# Patient Record
Sex: FEMALE | Race: OTHER | URBAN - METROPOLITAN AREA
[De-identification: names, ages, dates, MRNs, and addresses within clinical notes are randomized per-mention and may not be internally consistent; named-entity substitution may affect disease eponyms.]

---

## 2021-02-25 ENCOUNTER — OFFICE VISIT (OUTPATIENT)
Dept: FAMILY MEDICINE CLINIC | Age: 51
End: 2021-02-25
Payer: COMMERCIAL

## 2021-02-25 VITALS
DIASTOLIC BLOOD PRESSURE: 78 MMHG | BODY MASS INDEX: 25.19 KG/M2 | SYSTOLIC BLOOD PRESSURE: 122 MMHG | OXYGEN SATURATION: 98 % | HEART RATE: 73 BPM | TEMPERATURE: 97.7 F | HEIGHT: 58 IN | WEIGHT: 120 LBS

## 2021-02-25 DIAGNOSIS — Z12.31 ENCOUNTER FOR SCREENING MAMMOGRAM FOR MALIGNANT NEOPLASM OF BREAST: ICD-10-CM

## 2021-02-25 DIAGNOSIS — Z23 NEED FOR VACCINATION: ICD-10-CM

## 2021-02-25 DIAGNOSIS — I10 ESSENTIAL HYPERTENSION: ICD-10-CM

## 2021-02-25 DIAGNOSIS — Z12.11 SCREENING FOR COLON CANCER: ICD-10-CM

## 2021-02-25 DIAGNOSIS — Z00.00 ENCOUNTER FOR PREVENTIVE CARE: Primary | ICD-10-CM

## 2021-02-25 PROCEDURE — 99386 PREV VISIT NEW AGE 40-64: CPT | Performed by: NURSE PRACTITIONER

## 2021-02-25 RX ORDER — LISINOPRIL 10 MG/1
10 TABLET ORAL DAILY
Qty: 90 TABLET | Refills: 1 | Status: SHIPPED | OUTPATIENT
Start: 2021-02-25 | End: 2021-02-25

## 2021-02-25 RX ORDER — ZOSTER VACCINE RECOMBINANT, ADJUVANTED 50 MCG/0.5
0.5 KIT INTRAMUSCULAR SEE ADMIN INSTRUCTIONS
Qty: 0.5 ML | Refills: 0 | Status: SHIPPED | OUTPATIENT
Start: 2021-02-25 | End: 2021-08-24

## 2021-02-25 RX ORDER — LISINOPRIL 10 MG/1
10 TABLET ORAL DAILY
Qty: 30 TABLET | Refills: 0 | Status: SHIPPED | OUTPATIENT
Start: 2021-02-25 | End: 2021-09-20

## 2021-02-25 RX ORDER — CHLORAL HYDRATE 500 MG
3000 CAPSULE ORAL 3 TIMES DAILY
COMMUNITY

## 2021-02-25 RX ORDER — LISINOPRIL 10 MG/1
TABLET ORAL
COMMUNITY
Start: 2021-01-06 | End: 2021-02-25 | Stop reason: SDUPTHER

## 2021-02-25 ASSESSMENT — PATIENT HEALTH QUESTIONNAIRE - PHQ9
SUM OF ALL RESPONSES TO PHQ9 QUESTIONS 1 & 2: 0
SUM OF ALL RESPONSES TO PHQ QUESTIONS 1-9: 0
2. FEELING DOWN, DEPRESSED OR HOPELESS: 0

## 2021-02-25 NOTE — PROGRESS NOTES
Moraima Houston  : 1970  Encounter date: 2021    This trey 48 y.o. female who presents with  Chief Complaint   Patient presents with    New Patient     New pt appt        History of present illness:    HPI   History and Physical      Moraima Houston  YOB: 1970    Date of Service:  2021    Chief Complaint:   Moraima Houston is a 48 y.o. female who presents for complete physical examination    HPI: Pt is 48year old female for annual exam.  Due for labs. Existing hypertension, well controlled with lisinopril. No new concerns. Wt Readings from Last 3 Encounters:   21 120 lb (54.4 kg)     BP Readings from Last 3 Encounters:   21 122/78       Patient Active Problem List   Diagnosis    Essential hypertension       Allergies   Allergen Reactions    Crab Extract Allergy Skin Test     Seasonal     Shellfish Allergy     Shrimp Extract Allergy Skin Test      Outpatient Medications Marked as Taking for the 21 encounter (Office Visit) with DEMIAN Hinkle NP   Medication Sig Dispense Refill    Cholecalciferol 50 MCG (2000 UT) CAPS Take 2,000 Units by mouth daily      Omega-3 Fatty Acids (FISH OIL) 1000 MG CAPS Take 3,000 mg by mouth 3 times daily      zoster recombinant adjuvanted vaccine (SHINGRIX) 50 MCG/0.5ML SUSR injection Inject 0.5 mLs into the muscle See Admin Instructions 1 dose now and repeat in 2-6 months 0.5 mL 0    lisinopril (PRINIVIL;ZESTRIL) 10 MG tablet Take 1 tablet by mouth daily 30 tablet 0       History reviewed. No pertinent past medical history. History reviewed. No pertinent surgical history. History reviewed. No pertinent family history.   Social History     Socioeconomic History    Marital status:      Spouse name: Not on file    Number of children: Not on file    Years of education: Not on file    Highest education level: Not on file   Occupational History    Not on file   Social Needs  Financial resource strain: Not on file   Kirill-Jonathan insecurity     Worry: Not on file     Inability: Not on file   [employer] needs     Medical: Not on file     Non-medical: Not on file   Tobacco Use    Smoking status: Never Smoker    Smokeless tobacco: Never Used   Substance and Sexual Activity    Alcohol use: Yes     Comment: ocasionally    Drug use: Never    Sexual activity: Not on file   Lifestyle    Physical activity     Days per week: Not on file     Minutes per session: Not on file    Stress: Not on file   Relationships    Social connections     Talks on phone: Not on file     Gets together: Not on file     Attends Anabaptist service: Not on file     Active member of club or organization: Not on file     Attends meetings of clubs or organizations: Not on file     Relationship status: Not on file    Intimate partner violence     Fear of current or ex partner: Not on file     Emotionally abused: Not on file     Physically abused: Not on file     Forced sexual activity: Not on file   Other Topics Concern    Not on file   Social History Narrative    Not on file       Hx abnormal PAP: no  Sexual activity: single partner, contraception - none   Self-breast exams: yes  Last eye exam: 2020  Exercise: no regular exercise    Review of Systems:  A comprehensive review of systems was negative. Physical Exam:   Vitals:    02/25/21 1432   BP: 122/78   Site: Left Upper Arm   Position: Sitting   Cuff Size: Medium Adult   Pulse: 73   Temp: 97.7 °F (36.5 °C)   TempSrc: Temporal   SpO2: 98%   Weight: 120 lb (54.4 kg)   Height: 4' 10\" (1.473 m)     Body mass index is 25.08 kg/m². Constitutional: She is oriented to person, place, and time. She appears well-developed and well-nourished. No distress. HEENT:   Head: Normocephalic and atraumatic.    Right Ear: Tympanic membrane, external ear and ear canal normal.   Left Ear: Tympanic membrane, external ear and ear canal normal. Mouth/Throat: Oropharynx is clear and moist, and mucous membranes are normal.  There is no cervical adenopathy. Eyes: Conjunctivae and extraocular motions are normal. Pupils are equal, round, and reactive to light. Neck: Supple. No JVD present. Carotid bruit is not present. No mass and no thyromegaly present. Cardiovascular: Normal rate, regular rhythm, normal heart sounds and intact distal pulses. Exam reveals no gallop and no friction rub. No murmur heard. Pulmonary/Chest: Effort normal and breath sounds normal. No respiratory distress. She has no wheezes, rhonchi or rales. Abdominal: Soft, non-tender. Bowel sounds and aorta are normal. She exhibits no organomegaly, mass or bruit. Genitourinary: examination not indicated. Breast exam:  not examined. Musculoskeletal: Normal range of motion, no synovitis. She exhibits no edema. Neurological: She is alert and oriented to person, place, and time. She has normal reflexes. No cranial nerve deficit. Coordination normal.   Skin: Skin is warm and dry. There is no rash or erythema. No suspicious lesions noted. Psychiatric: She has a normal mood and affect.  Her speech is normal and behavior is normal. Judgment, cognition and memory are normal.     Preventive Care:  Health Maintenance   Topic Date Due    Potassium monitoring  1970    Creatinine monitoring  1970    Hepatitis C screen  1970    HIV screen  11/16/1985    Cervical cancer screen  11/16/1991    Lipid screen  11/16/2010    Diabetes screen  11/16/2010    Flu vaccine (1) 09/01/2020    Breast cancer screen  11/16/2020    Shingles Vaccine (1 of 2) 11/16/2020    Colon cancer screen colonoscopy  11/16/2020    DTaP/Tdap/Td vaccine (2 - Td) 12/21/2020    Hepatitis A vaccine  Aged Out    Hepatitis B vaccine  Aged Out    Hib vaccine  Aged Out    Meningococcal (ACWY) vaccine  Aged Out    Pneumococcal 0-64 years Vaccine  Aged [place] Preventive plan of care for Natasha Maloney        2/25/2021           Preventive Measures Status       Recommendations for screening   Colon Cancer Screen   Last colonoscopy:NA Test recommended and ordered   Breast Cancer Screen  Last mammogram: 2018 Test recommended and ordered   Cervical Cancer Screen   Last PAP smear: 2018 Repeat every 3 years   Osteoporosis Screen   Last DXA scan: NA Recommended, but declined   Diabetes Screen  No results found for: GLUCOSE Test recommended and ordered   Cholesterol Screen  No results found for: CHOL, TRIG, HDL, LDLCALC, LDLDIRECT Test recommended and ordered   Aspirin for Cardiovascular Prevention   No Not indicated   Weight: Body mass index is 25.08 kg/m². 4' 10\" (1.473 m)120 lb (54.4 kg)    Your BMI is between 18.5 and 24.9, which indicates that you are at a healthy weight   Living Will: No   recommended    Recommended Immunizations      There is no immunization history on file for this patient. Influenza vaccine:  recommended every fall         Other Recommendations ·   Follow up in this office every 6 months for re-evaluation of your chronic medical issues                 Assessment/Plan:    Christina Willis was seen today for new patient. Diagnoses and all orders for this visit:    Encounter for preventive care  -     CBC Auto Differential; Future  -     Comprehensive Metabolic Panel, Fasting; Future  -     Lipid, Fasting; Future  -     HIV Screen; Future  -     HEPATITIS C ANTIBODY; Future    Essential hypertension  -     Discontinue: lisinopril (PRINIVIL;ZESTRIL) 10 MG tablet; Take 1 tablet by mouth daily  -     Microalbumin / Creatinine Urine Ratio; Future  -     lisinopril (PRINIVIL;ZESTRIL) 10 MG tablet; Take 1 tablet by mouth daily    Encounter for screening mammogram for malignant neoplasm of breast  -     AWA DIGITAL SCREEN W OR WO CAD BILATERAL;  Future    Need for vaccination -     zoster recombinant adjuvanted vaccine [facility]) 50 MCG/0.5ML SUSR injection; Inject 0.5 mLs into the muscle See Admin Instructions 1 dose now and repeat in 2-6 months    Screening for colon cancer  -     Iraida (For External Results Only); Future                Current Outpatient Medications on File Prior to Visit   Medication Sig Dispense Refill    Cholecalciferol 50 MCG (2000 UT) CAPS Take 2,000 Units by mouth daily      Omega-3 Fatty Acids (FISH OIL) 1000 MG CAPS Take 3,000 mg by mouth 3 times daily       No current facility-administered medications on file prior to visit. Allergies   Allergen Reactions    Crab Extract Allergy Skin Test     Seasonal     Shellfish Allergy     Shrimp Extract Allergy Skin Test      History reviewed. No pertinent past medical history. History reviewed. No pertinent surgical history. History reviewed. No pertinent family history. Social History     Tobacco Use    Smoking status: Never Smoker    Smokeless tobacco: Never Used   Substance Use Topics    Alcohol use: Yes     Comment: ocasionally        Review of Systems    Objective:    /78 (Site: Left Upper Arm, Position: Sitting, Cuff Size: Medium Adult)   Pulse 73   Temp 97.7 °F (36.5 °C) (Temporal)   Ht 4' 10\" (1.473 m)   Wt 120 lb (54.4 kg)   SpO2 98%   BMI 25.08 kg/m²   Weight: 120 lb (54.4 kg)     BP Readings from Last 3 Encounters:   02/25/21 122/78     Wt Readings from Last 3 Encounters:   02/25/21 120 lb (54.4 kg)     BMI Readings from Last 3 Encounters:   02/25/21 25.08 kg/m²       Physical Exam    Assessment/Plan    1. Encounter for preventive care  Advised living will  Advised routine dental and vision  Advised increased exercise  - CBC Auto Differential; Future  - Comprehensive Metabolic Panel, Fasting; Future  - Lipid, Fasting; Future  - HIV Screen; Future  - HEPATITIS C ANTIBODY; Future    2.  Essential hypertension  Advised routine monitoring - Microalbumin / Creatinine Urine Ratio; Future  - lisinopril (PRINIVIL;ZESTRIL) 10 MG tablet; Take 1 tablet by mouth daily  Dispense: 30 tablet; Refill: 0    3. Encounter for screening mammogram for malignant neoplasm of breast  - AWA DIGITAL SCREEN W OR WO CAD BILATERAL; Future    4. Need for vaccination  Advised COVID, influenza vaccination  - zoster recombinant adjuvanted vaccine [facility]) 50 MCG/0.5ML SUSR injection; Inject 0.5 mLs into the muscle See Admin Instructions 1 dose now and repeat in 2-6 months  Dispense: 0.5 mL; Refill: 0    5. Screening for colon cancer  - Cologuard (For External Results Only); Future      Return in about 6 months (around 8/25/2021) for hypertension re-check. This dictation was generated by voice recognition computer software. Although all attempts are made to edit the dictation for accuracy, there may be errors in the transcription that are not intended.

## 2021-03-24 ENCOUNTER — TELEPHONE (OUTPATIENT)
Dept: FAMILY MEDICINE CLINIC | Age: 51
End: 2021-03-24

## 2021-04-05 ENCOUNTER — TELEPHONE (OUTPATIENT)
Dept: FAMILY MEDICINE CLINIC | Age: 51
End: 2021-04-05

## 2021-09-20 DIAGNOSIS — I10 ESSENTIAL HYPERTENSION: ICD-10-CM

## 2021-09-20 RX ORDER — LISINOPRIL 10 MG/1
TABLET ORAL
Qty: 30 TABLET | Refills: 0 | Status: SHIPPED | OUTPATIENT
Start: 2021-09-20 | End: 2021-11-02

## 2021-10-27 ENCOUNTER — TELEPHONE (OUTPATIENT)
Dept: FAMILY MEDICINE CLINIC | Age: 51
End: 2021-10-27

## 2021-10-27 NOTE — TELEPHONE ENCOUNTER
----- Message from Rahul Oropeza sent at 10/27/2021  1:55 PM EDT -----  Subject: Refill Request    QUESTIONS  Name of Medication? lisinopril (PRINIVIL;ZESTRIL) 10 MG tablet  Patient-reported dosage and instructions? 10 MG tablet - One per day  How many days do you have left? 0  Preferred Pharmacy? [address] [place] [address] [email or web address]  Pharmacy phone number (if available)? [phone]  ---------------------------------------------------------------------------  --------------  Namrata MEDINA  What is the best way for the office to contact you? OK to leave message on   voicemail  Preferred Call Back Phone Number?  [phone]

## 2021-10-31 DIAGNOSIS — I10 ESSENTIAL HYPERTENSION: ICD-10-CM

## 2021-11-01 DIAGNOSIS — I10 ESSENTIAL HYPERTENSION: ICD-10-CM

## 2021-11-01 RX ORDER — LISINOPRIL 10 MG/1
TABLET ORAL
Qty: 30 TABLET | Refills: 0 | OUTPATIENT
Start: 2021-11-01

## 2021-11-01 NOTE — TELEPHONE ENCOUNTER
Medication:   Requested Prescriptions     Pending Prescriptions Disp Refills    lisinopril (PRINIVIL;ZESTRIL) 10 MG tablet 30 tablet 0     Sig: TAKE ONE TABLET BY MOUTH DAILY *Patient due for an appt*        Last Filled: 9/20/2021      Patient Phone Number: [phone] (home)     Last appt: 2/25/2021   Next appt: Visit date not found    Last OARRS: No flowsheet data found.

## 2021-11-01 NOTE — TELEPHONE ENCOUNTER
lisinopril (PRINIVIL;ZESTRIL) 10 MG tablet [[id number]]    [facility], [address] [place] [address] [address]   [address] [place] [address], [address] [place] [address],[address] [id number]   Phone:  [phone]  Fax:  [phone]

## 2021-11-02 RX ORDER — LISINOPRIL 10 MG/1
10 TABLET ORAL DAILY
Qty: 30 TABLET | Refills: 0 | Status: SHIPPED | OUTPATIENT
Start: 2021-11-02 | End: 2021-11-08 | Stop reason: SDUPTHER

## 2021-11-02 NOTE — TELEPHONE ENCOUNTER
Medication:   Requested Prescriptions     Pending Prescriptions Disp Refills    lisinopril (PRINIVIL;ZESTRIL) 10 MG tablet [Pharmacy Med Name: LISINOPRIL 10 MG TABLET] 30 tablet 0     Sig: Take 1 tablet by mouth daily *Patient needs an appointment for further refill*        Last Filled: 9/20/2021     Patient Phone Number: [phone] (home)     Last appt: 2/25/2021   Next appt:     Last OARRS: No flowsheet data found.

## 2021-11-08 ENCOUNTER — OFFICE VISIT (OUTPATIENT)
Dept: FAMILY MEDICINE CLINIC | Age: 51
End: 2021-11-08
Payer: COMMERCIAL

## 2021-11-08 VITALS
HEART RATE: 86 BPM | DIASTOLIC BLOOD PRESSURE: 72 MMHG | SYSTOLIC BLOOD PRESSURE: 112 MMHG | BODY MASS INDEX: 26.54 KG/M2 | WEIGHT: 127 LBS | OXYGEN SATURATION: 97 % | TEMPERATURE: 98.4 F

## 2021-11-08 DIAGNOSIS — I10 ESSENTIAL HYPERTENSION: Primary | ICD-10-CM

## 2021-11-08 DIAGNOSIS — Z00.00 ENCOUNTER FOR PREVENTIVE CARE: ICD-10-CM

## 2021-11-08 DIAGNOSIS — I10 ESSENTIAL HYPERTENSION: ICD-10-CM

## 2021-11-08 PROCEDURE — 99213 OFFICE O/P EST LOW 20 MIN: CPT | Performed by: NURSE PRACTITIONER

## 2021-11-08 RX ORDER — LISINOPRIL 10 MG/1
10 TABLET ORAL DAILY
Qty: 90 TABLET | Refills: 1 | Status: SHIPPED | OUTPATIENT
Start: 2021-11-08

## 2023-03-20 LAB — MAMMOGRAPHY, EXTERNAL: NORMAL

## 2024-05-29 ENCOUNTER — OFFICE VISIT (OUTPATIENT)
Dept: PRIMARY CARE CLINIC | Age: 54
End: 2024-05-29
Payer: COMMERCIAL

## 2024-05-29 VITALS
HEIGHT: 58 IN | WEIGHT: 102.4 LBS | DIASTOLIC BLOOD PRESSURE: 72 MMHG | TEMPERATURE: 98.2 F | SYSTOLIC BLOOD PRESSURE: 118 MMHG | BODY MASS INDEX: 21.5 KG/M2 | HEART RATE: 65 BPM | OXYGEN SATURATION: 97 %

## 2024-05-29 DIAGNOSIS — N30.00 ACUTE CYSTITIS WITHOUT HEMATURIA: Primary | ICD-10-CM

## 2024-05-29 DIAGNOSIS — K80.50 CALCULUS OF BILE DUCT WITHOUT CHOLECYSTITIS AND WITHOUT OBSTRUCTION: ICD-10-CM

## 2024-05-29 PROBLEM — K80.20 CALCULUS OF GALLBLADDER WITHOUT CHOLECYSTITIS WITHOUT OBSTRUCTION: Status: ACTIVE | Noted: 2023-10-02

## 2024-05-29 PROBLEM — R76.12 POSITIVE QUANTIFERON-TB GOLD TEST: Status: ACTIVE | Noted: 2023-09-13

## 2024-05-29 PROBLEM — K57.30 DIVERTICULOSIS OF COLON: Status: ACTIVE | Noted: 2023-05-05

## 2024-05-29 PROBLEM — R73.03 PREDIABETES: Chronic | Status: ACTIVE | Noted: 2023-04-28

## 2024-05-29 PROBLEM — K57.30 DIVERTICULOSIS OF COLON: Status: RESOLVED | Noted: 2023-05-05 | Resolved: 2024-05-29

## 2024-05-29 PROBLEM — D25.9 UTERINE LEIOMYOMA: Status: RESOLVED | Noted: 2022-04-27 | Resolved: 2024-05-29

## 2024-05-29 PROBLEM — I10 ESSENTIAL HYPERTENSION: Status: RESOLVED | Noted: 2021-02-25 | Resolved: 2024-05-29

## 2024-05-29 PROBLEM — E78.2 MIXED HYPERLIPIDEMIA: Chronic | Status: ACTIVE | Noted: 2019-07-12

## 2024-05-29 PROBLEM — Z86.19 HISTORY OF HEPATITIS B: Status: ACTIVE | Noted: 2023-09-13

## 2024-05-29 PROBLEM — R63.4 WEIGHT LOSS: Status: RESOLVED | Noted: 2024-01-24 | Resolved: 2024-05-29

## 2024-05-29 PROBLEM — R76.8 POSITIVE ANA (ANTINUCLEAR ANTIBODY): Status: ACTIVE | Noted: 2024-05-29

## 2024-05-29 PROBLEM — R63.4 WEIGHT LOSS: Status: ACTIVE | Noted: 2024-01-24

## 2024-05-29 PROBLEM — D25.9 UTERINE LEIOMYOMA: Status: ACTIVE | Noted: 2022-04-27

## 2024-05-29 PROBLEM — R76.12 POSITIVE QUANTIFERON-TB GOLD TEST: Status: RESOLVED | Noted: 2023-09-13 | Resolved: 2024-05-29

## 2024-05-29 PROCEDURE — 99203 OFFICE O/P NEW LOW 30 MIN: CPT | Performed by: FAMILY MEDICINE

## 2024-05-29 RX ORDER — CHLORAL HYDRATE 500 MG
1 CAPSULE ORAL DAILY
COMMUNITY
End: 2024-05-29

## 2024-05-29 RX ORDER — VITAMIN B COMPLEX
1000 TABLET ORAL DAILY
COMMUNITY

## 2024-05-29 RX ORDER — CIPROFLOXACIN 500 MG/1
500 TABLET, FILM COATED ORAL 2 TIMES DAILY
Qty: 14 TABLET | Refills: 0 | Status: SHIPPED | OUTPATIENT
Start: 2024-05-29 | End: 2024-06-05

## 2024-05-29 RX ORDER — ACETAMINOPHEN 500 MG
500 TABLET ORAL EVERY 6 HOURS PRN
COMMUNITY

## 2024-05-29 RX ORDER — IBUPROFEN 200 MG
200 TABLET ORAL EVERY 6 HOURS PRN
COMMUNITY
End: 2024-05-29

## 2024-05-29 SDOH — ECONOMIC STABILITY: HOUSING INSECURITY
IN THE LAST 12 MONTHS, WAS THERE A TIME WHEN YOU DID NOT HAVE A STEADY PLACE TO SLEEP OR SLEPT IN A SHELTER (INCLUDING NOW)?: NO

## 2024-05-29 SDOH — ECONOMIC STABILITY: INCOME INSECURITY: HOW HARD IS IT FOR YOU TO PAY FOR THE VERY BASICS LIKE FOOD, HOUSING, MEDICAL CARE, AND HEATING?: NOT HARD AT ALL

## 2024-05-29 SDOH — ECONOMIC STABILITY: FOOD INSECURITY: WITHIN THE PAST 12 MONTHS, YOU WORRIED THAT YOUR FOOD WOULD RUN OUT BEFORE YOU GOT MONEY TO BUY MORE.: NEVER TRUE

## 2024-05-29 SDOH — ECONOMIC STABILITY: FOOD INSECURITY: WITHIN THE PAST 12 MONTHS, THE FOOD YOU BOUGHT JUST DIDN'T LAST AND YOU DIDN'T HAVE MONEY TO GET MORE.: NEVER TRUE

## 2024-05-29 SDOH — HEALTH STABILITY: PHYSICAL HEALTH: ON AVERAGE, HOW MANY MINUTES DO YOU ENGAGE IN EXERCISE AT THIS LEVEL?: 0 MIN

## 2024-05-29 SDOH — HEALTH STABILITY: PHYSICAL HEALTH: ON AVERAGE, HOW MANY DAYS PER WEEK DO YOU ENGAGE IN MODERATE TO STRENUOUS EXERCISE (LIKE A BRISK WALK)?: 0 DAYS

## 2024-05-29 ASSESSMENT — ANXIETY QUESTIONNAIRES
2. NOT BEING ABLE TO STOP OR CONTROL WORRYING: NOT AT ALL
5. BEING SO RESTLESS THAT IT IS HARD TO SIT STILL: NOT AT ALL
6. BECOMING EASILY ANNOYED OR IRRITABLE: NOT AT ALL
3. WORRYING TOO MUCH ABOUT DIFFERENT THINGS: NOT AT ALL
7. FEELING AFRAID AS IF SOMETHING AWFUL MIGHT HAPPEN: NOT AT ALL
4. TROUBLE RELAXING: MORE THAN HALF THE DAYS

## 2024-05-29 ASSESSMENT — PATIENT HEALTH QUESTIONNAIRE - PHQ9
1. LITTLE INTEREST OR PLEASURE IN DOING THINGS: NOT AT ALL
SUM OF ALL RESPONSES TO PHQ QUESTIONS 1-9: 0
SUM OF ALL RESPONSES TO PHQ9 QUESTIONS 1 & 2: 0
SUM OF ALL RESPONSES TO PHQ QUESTIONS 1-9: 0

## 2024-05-29 NOTE — PROGRESS NOTES
COLLAGEN PO Take 1 tablet by mouth Daily      Ibuprofen-diphenhydrAMINE Cit (ADVIL PM PO) Take 1 tablet by mouth nightly as needed (insomnia)      ciprofloxacin (CIPRO) 500 MG tablet Take 1 tablet by mouth 2 times daily for 7 days 14 tablet 0     No current facility-administered medications for this visit.      Allergies   Allergen Reactions    Crab Extract Itching    Shellfish Allergy Itching    Shrimp Extract Itching       Objective:   /72 (Site: Right Upper Arm, Position: Sitting, Cuff Size: Medium Adult)   Pulse 65   Temp 98.2 °F (36.8 °C) (Oral)   Ht 1.468 m (4' 9.8\")   Wt 46.4 kg (102 lb 6.4 oz)   SpO2 97%   BMI 21.55 kg/m²   Alert oriented, NAD, ambulatory  HEENT: unremarkable  Chest/Lungs: clear to ausculation, no wheezing/rales  Heart: RR, normal S1S2, no murmur  Abdomen: soft, good bowel sounds, right upper quadrant tenderness, Calderon sign positive, no mass palpated.  Positive lower abdominal tenderness as well.  Extremities: good ROM, good pulses  Neurologic: grossly normal, DTR 2+ bilateral      Assessment/Plan:   1. Acute cystitis without hematuria  Patient took Azo diet so the POC urinalysis cannot be relied on due to colorimetric interference.  Assume she has UTI will send urine for culture.  Start Cipro 5 mg twice a day for 7 days.  Call if symptoms worsens.  - ciprofloxacin (CIPRO) 500 MG tablet; Take 1 tablet by mouth 2 times daily for 7 days  Dispense: 14 tablet; Refill: 0  - Culture, Urine    2. Calculus of bile duct without cholecystitis and without obstruction  Based on September 23 ultrasound shows cholelithiasis but no cholecystitis or obstruction.  Patient tender in the right upper quadrant and sometimes stool color is light or lisette colored.  Will send to surgeon for evaluation if cholecystectomy is indicated  - Iam Malik MD, General Surgery, [facility]      Return in about 2 weeks (around 6/12/2024) for f/u lower abdominal.     Electronically Signed:

## 2024-05-30 LAB — BACTERIA UR CULT: NORMAL

## 2024-06-11 ENCOUNTER — PREP FOR PROCEDURE (OUTPATIENT)
Dept: SURGERY | Age: 54
End: 2024-06-11

## 2024-06-11 ENCOUNTER — OFFICE VISIT (OUTPATIENT)
Dept: SURGERY | Age: 54
End: 2024-06-11
Payer: COMMERCIAL

## 2024-06-11 VITALS — WEIGHT: 102.8 LBS | BODY MASS INDEX: 21.63 KG/M2 | SYSTOLIC BLOOD PRESSURE: 134 MMHG | DIASTOLIC BLOOD PRESSURE: 78 MMHG

## 2024-06-11 DIAGNOSIS — K42.9 UMBILICAL HERNIA WITHOUT OBSTRUCTION AND WITHOUT GANGRENE: ICD-10-CM

## 2024-06-11 DIAGNOSIS — K80.20 SYMPTOMATIC CHOLELITHIASIS: Primary | ICD-10-CM

## 2024-06-11 DIAGNOSIS — K80.20 SYMPTOMATIC CHOLELITHIASIS: ICD-10-CM

## 2024-06-11 PROCEDURE — 99213 OFFICE O/P EST LOW 20 MIN: CPT | Performed by: SURGERY

## 2024-06-11 RX ORDER — SODIUM CHLORIDE 0.9 % (FLUSH) 0.9 %
5-40 SYRINGE (ML) INJECTION PRN
OUTPATIENT
Start: 2024-06-11

## 2024-06-11 RX ORDER — SODIUM CHLORIDE 9 MG/ML
INJECTION, SOLUTION INTRAVENOUS PRN
OUTPATIENT
Start: 2024-06-11

## 2024-06-11 RX ORDER — SODIUM CHLORIDE 0.9 % (FLUSH) 0.9 %
5-40 SYRINGE (ML) INJECTION EVERY 12 HOURS SCHEDULED
OUTPATIENT
Start: 2024-06-11

## 2024-06-11 ASSESSMENT — ENCOUNTER SYMPTOMS
ABDOMINAL PAIN: 1
ALLERGIC/IMMUNOLOGIC NEGATIVE: 1
EYES NEGATIVE: 1
RESPIRATORY NEGATIVE: 1

## 2024-06-11 NOTE — PROGRESS NOTES
[facility]      PATIENT NAME: Stephani Reyes      TODAY'S DATE: 6/11/2024    Reason for Consult:  Abd pain    Requesting Physician:  Dr. E. Reyes    HISTORY OF PRESENT ILLNESS:              The patient is a 53 y.o. female who presents with abd pain, upper and right, focal, moderate, more with pressure. The pt has had symptoms for months. She has had associated symptoms of bloating and nausea.    Testing has included US. This showed findings of gallstones.      Past Medical History:        Diagnosis Date    Chronic chest pain 01/24/2024    Due to chronic pleural disease.  Was referred to pain clinic.    Diverticulosis of colon 05/05/2023    Essential hypertension 02/25/2021    Hyperlipidemia     Positive QuantiFERON-TB Gold test 09/13/2023    Uterine leiomyoma 04/27/2022       Past Surgical History:        Procedure Laterality Date    COLONOSCOPY  05/05/2023    With polypectomy performed by Dr. Ren Yee    LUNG SURGERY  08/21/2023    s/p Robotic evacuation of pleural effusion, pleural biopsy x2 ,partial pleurectomy , mechanical pleurodesis,talc pleurodesis       Current Medications:   No current facility-administered medications for this visit.  Prior to Admission medications    Medication Sig Start Date End Date Taking? Authorizing Provider   acetaminophen (TYLENOL) 500 MG tablet Take 1 tablet by mouth every 6 hours as needed for Pain   Yes Oziel Thomson MD   Vitamin D (CHOLECALCIFEROL) 25 MCG (1000 UT) TABS tablet Take 1 tablet by mouth daily   Yes Oziel Thomson MD   COLLAGEN PO Take 1 tablet by mouth Daily   Yes Oziel Thomson MD   Ibuprofen-diphenhydrAMINE Cit (ADVIL PM PO) Take 1 tablet by mouth nightly as needed (insomnia)   Yes Oziel Thomson MD        Allergies:  Crab extract, Shellfish allergy, and Shrimp extract    Social History:    reports that she has never smoked. She has never been exposed to tobacco smoke. She has never used

## 2024-06-12 NOTE — PROGRESS NOTES
Name_______________________________________Printed:____________________  Date and time of surgery____6/17/2024_______1300_____________Arrival Time:_1130____/per office____   1. The instructions given regarding when and if a patient needs to stop oral intake prior to surgery varies.Follow the specific instructions you were given                  _x__Nothing to eat or to drink after Midnight the night before.                   ____Carbo loading or instructions will be given to select patients-if you have been given those instructions -please do the following                           The evening before your surgery after dinner before midnight drink 40 ounces of gatorade.If you are diabetic use sugar free.  The morning of surgery drink 40 ounces of water.This needs to be finished 3 hours prior to your surgery start time.    2. Take the following pills with a small sip of water on the morning of surgery___________________________________________________                  Do not take blood pressure medications ending in pril or sartan the champ prior to surgery or the morning of surgery. Dr Hidalgo's patient are not to take any medications the AM of surgery.         3. Aspirin, Ibuprofen, Advil, Naproxen, Vitamin E and other Anti-inflammatory products and supplements should be stopped for 5 -7days before surgery or as directed by your physician.   4. Check with your Doctor regarding stopping Plavix, Coumadin,Eliquis, Lovenox,Effient,Pradaxa,Xarelto, Fragmin or other blood thinners and follow their instructions.   5. Do not smoke, and do not drink any alcoholic beverages 24 hours prior to surgery.  This includes NA Beer.Refrain from the usage of any recreational drugs.   6. You may brush your teeth and gargle the morning of surgery.  DO NOT SWALLOW WATER   7. You MUST make arrangements for a responsible adult to stay on site while you are here and take you home after your surgery. You will not be allowed to leave alone or

## 2024-06-17 ENCOUNTER — ANESTHESIA (OUTPATIENT)
Dept: OPERATING ROOM | Age: 54
End: 2024-06-17
Payer: COMMERCIAL

## 2024-06-17 ENCOUNTER — HOSPITAL ENCOUNTER (OUTPATIENT)
Age: 54
Setting detail: OUTPATIENT SURGERY
Discharge: HOME | End: 2024-06-17
Attending: SURGERY | Admitting: SURGERY
Payer: COMMERCIAL

## 2024-06-17 ENCOUNTER — ANESTHESIA EVENT (OUTPATIENT)
Dept: OPERATING ROOM | Age: 54
End: 2024-06-17
Payer: COMMERCIAL

## 2024-06-17 ENCOUNTER — APPOINTMENT (OUTPATIENT)
Dept: GENERAL RADIOLOGY | Age: 54
End: 2024-06-17
Attending: SURGERY
Payer: COMMERCIAL

## 2024-06-17 VITALS
SYSTOLIC BLOOD PRESSURE: 143 MMHG | RESPIRATION RATE: 20 BRPM | TEMPERATURE: 97.6 F | OXYGEN SATURATION: 94 % | WEIGHT: 100 LBS | BODY MASS INDEX: 21.57 KG/M2 | HEART RATE: 83 BPM | HEIGHT: 57 IN | DIASTOLIC BLOOD PRESSURE: 90 MMHG

## 2024-06-17 DIAGNOSIS — K42.9 UMBILICAL HERNIA: ICD-10-CM

## 2024-06-17 DIAGNOSIS — K80.20 SYMPTOMATIC CHOLELITHIASIS: ICD-10-CM

## 2024-06-17 PROBLEM — R19.00 INTRAABDOMINAL MASS: Status: ACTIVE | Noted: 2024-06-17

## 2024-06-17 LAB
CANCER AG125 SERPL-ACNC: 1680 U/ML (ref 0–35)
CEA SERPL-MCNC: 8.8 NG/ML (ref 0–5)
GLUCOSE BLD-MCNC: 85 MG/DL (ref 70–99)

## 2024-06-17 PROCEDURE — 2500000003 HC RX 250 WO HCPCS: Performed by: NURSE ANESTHETIST, CERTIFIED REGISTERED

## 2024-06-17 PROCEDURE — 2580000003 HC RX 258: Performed by: SURGERY

## 2024-06-17 PROCEDURE — 7100000011 HC PHASE II RECOVERY - ADDTL 15 MIN: Performed by: SURGERY

## 2024-06-17 PROCEDURE — 88307 TISSUE EXAM BY PATHOLOGIST: CPT

## 2024-06-17 PROCEDURE — 86304 IMMUNOASSAY TUMOR CA 125: CPT

## 2024-06-17 PROCEDURE — 6360000002 HC RX W HCPCS: Performed by: SURGERY

## 2024-06-17 PROCEDURE — 7100000010 HC PHASE II RECOVERY - FIRST 15 MIN: Performed by: SURGERY

## 2024-06-17 PROCEDURE — 3700000001 HC ADD 15 MINUTES (ANESTHESIA): Performed by: SURGERY

## 2024-06-17 PROCEDURE — 3600000014 HC SURGERY LEVEL 4 ADDTL 15MIN: Performed by: SURGERY

## 2024-06-17 PROCEDURE — 6360000002 HC RX W HCPCS

## 2024-06-17 PROCEDURE — 36415 COLL VENOUS BLD VENIPUNCTURE: CPT

## 2024-06-17 PROCEDURE — 86301 IMMUNOASSAY TUMOR CA 19-9: CPT

## 2024-06-17 PROCEDURE — 2720000010 HC SURG SUPPLY STERILE: Performed by: SURGERY

## 2024-06-17 PROCEDURE — 6370000000 HC RX 637 (ALT 250 FOR IP): Performed by: STUDENT IN AN ORGANIZED HEALTH CARE EDUCATION/TRAINING PROGRAM

## 2024-06-17 PROCEDURE — 88341 IMHCHEM/IMCYTCHM EA ADD ANTB: CPT

## 2024-06-17 PROCEDURE — 2709999900 HC NON-CHARGEABLE SUPPLY: Performed by: SURGERY

## 2024-06-17 PROCEDURE — 82378 CARCINOEMBRYONIC ANTIGEN: CPT

## 2024-06-17 PROCEDURE — 3600000004 HC SURGERY LEVEL 4 BASE: Performed by: SURGERY

## 2024-06-17 PROCEDURE — 88342 IMHCHEM/IMCYTCHM 1ST ANTB: CPT

## 2024-06-17 PROCEDURE — 74300 X-RAY BILE DUCTS/PANCREAS: CPT

## 2024-06-17 PROCEDURE — 6360000004 HC RX CONTRAST MEDICATION: Performed by: SURGERY

## 2024-06-17 PROCEDURE — 6360000002 HC RX W HCPCS: Performed by: NURSE ANESTHETIST, CERTIFIED REGISTERED

## 2024-06-17 PROCEDURE — 6360000002 HC RX W HCPCS: Performed by: ANESTHESIOLOGY

## 2024-06-17 PROCEDURE — 88305 TISSUE EXAM BY PATHOLOGIST: CPT

## 2024-06-17 PROCEDURE — 7100000000 HC PACU RECOVERY - FIRST 15 MIN: Performed by: SURGERY

## 2024-06-17 PROCEDURE — 87205 SMEAR GRAM STAIN: CPT

## 2024-06-17 PROCEDURE — 88112 CYTOPATH CELL ENHANCE TECH: CPT

## 2024-06-17 PROCEDURE — 3700000000 HC ANESTHESIA ATTENDED CARE: Performed by: SURGERY

## 2024-06-17 PROCEDURE — 87070 CULTURE OTHR SPECIMN AEROBIC: CPT

## 2024-06-17 PROCEDURE — 7100000001 HC PACU RECOVERY - ADDTL 15 MIN: Performed by: SURGERY

## 2024-06-17 PROCEDURE — 88304 TISSUE EXAM BY PATHOLOGIST: CPT

## 2024-06-17 PROCEDURE — 87075 CULTR BACTERIA EXCEPT BLOOD: CPT

## 2024-06-17 RX ORDER — HYDROMORPHONE HYDROCHLORIDE 2 MG/ML
0.25 INJECTION, SOLUTION INTRAMUSCULAR; INTRAVENOUS; SUBCUTANEOUS EVERY 5 MIN PRN
Status: DISCONTINUED | OUTPATIENT
Start: 2024-06-17 | End: 2024-06-17 | Stop reason: HOSPADM

## 2024-06-17 RX ORDER — NALOXONE HYDROCHLORIDE 0.4 MG/ML
INJECTION, SOLUTION INTRAMUSCULAR; INTRAVENOUS; SUBCUTANEOUS PRN
Status: DISCONTINUED | OUTPATIENT
Start: 2024-06-17 | End: 2024-06-17 | Stop reason: HOSPADM

## 2024-06-17 RX ORDER — LIDOCAINE HYDROCHLORIDE 20 MG/ML
INJECTION, SOLUTION EPIDURAL; INFILTRATION; INTRACAUDAL; PERINEURAL PRN
Status: DISCONTINUED | OUTPATIENT
Start: 2024-06-17 | End: 2024-06-17 | Stop reason: SDUPTHER

## 2024-06-17 RX ORDER — HYDROMORPHONE HYDROCHLORIDE 2 MG/ML
0.5 INJECTION, SOLUTION INTRAMUSCULAR; INTRAVENOUS; SUBCUTANEOUS EVERY 5 MIN PRN
Status: DISCONTINUED | OUTPATIENT
Start: 2024-06-17 | End: 2024-06-17 | Stop reason: HOSPADM

## 2024-06-17 RX ORDER — SODIUM CHLORIDE 0.9 % (FLUSH) 0.9 %
5-40 SYRINGE (ML) INJECTION EVERY 12 HOURS SCHEDULED
Status: DISCONTINUED | OUTPATIENT
Start: 2024-06-17 | End: 2024-06-17 | Stop reason: HOSPADM

## 2024-06-17 RX ORDER — LABETALOL HYDROCHLORIDE 5 MG/ML
5 INJECTION, SOLUTION INTRAVENOUS
Status: DISCONTINUED | OUTPATIENT
Start: 2024-06-17 | End: 2024-06-17 | Stop reason: HOSPADM

## 2024-06-17 RX ORDER — ROCURONIUM BROMIDE 10 MG/ML
INJECTION, SOLUTION INTRAVENOUS PRN
Status: DISCONTINUED | OUTPATIENT
Start: 2024-06-17 | End: 2024-06-17 | Stop reason: SDUPTHER

## 2024-06-17 RX ORDER — ONDANSETRON 2 MG/ML
4 INJECTION INTRAMUSCULAR; INTRAVENOUS
Status: DISCONTINUED | OUTPATIENT
Start: 2024-06-17 | End: 2024-06-17 | Stop reason: HOSPADM

## 2024-06-17 RX ORDER — BUPIVACAINE HYDROCHLORIDE 5 MG/ML
INJECTION, SOLUTION EPIDURAL; INTRACAUDAL
Status: COMPLETED | OUTPATIENT
Start: 2024-06-17 | End: 2024-06-17

## 2024-06-17 RX ORDER — HYDROMORPHONE HYDROCHLORIDE 2 MG/ML
INJECTION, SOLUTION INTRAMUSCULAR; INTRAVENOUS; SUBCUTANEOUS
Status: COMPLETED
Start: 2024-06-17 | End: 2024-06-17

## 2024-06-17 RX ORDER — FENTANYL CITRATE 50 UG/ML
INJECTION, SOLUTION INTRAMUSCULAR; INTRAVENOUS PRN
Status: DISCONTINUED | OUTPATIENT
Start: 2024-06-17 | End: 2024-06-17 | Stop reason: SDUPTHER

## 2024-06-17 RX ORDER — SODIUM CHLORIDE 9 MG/ML
INJECTION, SOLUTION INTRAVENOUS CONTINUOUS
Status: DISCONTINUED | OUTPATIENT
Start: 2024-06-17 | End: 2024-06-17 | Stop reason: HOSPADM

## 2024-06-17 RX ORDER — HYDROCODONE BITARTRATE AND ACETAMINOPHEN 5; 325 MG/1; MG/1
1 TABLET ORAL EVERY 4 HOURS PRN
Qty: 20 TABLET | Refills: 0 | Status: SHIPPED | OUTPATIENT
Start: 2024-06-17 | End: 2024-06-24

## 2024-06-17 RX ORDER — SUCCINYLCHOLINE/SOD CL,ISO/PF 100 MG/5ML
SYRINGE (ML) INTRAVENOUS PRN
Status: DISCONTINUED | OUTPATIENT
Start: 2024-06-17 | End: 2024-06-17 | Stop reason: SDUPTHER

## 2024-06-17 RX ORDER — ACETAMINOPHEN 500 MG
1000 TABLET ORAL ONCE
Status: COMPLETED | OUTPATIENT
Start: 2024-06-17 | End: 2024-06-17

## 2024-06-17 RX ORDER — SODIUM CHLORIDE 9 MG/ML
INJECTION, SOLUTION INTRAVENOUS PRN
Status: DISCONTINUED | OUTPATIENT
Start: 2024-06-17 | End: 2024-06-17 | Stop reason: HOSPADM

## 2024-06-17 RX ORDER — SODIUM CHLORIDE, SODIUM LACTATE, POTASSIUM CHLORIDE, CALCIUM CHLORIDE 600; 310; 30; 20 MG/100ML; MG/100ML; MG/100ML; MG/100ML
INJECTION, SOLUTION INTRAVENOUS CONTINUOUS PRN
Status: COMPLETED | OUTPATIENT
Start: 2024-06-17 | End: 2024-06-17

## 2024-06-17 RX ORDER — ONDANSETRON 2 MG/ML
INJECTION INTRAMUSCULAR; INTRAVENOUS PRN
Status: DISCONTINUED | OUTPATIENT
Start: 2024-06-17 | End: 2024-06-17 | Stop reason: SDUPTHER

## 2024-06-17 RX ORDER — SODIUM CHLORIDE 0.9 % (FLUSH) 0.9 %
5-40 SYRINGE (ML) INJECTION PRN
Status: DISCONTINUED | OUTPATIENT
Start: 2024-06-17 | End: 2024-06-17 | Stop reason: HOSPADM

## 2024-06-17 RX ORDER — PROPOFOL 10 MG/ML
INJECTION, EMULSION INTRAVENOUS PRN
Status: DISCONTINUED | OUTPATIENT
Start: 2024-06-17 | End: 2024-06-17 | Stop reason: SDUPTHER

## 2024-06-17 RX ORDER — DEXAMETHASONE SODIUM PHOSPHATE 4 MG/ML
INJECTION, SOLUTION INTRA-ARTICULAR; INTRALESIONAL; INTRAMUSCULAR; INTRAVENOUS; SOFT TISSUE PRN
Status: DISCONTINUED | OUTPATIENT
Start: 2024-06-17 | End: 2024-06-17 | Stop reason: SDUPTHER

## 2024-06-17 RX ORDER — OXYCODONE HYDROCHLORIDE 5 MG/1
5 TABLET ORAL ONCE AS NEEDED
Status: DISCONTINUED | OUTPATIENT
Start: 2024-06-17 | End: 2024-06-17 | Stop reason: HOSPADM

## 2024-06-17 RX ADMIN — FENTANYL CITRATE 25 MCG: 50 INJECTION, SOLUTION INTRAMUSCULAR; INTRAVENOUS at 13:36

## 2024-06-17 RX ADMIN — PROPOFOL 150 MG: 10 INJECTION, EMULSION INTRAVENOUS at 12:42

## 2024-06-17 RX ADMIN — FENTANYL CITRATE 25 MCG: 50 INJECTION, SOLUTION INTRAMUSCULAR; INTRAVENOUS at 13:08

## 2024-06-17 RX ADMIN — FENTANYL CITRATE 25 MCG: 50 INJECTION, SOLUTION INTRAMUSCULAR; INTRAVENOUS at 12:37

## 2024-06-17 RX ADMIN — HYDROMORPHONE HYDROCHLORIDE 0.25 MG: 2 INJECTION, SOLUTION INTRAMUSCULAR; INTRAVENOUS; SUBCUTANEOUS at 15:00

## 2024-06-17 RX ADMIN — HYDROMORPHONE HYDROCHLORIDE 0.25 MG: 2 INJECTION, SOLUTION INTRAMUSCULAR; INTRAVENOUS; SUBCUTANEOUS at 14:51

## 2024-06-17 RX ADMIN — CEFAZOLIN 2000 MG: 2 INJECTION, POWDER, FOR SOLUTION INTRAMUSCULAR; INTRAVENOUS at 12:49

## 2024-06-17 RX ADMIN — SODIUM CHLORIDE: 9 INJECTION, SOLUTION INTRAVENOUS at 12:26

## 2024-06-17 RX ADMIN — FENTANYL CITRATE 25 MCG: 50 INJECTION, SOLUTION INTRAMUSCULAR; INTRAVENOUS at 12:47

## 2024-06-17 RX ADMIN — ROCURONIUM BROMIDE 25 MG: 10 INJECTION, SOLUTION INTRAVENOUS at 12:58

## 2024-06-17 RX ADMIN — DEXAMETHASONE SODIUM PHOSPHATE 8 MG: 4 INJECTION, SOLUTION INTRAMUSCULAR; INTRAVENOUS at 13:01

## 2024-06-17 RX ADMIN — Medication 120 MG: at 12:42

## 2024-06-17 RX ADMIN — LIDOCAINE HYDROCHLORIDE 100 MG: 20 INJECTION, SOLUTION EPIDURAL; INFILTRATION; INTRACAUDAL; PERINEURAL at 12:39

## 2024-06-17 RX ADMIN — SODIUM CHLORIDE: 9 INJECTION, SOLUTION INTRAVENOUS at 12:36

## 2024-06-17 RX ADMIN — ACETAMINOPHEN 1000 MG: 500 TABLET ORAL at 11:53

## 2024-06-17 RX ADMIN — ONDANSETRON 4 MG: 2 INJECTION INTRAMUSCULAR; INTRAVENOUS at 13:01

## 2024-06-17 RX ADMIN — SODIUM CHLORIDE: 9 INJECTION, SOLUTION INTRAVENOUS at 14:01

## 2024-06-17 ASSESSMENT — PAIN - FUNCTIONAL ASSESSMENT: PAIN_FUNCTIONAL_ASSESSMENT: 0-10

## 2024-06-17 ASSESSMENT — PAIN DESCRIPTION - FREQUENCY
FREQUENCY: CONTINUOUS

## 2024-06-17 ASSESSMENT — PAIN DESCRIPTION - LOCATION
LOCATION: ABDOMEN

## 2024-06-17 ASSESSMENT — PAIN DESCRIPTION - DESCRIPTORS
DESCRIPTORS: SORE

## 2024-06-17 ASSESSMENT — PAIN DESCRIPTION - ONSET
ONSET: ON-GOING

## 2024-06-17 ASSESSMENT — PAIN SCALES - GENERAL
PAINLEVEL_OUTOF10: 5
PAINLEVEL_OUTOF10: 4
PAINLEVEL_OUTOF10: 5
PAINLEVEL_OUTOF10: 4

## 2024-06-17 ASSESSMENT — PAIN DESCRIPTION - PAIN TYPE
TYPE: SURGICAL PAIN

## 2024-06-17 ASSESSMENT — PAIN DESCRIPTION - ORIENTATION
ORIENTATION: MID

## 2024-06-17 ASSESSMENT — ENCOUNTER SYMPTOMS: SHORTNESS OF BREATH: 1

## 2024-06-17 NOTE — ANESTHESIA POSTPROCEDURE EVALUATION
Department of Anesthesiology  Postprocedure Note    Patient: Stephani Reyes  MRN: [id number]  YOB: 1970  Date of evaluation: 6/17/2024    Procedure Summary       Date: 06/17/24 Room / Location: [address]    Anesthesia Start: 1236 Anesthesia Stop: 1427    Procedures:       LAPAROSCOPIC CHOLECYSTECTOMY WITH CHOLANGIOGRAM (Abdomen)      OPEN UMBILICAL HERNIA REPAIR (Abdomen) Diagnosis:       Symptomatic cholelithiasis      Umbilical hernia      (Symptomatic cholelithiasis [K80.20])      (Umbilical hernia [K42.9])    Surgeons: Iam Nolasco MD Responsible Provider: Peggy Beltrán MD    Anesthesia Type: general ASA Status: 2            Anesthesia Type: No value filed.    Katarina Phase I: Katarina Score: 7    Katarina Phase II:      Anesthesia Post Evaluation    Patient location during evaluation: PACU  Patient participation: complete - patient participated  Level of consciousness: awake  Airway patency: patent  Nausea & Vomiting: no vomiting  Cardiovascular status: hemodynamically stable  Respiratory status: acceptable  Hydration status: euvolemic  Multimodal analgesia pain management approach    No notable events documented.

## 2024-06-17 NOTE — PROGRESS NOTES
Pt arrived from OR to PACU bay 8. Report received from OR staff. Pt arousable to voice. Surgical incisions dressings in place to ABD. Pt on 6L simple mask, NSR, and VSS. Will continue to monitor.

## 2024-06-17 NOTE — PROGRESS NOTES
Pt awake and alert at this time. Pt on RA, and VSS. Pt denies  nausea and pain being managed, tolerating PO. Pt meets criteria to be discharged from Phase 1.

## 2024-06-17 NOTE — DISCHARGE INSTRUCTIONS
[facility]      Iam Nolasco M.D.    [phone]                                                     [facility]   [address]          [place] [address]      POST-OPERATIVE INSTRUCTIONS FOR GALLBLADDER SURGERY    Call the office to schedule your post-operative appointment with your surgeon for two (2) weeks.     You will have surgical glue closing your incisions. Your incisions are waterproof.    You may shower.  Wash incisions gently, and pat them dry. Do not rub your incisions.    General guidelines for activity:   Avoid strenuous activity or lifting anything heavier than 15 pounds.    It is OK to be up  walking around, and walking up and down stairs.     Do what is comfortable: stop and rest when you feel tired.     Drink plenty of fluids and stay on a bland diet for 2-3 days after surgery.     Do not drive for three days and while taking your narcotic pain medicine.     Watch for signs of infection:  Excessive warmth or bright redness around your incisions  Leakage of bloody or cloudy fluid from you incisions  Fever over 100.5    During the laparoscopic procedure that you had, gas is pumped into the abdominal cavity.  You may feel abdominal, shoulder, or rib pain for a few days due to this gas.    You will have pain medicine ordered. Take as directed    If you experience constipation:  Increase your water intake  Increase your activity, walking is best.  A stool softener or mild laxative may be necessary if you still have not had a bowel movement ; call the office for further instructions.      ANESTHESIA DISCHARGE INSTRUCTIONS    Wear your seatbelt home.  You are under the influence of drugs-do not drink alcohol, drive, operate machinery, make any important decisions or sign any legal documents for 24 hours.  Children should not ride bikes, skate boards or play on gym sets for 24 hours after surgery.  A responsible

## 2024-06-17 NOTE — ANESTHESIA PRE PROCEDURE
Department of Anesthesiology  Preprocedure Note       Name:  Stephani Reyes   Age:  53 y.o.  :  1970                                          MRN:  [id number]         Date:  2024      Surgeon: Surgeon(s):  Iam Nolasco MD    Procedure: Procedure(s):  LAPAROSCOPIC CHOLECYSTECTOMY WITH CHOLANGIOGRAM  OPEN UMBILICAL HERNIA REPAIR    Medications prior to admission:   Prior to Admission medications    Medication Sig Start Date End Date Taking? Authorizing Provider   Vitamin D (CHOLECALCIFEROL) 25 MCG (1000 UT) TABS tablet Take 1 tablet by mouth daily    Oziel Thomson MD   COLLAGEN PO Take 1 tablet by mouth Daily    Oziel Thomson MD   Ibuprofen-diphenhydrAMINE Cit (ADVIL PM PO) Take 1 tablet by mouth nightly as needed (insomnia)    Oziel Thomson MD       Current medications:    Current Facility-Administered Medications   Medication Dose Route Frequency Provider Last Rate Last Admin    acetaminophen (TYLENOL) tablet 1,000 mg  1,000 mg Oral Once Urban Bolton MD        sodium chloride flush 0.9 % injection 5-40 mL  5-40 mL IntraVENous 2 times per day Iam Nolasco MD        sodium chloride flush 0.9 % injection 5-40 mL  5-40 mL IntraVENous PRN Iam Nolasco MD        0.9 % sodium chloride infusion   IntraVENous PRN Iam Nolasco MD        ceFAZolin (ANCEF) 2,000 mg in sterile water 20 mL IV syringe  2,000 mg IntraVENous On Call to OR Iam Nolasco MD           Allergies:    Allergies   Allergen Reactions    Crab Extract Itching    Shellfish Allergy Itching    Shrimp Extract Itching       Problem List:    Patient Active Problem List   Diagnosis Code    Calculus of gallbladder without cholecystitis without obstruction K80.20    History of hepatitis B Z86.19    Mixed hyperlipidemia E78.2    Positive AMY (antinuclear antibody) R76.8    Prediabetes R73.03    Symptomatic cholelithiasis K80.20    Umbilical hernia K42.9       Past

## 2024-06-17 NOTE — H&P
[facility]      I have reviewed the history and physical and examined the patient and find no relevant changes.    I have reviewed with the patient and/or family the risks, benefits, and alternatives to the procedure.    Iam Nolasco MD  6/17/2024

## 2024-06-17 NOTE — PROGRESS NOTES
Discharge instructions review with patient and  Chuck. All home medications have been reviewed, pt v/u. Pt discharged via wheelchair. Pt discharged with 1 RX and all belongings. Chuck taking stable pt home.

## 2024-06-17 NOTE — BRIEF OP NOTE
Brief Postoperative Note      Patient: Stephani Reyes  YOB: 1970  MRN: [id number]    Date of Procedure: 6/17/2024    Pre-Op Diagnosis Codes:     * Symptomatic cholelithiasis [K80.20]     * Umbilical hernia [K42.9]    Post-Op Diagnosis: Same  along with pelvic mass, and ascites       Procedure(s):  LAPAROSCOPIC CHOLECYSTECTOMY WITH CHOLANGIOGRAM  OPEN UMBILICAL HERNIA REPAIR    Surgeon(s):  Iam Nolasco MD    Assistant:  Surgical Assistant: Luna Thomas    Anesthesia: General    Estimated Blood Loss (mL): Minimal    Complications: None    Specimens:   ID Type Source Tests Collected by Time Destination   1 : 1- ABDOMINAL FLUID CULTURE-AEROBIC & ANAEROBIC Specimen Abdomen CULTURE, ANAEROBIC AND AEROBIC Iam Nolasco MD 6/17/2024 1306    A : A- ABDOMINAL FLUID FOR CYTOLOGY Body Fluid Fluid CYTOLOGY, NON-GYN Iam Nolasco MD 6/17/2024 1314    B : B- GALLBLADDER AND CONTENTS Tissue Tissue SURGICAL PATHOLOGY Iam Nolasco MD 6/17/2024 1340    C : C- OMENTUM BIOPSY Tissue Tissue SURGICAL PATHOLOGY Iam Nolasco MD 6/17/2024 1347    D : D- PELVIC MASS BIOPSY Tissue Tissue SURGICAL PATHOLOGY Iam Nolasco MD 6/17/2024 1353        Implants:  * No implants in log *      Drains: * No LDAs found *    Findings:  Infection Present At Time Of Surgery (PATOS) (choose all levels that have infection present):  No infection present  Other Findings: Large malignant appearing mass present in the pelvic region. Bx's done, cytology sent, cx's done. GB removed, UHR completed.    Electronically signed by Iam Nolasco MD on 6/17/2024 at 2:18 PM

## 2024-06-18 NOTE — OP NOTE
[address] [address]                            OPERATIVE REPORT      PATIENT NAME: TESSIE LEWIS              : 1970  MED REC NO: [id number]                      ROOM: ASC OR  ACCOUNT NO: [id number]                       ADMIT DATE: 2024  PROVIDER: Iam Nolasco MD      DATE OF PROCEDURE:  2024    SURGEON:  Iam Nolasco MD    PREOPERATIVE DIAGNOSES:    1. Symptomatic cholelithiasis, chronic cholecystitis.  2. Umbilical hernia.  3. Intraabdominal mass, pelvic origin possibly and mucoid intraabdominal ascites.    POSTOPERATIVE DIAGNOSES:    1. Symptomatic cholelithiasis, chronic cholecystitis.  2. Umbilical hernia.  3. Intraabdominal mass, pelvic origin possibly and mucoid intraabdominal ascites.    PROCEDURES:    1. Laparoscopic cholecystectomy with intraoperative cholangiogram.  2. Open umbilical hernia repair.  3. Excision of a 3 cm portion of lower abdominal intraabdominal mass and also excision of 2 areas of omentum, 7 cm specimen total for pathology.    ESTIMATED BLOOD LOSS:  Minimal.    COMPLICATIONS:  None.    FINDINGS:  The patient presents for gallbladder removal today for symptomatic cholelithiasis.  This was completed, umbilical hernia repair was also done.  The cholangiogram was normal.  The gallbladder had small stones and thickening consistent with chronic cholecystitis.  Also of note, the patient had mucoid ascites upon entering the abdomen and dense inflammatory change or reactive change from possible tumor throughout the areas of small bowel and omentum noted.  There was a large pelvic mass in the lower abdomen emanating up in the lower abdomen, which did appear to have malignant features with a multilobulated aspect.  On the right lower aspect of this, I removed just 3 cm section and sent this to Pathology.  I also did omentectomy for sampling of the omentum, aspirated fluid

## 2024-06-20 ENCOUNTER — TELEPHONE (OUTPATIENT)
Dept: SURGERY | Age: 54
End: 2024-06-20

## 2024-06-20 LAB — CANCER AG19-9 SERPL IA-ACNC: <2 U/ML (ref 0–35)

## 2024-06-20 NOTE — TELEPHONE ENCOUNTER
PO 6/17- LAPAROSCOPIC CHOLECYSTECTOMY WITH CHOLANGIOGRAM   Pt's spouse states incision above navel is leaking. Took a shower yesterday, can't tell if glue came off. Leakage started today.  Please call pt's spouse and advise.

## 2024-06-20 NOTE — TELEPHONE ENCOUNTER
I called and talked to Huber, advised that LAURA said this will happen with the ascites so just keep clean and covered, change dressing as needed.

## 2024-06-22 LAB
BACTERIA SPEC AEROBE CULT: NORMAL
BACTERIA SPEC ANAEROBE CULT: NORMAL

## 2024-07-02 ENCOUNTER — OFFICE VISIT (OUTPATIENT)
Dept: SURGERY | Age: 54
End: 2024-07-02

## 2024-07-02 VITALS — DIASTOLIC BLOOD PRESSURE: 82 MMHG | SYSTOLIC BLOOD PRESSURE: 128 MMHG | WEIGHT: 100 LBS | BODY MASS INDEX: 21.64 KG/M2

## 2024-07-02 DIAGNOSIS — C56.9 MALIGNANT NEOPLASM OF OVARY, UNSPECIFIED LATERALITY (HCC): Primary | ICD-10-CM

## 2024-07-02 DIAGNOSIS — K80.20 SYMPTOMATIC CHOLELITHIASIS: ICD-10-CM

## 2024-07-02 PROCEDURE — 99024 POSTOP FOLLOW-UP VISIT: CPT | Performed by: SURGERY

## 2024-07-02 NOTE — PROGRESS NOTES
treatments needed reviewed with pt and .  She will need Gyn / Onc as well.  Will see back here prn.      Iam Nolasco MD

## 2024-07-09 ENCOUNTER — HOSPITAL ENCOUNTER (OUTPATIENT)
Dept: CT IMAGING | Age: 54
Discharge: HOME | End: 2024-07-09
Attending: OBSTETRICS & GYNECOLOGY
Payer: COMMERCIAL

## 2024-07-09 ENCOUNTER — HOSPITAL ENCOUNTER (OUTPATIENT)
Age: 54
Discharge: HOME | End: 2024-07-09
Attending: OBSTETRICS & GYNECOLOGY
Payer: COMMERCIAL

## 2024-07-09 ENCOUNTER — HOSPITAL ENCOUNTER (OUTPATIENT)
Dept: MRI IMAGING | Age: 54
Discharge: HOME | End: 2024-07-09
Attending: OBSTETRICS & GYNECOLOGY
Payer: COMMERCIAL

## 2024-07-09 DIAGNOSIS — Z87.09 HISTORY OF PLEURAL EFFUSION: ICD-10-CM

## 2024-07-09 DIAGNOSIS — C53.8 MALIGNANT NEOPLASM OF CERVICAL STUMP (HCC): ICD-10-CM

## 2024-07-09 DIAGNOSIS — R19.00 PELVIC MASS: ICD-10-CM

## 2024-07-09 PROCEDURE — 71260 CT THORAX DX C+: CPT

## 2024-07-09 PROCEDURE — 6360000004 HC RX CONTRAST MEDICATION: Performed by: OBSTETRICS & GYNECOLOGY

## 2024-07-09 PROCEDURE — 2580000003 HC RX 258: Performed by: OBSTETRICS & GYNECOLOGY

## 2024-07-09 PROCEDURE — 72197 MRI PELVIS W/O & W/DYE: CPT

## 2024-07-09 PROCEDURE — A9577 INJ MULTIHANCE: HCPCS | Performed by: OBSTETRICS & GYNECOLOGY

## 2024-07-09 RX ORDER — SODIUM CHLORIDE 9 MG/ML
INJECTION, SOLUTION INTRAVENOUS CONTINUOUS
Status: DISCONTINUED | OUTPATIENT
Start: 2024-07-09 | End: 2024-07-10 | Stop reason: HOSPADM

## 2024-07-09 RX ADMIN — GADOBENATE DIMEGLUMINE 10 ML: 529 INJECTION, SOLUTION INTRAVENOUS at 20:10

## 2024-07-09 RX ADMIN — SODIUM CHLORIDE 50 ML: 9 INJECTION, SOLUTION INTRAVENOUS at 20:11

## 2024-07-09 RX ADMIN — IOPAMIDOL 75 ML: 755 INJECTION, SOLUTION INTRAVENOUS at 14:10

## 2024-07-11 ENCOUNTER — HOSPITAL ENCOUNTER (OUTPATIENT)
Dept: WOMENS IMAGING | Age: 54
Discharge: HOME | End: 2024-07-11
Payer: COMMERCIAL

## 2024-07-11 VITALS — WEIGHT: 99.6 LBS | BODY MASS INDEX: 20.08 KG/M2 | HEIGHT: 59 IN

## 2024-07-11 DIAGNOSIS — Z12.31 VISIT FOR SCREENING MAMMOGRAM: ICD-10-CM

## 2024-07-11 PROCEDURE — 77063 BREAST TOMOSYNTHESIS BI: CPT

## 2024-07-31 ENCOUNTER — TELEPHONE (OUTPATIENT)
Dept: PRIMARY CARE CLINIC | Age: 54
End: 2024-07-31

## 2024-07-31 ENCOUNTER — HOSPITAL ENCOUNTER (OUTPATIENT)
Dept: VASCULAR LAB | Age: 54
Discharge: HOME | End: 2024-08-02
Payer: COMMERCIAL

## 2024-07-31 DIAGNOSIS — R60.0 LOWER EXTREMITY EDEMA: ICD-10-CM

## 2024-07-31 PROCEDURE — 93971 EXTREMITY STUDY: CPT

## 2024-07-31 NOTE — TELEPHONE ENCOUNTER
Spoke with the pt's . STD were sent because  the pt has a dx of cancer. Dr Ogden office also received a copy. Informed  if PCP is needing to fill out the form, pt will need to come in for an appt.  will call us back if needed

## 2024-08-20 ENCOUNTER — TELEPHONE (OUTPATIENT)
Dept: PRIMARY CARE CLINIC | Age: 54
End: 2024-08-20

## 2024-08-20 NOTE — TELEPHONE ENCOUNTER
CHRISTOPHE Maloney from [facility] calling and wanted to let pt's PCP she is the new  for pt. Amara # [phone]

## 2024-09-04 ENCOUNTER — PREP FOR PROCEDURE (OUTPATIENT)
Dept: SURGERY | Age: 54
End: 2024-09-04

## 2024-09-04 DIAGNOSIS — R19.00 PELVIC MASS: ICD-10-CM

## 2024-09-04 PROBLEM — C56.9 OVARIAN CANCER (HCC): Status: ACTIVE | Noted: 2024-09-04

## 2024-09-12 RX ORDER — FERROUS SULFATE 325(65) MG
325 TABLET ORAL 2 TIMES DAILY
COMMUNITY
Start: 2024-08-30

## 2024-09-12 RX ORDER — DIPHENHYDRAMINE HCL 25 MG
25 CAPSULE ORAL NIGHTLY PRN
COMMUNITY

## 2024-09-16 ENCOUNTER — ANESTHESIA (OUTPATIENT)
Dept: OPERATING ROOM | Age: 54
End: 2024-09-16
Payer: COMMERCIAL

## 2024-09-16 ENCOUNTER — ANESTHESIA EVENT (OUTPATIENT)
Dept: OPERATING ROOM | Age: 54
End: 2024-09-16
Payer: COMMERCIAL

## 2024-09-16 ENCOUNTER — APPOINTMENT (OUTPATIENT)
Dept: GENERAL RADIOLOGY | Age: 54
End: 2024-09-16
Attending: SURGERY
Payer: COMMERCIAL

## 2024-09-16 ENCOUNTER — HOSPITAL ENCOUNTER (OUTPATIENT)
Age: 54
Setting detail: OUTPATIENT SURGERY
Discharge: HOME | End: 2024-09-16
Attending: SURGERY | Admitting: SURGERY
Payer: COMMERCIAL

## 2024-09-16 VITALS
TEMPERATURE: 97.3 F | SYSTOLIC BLOOD PRESSURE: 126 MMHG | BODY MASS INDEX: 18.29 KG/M2 | HEIGHT: 57 IN | HEART RATE: 62 BPM | RESPIRATION RATE: 16 BRPM | WEIGHT: 84.8 LBS | OXYGEN SATURATION: 98 % | DIASTOLIC BLOOD PRESSURE: 85 MMHG

## 2024-09-16 DIAGNOSIS — R19.00 PELVIC MASS: Primary | ICD-10-CM

## 2024-09-16 PROBLEM — C54.1 ENDOMETRIAL CANCER (HCC): Status: ACTIVE | Noted: 2024-09-16

## 2024-09-16 PROCEDURE — 3600000012 HC SURGERY LEVEL 2 ADDTL 15MIN: Performed by: SURGERY

## 2024-09-16 PROCEDURE — A4217 STERILE WATER/SALINE, 500 ML: HCPCS | Performed by: SURGERY

## 2024-09-16 PROCEDURE — 7100000001 HC PACU RECOVERY - ADDTL 15 MIN: Performed by: SURGERY

## 2024-09-16 PROCEDURE — 2580000003 HC RX 258: Performed by: SURGERY

## 2024-09-16 PROCEDURE — 7100000011 HC PHASE II RECOVERY - ADDTL 15 MIN: Performed by: SURGERY

## 2024-09-16 PROCEDURE — 3700000001 HC ADD 15 MINUTES (ANESTHESIA): Performed by: SURGERY

## 2024-09-16 PROCEDURE — C1788 PORT, INDWELLING, IMP: HCPCS | Performed by: SURGERY

## 2024-09-16 PROCEDURE — 71045 X-RAY EXAM CHEST 1 VIEW: CPT

## 2024-09-16 PROCEDURE — 6370000000 HC RX 637 (ALT 250 FOR IP): Performed by: ANESTHESIOLOGY

## 2024-09-16 PROCEDURE — 3600000002 HC SURGERY LEVEL 2 BASE: Performed by: SURGERY

## 2024-09-16 PROCEDURE — 6360000002 HC RX W HCPCS: Performed by: SURGERY

## 2024-09-16 PROCEDURE — 6360000002 HC RX W HCPCS: Performed by: NURSE ANESTHETIST, CERTIFIED REGISTERED

## 2024-09-16 PROCEDURE — 2500000003 HC RX 250 WO HCPCS: Performed by: NURSE ANESTHETIST, CERTIFIED REGISTERED

## 2024-09-16 PROCEDURE — 2709999900 HC NON-CHARGEABLE SUPPLY: Performed by: SURGERY

## 2024-09-16 PROCEDURE — 7100000000 HC PACU RECOVERY - FIRST 15 MIN: Performed by: SURGERY

## 2024-09-16 PROCEDURE — 7100000010 HC PHASE II RECOVERY - FIRST 15 MIN: Performed by: SURGERY

## 2024-09-16 PROCEDURE — 36561 INSERT TUNNELED CV CATH: CPT | Performed by: SURGERY

## 2024-09-16 PROCEDURE — 77001 FLUOROGUIDE FOR VEIN DEVICE: CPT | Performed by: SURGERY

## 2024-09-16 PROCEDURE — 3700000000 HC ANESTHESIA ATTENDED CARE: Performed by: SURGERY

## 2024-09-16 DEVICE — PORT INFUS SGL LUMN ATTCH POLYUR OPN END CATH 8FR POWERPRT: Type: IMPLANTABLE DEVICE | Site: CHEST | Status: FUNCTIONAL

## 2024-09-16 RX ORDER — OXYCODONE HYDROCHLORIDE 5 MG/1
5 TABLET ORAL
Status: DISCONTINUED | OUTPATIENT
Start: 2024-09-16 | End: 2024-09-16 | Stop reason: HOSPADM

## 2024-09-16 RX ORDER — HYDROCODONE BITARTRATE AND ACETAMINOPHEN 5; 325 MG/1; MG/1
1 TABLET ORAL EVERY 4 HOURS PRN
Qty: 10 TABLET | Refills: 0 | Status: SHIPPED | OUTPATIENT
Start: 2024-09-16 | End: 2024-09-23

## 2024-09-16 RX ORDER — DEXAMETHASONE SODIUM PHOSPHATE 4 MG/ML
INJECTION, SOLUTION INTRA-ARTICULAR; INTRALESIONAL; INTRAMUSCULAR; INTRAVENOUS; SOFT TISSUE
Status: DISCONTINUED | OUTPATIENT
Start: 2024-09-16 | End: 2024-09-16 | Stop reason: SDUPTHER

## 2024-09-16 RX ORDER — PROPOFOL 10 MG/ML
INJECTION, EMULSION INTRAVENOUS
Status: DISCONTINUED | OUTPATIENT
Start: 2024-09-16 | End: 2024-09-16 | Stop reason: SDUPTHER

## 2024-09-16 RX ORDER — ACETAMINOPHEN 325 MG/1
650 TABLET ORAL EVERY 6 HOURS PRN
COMMUNITY

## 2024-09-16 RX ORDER — SODIUM CHLORIDE 9 MG/ML
INJECTION, SOLUTION INTRAVENOUS CONTINUOUS
Status: DISCONTINUED | OUTPATIENT
Start: 2024-09-16 | End: 2024-09-16 | Stop reason: HOSPADM

## 2024-09-16 RX ORDER — HYDROMORPHONE HYDROCHLORIDE 2 MG/ML
0.5 INJECTION, SOLUTION INTRAMUSCULAR; INTRAVENOUS; SUBCUTANEOUS EVERY 5 MIN PRN
Status: DISCONTINUED | OUTPATIENT
Start: 2024-09-16 | End: 2024-09-16 | Stop reason: HOSPADM

## 2024-09-16 RX ORDER — SODIUM CHLORIDE 9 MG/ML
INJECTION, SOLUTION INTRAVENOUS PRN
Status: DISCONTINUED | OUTPATIENT
Start: 2024-09-16 | End: 2024-09-16 | Stop reason: HOSPADM

## 2024-09-16 RX ORDER — SODIUM CHLORIDE 0.9 % (FLUSH) 0.9 %
5-40 SYRINGE (ML) INJECTION PRN
Status: DISCONTINUED | OUTPATIENT
Start: 2024-09-16 | End: 2024-09-16 | Stop reason: HOSPADM

## 2024-09-16 RX ORDER — ONDANSETRON 2 MG/ML
INJECTION INTRAMUSCULAR; INTRAVENOUS
Status: DISCONTINUED | OUTPATIENT
Start: 2024-09-16 | End: 2024-09-16 | Stop reason: SDUPTHER

## 2024-09-16 RX ORDER — ONDANSETRON 2 MG/ML
4 INJECTION INTRAMUSCULAR; INTRAVENOUS
Status: DISCONTINUED | OUTPATIENT
Start: 2024-09-16 | End: 2024-09-16 | Stop reason: HOSPADM

## 2024-09-16 RX ORDER — SODIUM CHLORIDE 0.9 % (FLUSH) 0.9 %
5-40 SYRINGE (ML) INJECTION EVERY 12 HOURS SCHEDULED
Status: DISCONTINUED | OUTPATIENT
Start: 2024-09-16 | End: 2024-09-16 | Stop reason: HOSPADM

## 2024-09-16 RX ORDER — HYDROMORPHONE HYDROCHLORIDE 2 MG/ML
0.25 INJECTION, SOLUTION INTRAMUSCULAR; INTRAVENOUS; SUBCUTANEOUS EVERY 5 MIN PRN
Status: DISCONTINUED | OUTPATIENT
Start: 2024-09-16 | End: 2024-09-16 | Stop reason: HOSPADM

## 2024-09-16 RX ORDER — NALOXONE HYDROCHLORIDE 0.4 MG/ML
INJECTION, SOLUTION INTRAMUSCULAR; INTRAVENOUS; SUBCUTANEOUS PRN
Status: DISCONTINUED | OUTPATIENT
Start: 2024-09-16 | End: 2024-09-16 | Stop reason: HOSPADM

## 2024-09-16 RX ORDER — LIDOCAINE HYDROCHLORIDE 20 MG/ML
INJECTION, SOLUTION INFILTRATION; PERINEURAL
Status: DISCONTINUED | OUTPATIENT
Start: 2024-09-16 | End: 2024-09-16 | Stop reason: SDUPTHER

## 2024-09-16 RX ORDER — BUPIVACAINE HYDROCHLORIDE 5 MG/ML
INJECTION, SOLUTION EPIDURAL; INTRACAUDAL
Status: COMPLETED | OUTPATIENT
Start: 2024-09-16 | End: 2024-09-16

## 2024-09-16 RX ORDER — ACETAMINOPHEN 500 MG
1000 TABLET ORAL ONCE
Status: COMPLETED | OUTPATIENT
Start: 2024-09-16 | End: 2024-09-16

## 2024-09-16 RX ADMIN — DEXAMETHASONE SODIUM PHOSPHATE 8 MG: 4 INJECTION, SOLUTION INTRAMUSCULAR; INTRAVENOUS at 11:18

## 2024-09-16 RX ADMIN — ONDANSETRON 4 MG: 2 INJECTION INTRAMUSCULAR; INTRAVENOUS at 11:18

## 2024-09-16 RX ADMIN — SODIUM CHLORIDE: 9 INJECTION, SOLUTION INTRAVENOUS at 10:29

## 2024-09-16 RX ADMIN — LIDOCAINE HYDROCHLORIDE 60 MG: 20 INJECTION, SOLUTION INFILTRATION; PERINEURAL at 11:13

## 2024-09-16 RX ADMIN — PROPOFOL 150 MG: 10 INJECTION, EMULSION INTRAVENOUS at 11:13

## 2024-09-16 RX ADMIN — CEFAZOLIN 2000 MG: 2 INJECTION, POWDER, FOR SOLUTION INTRAMUSCULAR; INTRAVENOUS at 11:11

## 2024-09-16 RX ADMIN — ACETAMINOPHEN 1000 MG: 500 TABLET ORAL at 10:30

## 2024-09-16 ASSESSMENT — ENCOUNTER SYMPTOMS: SHORTNESS OF BREATH: 1

## 2024-09-16 ASSESSMENT — PAIN SCALES - GENERAL: PAINLEVEL_OUTOF10: 0

## 2024-09-16 ASSESSMENT — PAIN - FUNCTIONAL ASSESSMENT: PAIN_FUNCTIONAL_ASSESSMENT: NONE - DENIES PAIN

## 2024-10-22 ENCOUNTER — HOSPITAL ENCOUNTER (INPATIENT)
Age: 54
LOS: 19 days | Discharge: HOME | DRG: 853 | End: 2024-11-10
Attending: STUDENT IN AN ORGANIZED HEALTH CARE EDUCATION/TRAINING PROGRAM | Admitting: INTERNAL MEDICINE
Payer: COMMERCIAL

## 2024-10-22 ENCOUNTER — APPOINTMENT (OUTPATIENT)
Dept: CT IMAGING | Age: 54
DRG: 853 | End: 2024-10-22
Payer: COMMERCIAL

## 2024-10-22 DIAGNOSIS — A41.9 SEPTICEMIA (HCC): ICD-10-CM

## 2024-10-22 DIAGNOSIS — K65.1 INTRA-ABDOMINAL ABSCESS (HCC): Primary | ICD-10-CM

## 2024-10-22 DIAGNOSIS — D84.9 IMMUNOCOMPROMISED (HCC): ICD-10-CM

## 2024-10-22 DIAGNOSIS — K63.1 PERFORATED SIGMOID COLON (HCC): ICD-10-CM

## 2024-10-22 LAB
ALBUMIN SERPL-MCNC: 4 G/DL (ref 3.4–5)
ALBUMIN/GLOB SERPL: 1.3 {RATIO} (ref 1.1–2.2)
ALP SERPL-CCNC: 74 U/L (ref 40–129)
ALT SERPL-CCNC: 14 U/L (ref 10–40)
ANION GAP SERPL CALCULATED.3IONS-SCNC: 11 MMOL/L (ref 3–16)
AST SERPL-CCNC: 18 U/L (ref 15–37)
BASOPHILS # BLD: 0 K/UL (ref 0–0.2)
BASOPHILS NFR BLD: 0 %
BILIRUB SERPL-MCNC: 0.6 MG/DL (ref 0–1)
BUN SERPL-MCNC: 21 MG/DL (ref 7–20)
CALCIUM SERPL-MCNC: 8.7 MG/DL (ref 8.3–10.6)
CHLORIDE SERPL-SCNC: 102 MMOL/L (ref 99–110)
CO2 SERPL-SCNC: 22 MMOL/L (ref 21–32)
CREAT SERPL-MCNC: 0.6 MG/DL (ref 0.6–1.1)
DEPRECATED RDW RBC AUTO: 16 % (ref 12.4–15.4)
EOSINOPHIL # BLD: 0 K/UL (ref 0–0.6)
EOSINOPHIL NFR BLD: 0 %
GFR SERPLBLD CREATININE-BSD FMLA CKD-EPI: >90 ML/MIN/{1.73_M2}
GLUCOSE SERPL-MCNC: 164 MG/DL (ref 70–99)
HCT VFR BLD AUTO: 37.9 % (ref 36–48)
HGB BLD-MCNC: 12.6 G/DL (ref 12–16)
LACTATE BLDV-SCNC: 2.2 MMOL/L (ref 0.4–1.9)
LACTATE BLDV-SCNC: 2.6 MMOL/L (ref 0.4–1.9)
LIPASE SERPL-CCNC: 19 U/L (ref 13–60)
LYMPHOCYTES # BLD: 0.4 K/UL (ref 1–5.1)
LYMPHOCYTES NFR BLD: 2.1 %
MAGNESIUM SERPL-MCNC: 2.05 MG/DL (ref 1.8–2.4)
MCH RBC QN AUTO: 28.1 PG (ref 26–34)
MCHC RBC AUTO-ENTMCNC: 33.3 G/DL (ref 31–36)
MCV RBC AUTO: 84.5 FL (ref 80–100)
MONOCYTES # BLD: 0.3 K/UL (ref 0–1.3)
MONOCYTES NFR BLD: 1.4 %
NEUTROPHILS # BLD: 19.9 K/UL (ref 1.7–7.7)
NEUTROPHILS NFR BLD: 96.5 %
PLATELET # BLD AUTO: 203 K/UL (ref 135–450)
PMV BLD AUTO: 8.5 FL (ref 5–10.5)
POTASSIUM SERPL-SCNC: 4.2 MMOL/L (ref 3.5–5.1)
PROT SERPL-MCNC: 7 G/DL (ref 6.4–8.2)
RBC # BLD AUTO: 4.49 M/UL (ref 4–5.2)
SODIUM SERPL-SCNC: 135 MMOL/L (ref 136–145)
WBC # BLD AUTO: 20.7 K/UL (ref 4–11)

## 2024-10-22 PROCEDURE — 83690 ASSAY OF LIPASE: CPT

## 2024-10-22 PROCEDURE — 74177 CT ABD & PELVIS W/CONTRAST: CPT

## 2024-10-22 PROCEDURE — 2580000003 HC RX 258: Performed by: NURSE PRACTITIONER

## 2024-10-22 PROCEDURE — 99285 EMERGENCY DEPT VISIT HI MDM: CPT

## 2024-10-22 PROCEDURE — 6360000002 HC RX W HCPCS: Performed by: NURSE PRACTITIONER

## 2024-10-22 PROCEDURE — 6370000000 HC RX 637 (ALT 250 FOR IP): Performed by: NURSE PRACTITIONER

## 2024-10-22 PROCEDURE — 83605 ASSAY OF LACTIC ACID: CPT

## 2024-10-22 PROCEDURE — 6360000004 HC RX CONTRAST MEDICATION: Performed by: STUDENT IN AN ORGANIZED HEALTH CARE EDUCATION/TRAINING PROGRAM

## 2024-10-22 PROCEDURE — 87040 BLOOD CULTURE FOR BACTERIA: CPT

## 2024-10-22 PROCEDURE — 1200000000 HC SEMI PRIVATE

## 2024-10-22 PROCEDURE — 85025 COMPLETE CBC W/AUTO DIFF WBC: CPT

## 2024-10-22 PROCEDURE — 83735 ASSAY OF MAGNESIUM: CPT

## 2024-10-22 PROCEDURE — 6360000002 HC RX W HCPCS: Performed by: STUDENT IN AN ORGANIZED HEALTH CARE EDUCATION/TRAINING PROGRAM

## 2024-10-22 PROCEDURE — 80053 COMPREHEN METABOLIC PANEL: CPT

## 2024-10-22 PROCEDURE — 96374 THER/PROPH/DIAG INJ IV PUSH: CPT

## 2024-10-22 PROCEDURE — 96375 TX/PRO/DX INJ NEW DRUG ADDON: CPT

## 2024-10-22 PROCEDURE — 2580000003 HC RX 258: Performed by: STUDENT IN AN ORGANIZED HEALTH CARE EDUCATION/TRAINING PROGRAM

## 2024-10-22 RX ORDER — METRONIDAZOLE 500 MG/100ML
500 INJECTION, SOLUTION INTRAVENOUS EVERY 8 HOURS
Status: DISCONTINUED | OUTPATIENT
Start: 2024-10-22 | End: 2024-10-23

## 2024-10-22 RX ORDER — IOPAMIDOL 755 MG/ML
75 INJECTION, SOLUTION INTRAVASCULAR
Status: COMPLETED | OUTPATIENT
Start: 2024-10-22 | End: 2024-10-22

## 2024-10-22 RX ORDER — LANOLIN ALCOHOL/MO/W.PET/CERES
3 CREAM (GRAM) TOPICAL NIGHTLY PRN
Status: DISCONTINUED | OUTPATIENT
Start: 2024-10-22 | End: 2024-11-10 | Stop reason: HOSPADM

## 2024-10-22 RX ORDER — ONDANSETRON 2 MG/ML
4 INJECTION INTRAMUSCULAR; INTRAVENOUS ONCE
Status: COMPLETED | OUTPATIENT
Start: 2024-10-22 | End: 2024-10-22

## 2024-10-22 RX ORDER — MAGNESIUM SULFATE IN WATER 40 MG/ML
2000 INJECTION, SOLUTION INTRAVENOUS PRN
Status: DISCONTINUED | OUTPATIENT
Start: 2024-10-22 | End: 2024-11-10 | Stop reason: HOSPADM

## 2024-10-22 RX ORDER — POTASSIUM CHLORIDE 7.45 MG/ML
10 INJECTION INTRAVENOUS PRN
Status: ACTIVE | OUTPATIENT
Start: 2024-10-22 | End: 2024-10-24

## 2024-10-22 RX ORDER — MORPHINE SULFATE 4 MG/ML
4 INJECTION, SOLUTION INTRAMUSCULAR; INTRAVENOUS
Status: COMPLETED | OUTPATIENT
Start: 2024-10-22 | End: 2024-10-22

## 2024-10-22 RX ORDER — SODIUM CHLORIDE 9 MG/ML
INJECTION, SOLUTION INTRAVENOUS CONTINUOUS
Status: DISCONTINUED | OUTPATIENT
Start: 2024-10-22 | End: 2024-10-25

## 2024-10-22 RX ORDER — ACETAMINOPHEN 325 MG/1
650 TABLET ORAL EVERY 6 HOURS PRN
Status: DISCONTINUED | OUTPATIENT
Start: 2024-10-22 | End: 2024-10-31

## 2024-10-22 RX ORDER — ONDANSETRON 4 MG/1
4 TABLET, ORALLY DISINTEGRATING ORAL EVERY 8 HOURS PRN
Status: DISCONTINUED | OUTPATIENT
Start: 2024-10-22 | End: 2024-11-10 | Stop reason: HOSPADM

## 2024-10-22 RX ORDER — POTASSIUM CHLORIDE 1500 MG/1
40 TABLET, EXTENDED RELEASE ORAL PRN
Status: DISPENSED | OUTPATIENT
Start: 2024-10-22 | End: 2024-10-24

## 2024-10-22 RX ORDER — SODIUM CHLORIDE 9 MG/ML
INJECTION, SOLUTION INTRAVENOUS PRN
Status: DISCONTINUED | OUTPATIENT
Start: 2024-10-22 | End: 2024-11-10 | Stop reason: HOSPADM

## 2024-10-22 RX ORDER — POLYETHYLENE GLYCOL 3350 17 G/17G
17 POWDER, FOR SOLUTION ORAL DAILY PRN
Status: DISCONTINUED | OUTPATIENT
Start: 2024-10-22 | End: 2024-11-10 | Stop reason: HOSPADM

## 2024-10-22 RX ORDER — ACETAMINOPHEN 650 MG/1
650 SUPPOSITORY RECTAL EVERY 6 HOURS PRN
Status: DISCONTINUED | OUTPATIENT
Start: 2024-10-22 | End: 2024-10-31

## 2024-10-22 RX ORDER — ONDANSETRON 2 MG/ML
4 INJECTION INTRAMUSCULAR; INTRAVENOUS EVERY 6 HOURS PRN
Status: DISCONTINUED | OUTPATIENT
Start: 2024-10-22 | End: 2024-11-10 | Stop reason: HOSPADM

## 2024-10-22 RX ORDER — MORPHINE SULFATE 2 MG/ML
2 INJECTION, SOLUTION INTRAMUSCULAR; INTRAVENOUS
Status: DISCONTINUED | OUTPATIENT
Start: 2024-10-22 | End: 2024-11-10 | Stop reason: HOSPADM

## 2024-10-22 RX ORDER — METRONIDAZOLE 500 MG/100ML
500 INJECTION, SOLUTION INTRAVENOUS ONCE
Status: DISCONTINUED | OUTPATIENT
Start: 2024-10-22 | End: 2024-10-22

## 2024-10-22 RX ORDER — ENOXAPARIN SODIUM 100 MG/ML
30 INJECTION SUBCUTANEOUS DAILY
Status: DISCONTINUED | OUTPATIENT
Start: 2024-10-23 | End: 2024-11-10 | Stop reason: HOSPADM

## 2024-10-22 RX ORDER — SODIUM CHLORIDE 0.9 % (FLUSH) 0.9 %
5-40 SYRINGE (ML) INJECTION PRN
Status: DISCONTINUED | OUTPATIENT
Start: 2024-10-22 | End: 2024-11-10 | Stop reason: HOSPADM

## 2024-10-22 RX ORDER — SODIUM CHLORIDE, SODIUM LACTATE, POTASSIUM CHLORIDE, CALCIUM CHLORIDE 600; 310; 30; 20 MG/100ML; MG/100ML; MG/100ML; MG/100ML
INJECTION, SOLUTION INTRAVENOUS CONTINUOUS
Status: DISCONTINUED | OUTPATIENT
Start: 2024-10-22 | End: 2024-10-22 | Stop reason: RX

## 2024-10-22 RX ORDER — SODIUM CHLORIDE 0.9 % (FLUSH) 0.9 %
5-40 SYRINGE (ML) INJECTION EVERY 12 HOURS SCHEDULED
Status: DISCONTINUED | OUTPATIENT
Start: 2024-10-22 | End: 2024-11-10 | Stop reason: HOSPADM

## 2024-10-22 RX ADMIN — CEFEPIME 2000 MG: 2 INJECTION, POWDER, FOR SOLUTION INTRAVENOUS at 19:58

## 2024-10-22 RX ADMIN — ONDANSETRON 4 MG: 2 INJECTION, SOLUTION INTRAMUSCULAR; INTRAVENOUS at 17:19

## 2024-10-22 RX ADMIN — ACETAMINOPHEN 650 MG: 325 TABLET ORAL at 21:28

## 2024-10-22 RX ADMIN — SODIUM CHLORIDE: 9 INJECTION, SOLUTION INTRAVENOUS at 21:40

## 2024-10-22 RX ADMIN — Medication 10 ML: at 21:30

## 2024-10-22 RX ADMIN — IOPAMIDOL 75 ML: 755 INJECTION, SOLUTION INTRAVENOUS at 18:06

## 2024-10-22 RX ADMIN — METRONIDAZOLE 500 MG: 500 INJECTION, SOLUTION INTRAVENOUS at 21:41

## 2024-10-22 RX ADMIN — MORPHINE SULFATE 4 MG: 4 INJECTION, SOLUTION INTRAMUSCULAR; INTRAVENOUS at 17:19

## 2024-10-22 ASSESSMENT — PAIN SCALES - GENERAL
PAINLEVEL_OUTOF10: 9
PAINLEVEL_OUTOF10: 5
PAINLEVEL_OUTOF10: 3

## 2024-10-22 ASSESSMENT — PAIN - FUNCTIONAL ASSESSMENT: PAIN_FUNCTIONAL_ASSESSMENT: 0-10

## 2024-10-22 ASSESSMENT — PAIN DESCRIPTION - LOCATION
LOCATION: ABDOMEN
LOCATION: ABDOMEN

## 2024-10-22 ASSESSMENT — LIFESTYLE VARIABLES
HOW OFTEN DO YOU HAVE A DRINK CONTAINING ALCOHOL: NEVER
HOW MANY STANDARD DRINKS CONTAINING ALCOHOL DO YOU HAVE ON A TYPICAL DAY: PATIENT DOES NOT DRINK

## 2024-10-22 NOTE — ED PROVIDER NOTES
[facility]COLOGY  EMERGENCY DEPARTMENT ENCOUNTER      Pt Name: Stephani Reyes  MRN: [id number]  Birthdate 1970  Date of evaluation: 10/22/2024  Provider: Genet Rios MD    CHIEF COMPLAINT       Chief Complaint   Patient presents with    Abdominal Pain     States lower abd pain since last night, has ovarian cancer and is undergoing chemo. Denies diarrhea. Last chemo treatment was yesterday          HISTORY OF PRESENT ILLNESS   (Location/Symptom, Timing/Onset, Context/Setting, Quality, Duration, Modifying Factors, Severity)  Note limiting factors.   Stephani Reyes is a 53 y.o. female who presents to the emergency department with pain to the bilateral lower abdominal quadrants that started yesterday around 9 PM, about 1 hour after she had finished her dinner.  She ate her regular meal at a [email or web address] restaurant.  Pain is sharp and stabbing, constant in nature.  Associated with some mild nausea.  She denies any blood.  Had a solid bowel movement yesterday.  No diarrhea or fevers.  No dysuria.  Last chemo was yesterday.  Has had 2 doses total.  Chart reviewed: hx of endometrial cancer and intra abdominal abscess. Underwent port placement for chemo 9/16/24.  History of hepatitis B.  Nursing Notes were reviewed.    REVIEW OF SYSTEMS    (2-9 systems for level 4, 10 or more for level 5)     Review of Systems    Except as noted above the remainder of the review of systems was reviewed and negative.       PAST MEDICAL HISTORY     Past Medical History:   Diagnosis Date    Chronic chest pain 01/24/2024    Due to chronic pleural disease.  Was referred to pain clinic.    Diverticulosis of colon 05/05/2023    Essential hypertension 02/25/2021    Hyperlipidemia     Ovarian cancer (HCC) 06/17/2024    Endometrioid ovarian cancer stage IIa    Positive QuantiFERON-TB Gold test 09/13/2023    Uterine leiomyoma 04/27/2022         SURGICAL HISTORY       Past Surgical History:   Procedure Laterality Date

## 2024-10-23 ENCOUNTER — APPOINTMENT (OUTPATIENT)
Dept: GENERAL RADIOLOGY | Age: 54
DRG: 853 | End: 2024-10-23
Payer: COMMERCIAL

## 2024-10-23 PROBLEM — K65.1 INTRA-ABDOMINAL ABSCESS (HCC): Status: ACTIVE | Noted: 2024-10-23

## 2024-10-23 PROBLEM — Z95.828 PORT-A-CATH IN PLACE: Status: ACTIVE | Noted: 2024-10-23

## 2024-10-23 PROBLEM — R97.1 ELEVATED CA-125: Status: ACTIVE | Noted: 2024-10-23

## 2024-10-23 PROBLEM — Z79.899 ON ANTINEOPLASTIC CHEMOTHERAPY: Status: ACTIVE | Noted: 2024-10-23

## 2024-10-23 PROBLEM — R97.0 CARCINOEMBRYONIC ANTIGEN (CEA) ELEVATION: Status: ACTIVE | Noted: 2024-10-23

## 2024-10-23 PROBLEM — D84.9 IMMUNOCOMPROMISED (HCC): Status: ACTIVE | Noted: 2024-10-23

## 2024-10-23 PROBLEM — Z79.69 ON ANTINEOPLASTIC CHEMOTHERAPY: Status: ACTIVE | Noted: 2024-10-23

## 2024-10-23 LAB
ALBUMIN SERPL-MCNC: 3 G/DL (ref 3.4–5)
ALBUMIN/GLOB SERPL: 1 {RATIO} (ref 1.1–2.2)
ALP SERPL-CCNC: 68 U/L (ref 40–129)
ALT SERPL-CCNC: 13 U/L (ref 10–40)
ANION GAP SERPL CALCULATED.3IONS-SCNC: 10 MMOL/L (ref 3–16)
AST SERPL-CCNC: 15 U/L (ref 15–37)
BASOPHILS # BLD: 0 K/UL (ref 0–0.2)
BASOPHILS NFR BLD: 0.1 %
BILIRUB SERPL-MCNC: 0.6 MG/DL (ref 0–1)
BUN SERPL-MCNC: 20 MG/DL (ref 7–20)
CALCIUM SERPL-MCNC: 8.2 MG/DL (ref 8.3–10.6)
CHLORIDE SERPL-SCNC: 101 MMOL/L (ref 99–110)
CO2 SERPL-SCNC: 21 MMOL/L (ref 21–32)
CREAT SERPL-MCNC: 0.7 MG/DL (ref 0.6–1.1)
CRP SERPL-MCNC: 284 MG/L (ref 0–5.1)
DEPRECATED RDW RBC AUTO: 16.4 % (ref 12.4–15.4)
EKG ATRIAL RATE: 104 BPM
EKG DIAGNOSIS: NORMAL
EKG P AXIS: 32 DEGREES
EKG P-R INTERVAL: 130 MS
EKG Q-T INTERVAL: 322 MS
EKG QRS DURATION: 70 MS
EKG QTC CALCULATION (BAZETT): 423 MS
EKG R AXIS: 64 DEGREES
EKG T AXIS: 40 DEGREES
EKG VENTRICULAR RATE: 104 BPM
EOSINOPHIL # BLD: 0 K/UL (ref 0–0.6)
EOSINOPHIL NFR BLD: 0 %
ERYTHROCYTE [SEDIMENTATION RATE] IN BLOOD BY WESTERGREN METHOD: 27 MM/HR (ref 0–30)
GFR SERPLBLD CREATININE-BSD FMLA CKD-EPI: >90 ML/MIN/{1.73_M2}
GLUCOSE SERPL-MCNC: 137 MG/DL (ref 70–99)
HCT VFR BLD AUTO: 34 % (ref 36–48)
HGB BLD-MCNC: 11.2 G/DL (ref 12–16)
INR PPP: 1.17 (ref 0.85–1.15)
LACTATE BLDV-SCNC: 0.9 MMOL/L (ref 0.4–2)
LYMPHOCYTES # BLD: 0.6 K/UL (ref 1–5.1)
LYMPHOCYTES NFR BLD: 1.8 %
MCH RBC QN AUTO: 28.1 PG (ref 26–34)
MCHC RBC AUTO-ENTMCNC: 33 G/DL (ref 31–36)
MCV RBC AUTO: 85.1 FL (ref 80–100)
MONOCYTES # BLD: 0.3 K/UL (ref 0–1.3)
MONOCYTES NFR BLD: 0.7 %
NEUTROPHILS # BLD: 35.4 K/UL (ref 1.7–7.7)
NEUTROPHILS NFR BLD: 97.4 %
PLATELET # BLD AUTO: 168 K/UL (ref 135–450)
PMV BLD AUTO: 8.6 FL (ref 5–10.5)
POTASSIUM SERPL-SCNC: 4.1 MMOL/L (ref 3.5–5.1)
PROCALCITONIN SERPL IA-MCNC: 1.31 NG/ML (ref 0–0.15)
PROT SERPL-MCNC: 6.1 G/DL (ref 6.4–8.2)
PROTHROMBIN TIME: 15.1 SEC (ref 11.9–14.9)
RBC # BLD AUTO: 3.99 M/UL (ref 4–5.2)
SODIUM SERPL-SCNC: 132 MMOL/L (ref 136–145)
WBC # BLD AUTO: 36.4 K/UL (ref 4–11)

## 2024-10-23 PROCEDURE — 93010 ELECTROCARDIOGRAM REPORT: CPT | Performed by: INTERNAL MEDICINE

## 2024-10-23 PROCEDURE — 2580000003 HC RX 258: Performed by: SURGERY

## 2024-10-23 PROCEDURE — 99222 1ST HOSP IP/OBS MODERATE 55: CPT | Performed by: SURGERY

## 2024-10-23 PROCEDURE — APPNB30 APP NON BILLABLE TIME 0-30 MINS: Performed by: NURSE PRACTITIONER

## 2024-10-23 PROCEDURE — 84145 PROCALCITONIN (PCT): CPT

## 2024-10-23 PROCEDURE — 1200000000 HC SEMI PRIVATE

## 2024-10-23 PROCEDURE — 71045 X-RAY EXAM CHEST 1 VIEW: CPT

## 2024-10-23 PROCEDURE — 6360000002 HC RX W HCPCS: Performed by: NURSE PRACTITIONER

## 2024-10-23 PROCEDURE — 80053 COMPREHEN METABOLIC PANEL: CPT

## 2024-10-23 PROCEDURE — 83605 ASSAY OF LACTIC ACID: CPT

## 2024-10-23 PROCEDURE — 85610 PROTHROMBIN TIME: CPT

## 2024-10-23 PROCEDURE — 99223 1ST HOSP IP/OBS HIGH 75: CPT | Performed by: INTERNAL MEDICINE

## 2024-10-23 PROCEDURE — 85652 RBC SED RATE AUTOMATED: CPT

## 2024-10-23 PROCEDURE — 85025 COMPLETE CBC W/AUTO DIFF WBC: CPT

## 2024-10-23 PROCEDURE — 2580000003 HC RX 258: Performed by: NURSE PRACTITIONER

## 2024-10-23 PROCEDURE — 6360000002 HC RX W HCPCS: Performed by: STUDENT IN AN ORGANIZED HEALTH CARE EDUCATION/TRAINING PROGRAM

## 2024-10-23 PROCEDURE — 93005 ELECTROCARDIOGRAM TRACING: CPT | Performed by: INTERNAL MEDICINE

## 2024-10-23 PROCEDURE — 6370000000 HC RX 637 (ALT 250 FOR IP): Performed by: NURSE PRACTITIONER

## 2024-10-23 PROCEDURE — 2580000003 HC RX 258: Performed by: STUDENT IN AN ORGANIZED HEALTH CARE EDUCATION/TRAINING PROGRAM

## 2024-10-23 PROCEDURE — 6360000002 HC RX W HCPCS: Performed by: SURGERY

## 2024-10-23 PROCEDURE — 86140 C-REACTIVE PROTEIN: CPT

## 2024-10-23 PROCEDURE — 36415 COLL VENOUS BLD VENIPUNCTURE: CPT

## 2024-10-23 RX ORDER — 0.9 % SODIUM CHLORIDE 0.9 %
1000 INTRAVENOUS SOLUTION INTRAVENOUS ONCE
Status: COMPLETED | OUTPATIENT
Start: 2024-10-23 | End: 2024-10-24

## 2024-10-23 RX ORDER — FLUCONAZOLE 2 MG/ML
400 INJECTION, SOLUTION INTRAVENOUS EVERY 24 HOURS
Status: DISCONTINUED | OUTPATIENT
Start: 2024-10-23 | End: 2024-11-04

## 2024-10-23 RX ADMIN — FLUCONAZOLE 400 MG: 400 INJECTION, SOLUTION INTRAVENOUS at 16:03

## 2024-10-23 RX ADMIN — MEROPENEM 1000 MG: 1 INJECTION INTRAVENOUS at 18:04

## 2024-10-23 RX ADMIN — ACETAMINOPHEN 650 MG: 325 TABLET ORAL at 15:54

## 2024-10-23 RX ADMIN — CEFEPIME 2000 MG: 2 INJECTION, POWDER, FOR SOLUTION INTRAVENOUS at 03:53

## 2024-10-23 RX ADMIN — MORPHINE SULFATE 2 MG: 2 INJECTION, SOLUTION INTRAMUSCULAR; INTRAVENOUS at 15:11

## 2024-10-23 RX ADMIN — MORPHINE SULFATE 2 MG: 2 INJECTION, SOLUTION INTRAMUSCULAR; INTRAVENOUS at 03:47

## 2024-10-23 RX ADMIN — ENOXAPARIN SODIUM 30 MG: 100 INJECTION SUBCUTANEOUS at 08:37

## 2024-10-23 RX ADMIN — MORPHINE SULFATE 2 MG: 2 INJECTION, SOLUTION INTRAMUSCULAR; INTRAVENOUS at 22:54

## 2024-10-23 RX ADMIN — VANCOMYCIN HYDROCHLORIDE 750 MG: 750 INJECTION, POWDER, LYOPHILIZED, FOR SOLUTION INTRAVENOUS at 16:02

## 2024-10-23 RX ADMIN — Medication 10 ML: at 08:46

## 2024-10-23 RX ADMIN — MEROPENEM 1000 MG: 1 INJECTION INTRAVENOUS at 08:39

## 2024-10-23 RX ADMIN — METRONIDAZOLE 500 MG: 500 INJECTION, SOLUTION INTRAVENOUS at 06:18

## 2024-10-23 RX ADMIN — MORPHINE SULFATE 2 MG: 2 INJECTION, SOLUTION INTRAMUSCULAR; INTRAVENOUS at 08:37

## 2024-10-23 RX ADMIN — SODIUM CHLORIDE: 9 INJECTION, SOLUTION INTRAVENOUS at 19:53

## 2024-10-23 ASSESSMENT — PAIN SCALES - GENERAL
PAINLEVEL_OUTOF10: 8
PAINLEVEL_OUTOF10: 7
PAINLEVEL_OUTOF10: 8
PAINLEVEL_OUTOF10: 0
PAINLEVEL_OUTOF10: 4
PAINLEVEL_OUTOF10: 7
PAINLEVEL_OUTOF10: 0
PAINLEVEL_OUTOF10: 8
PAINLEVEL_OUTOF10: 8

## 2024-10-23 ASSESSMENT — PAIN DESCRIPTION - DESCRIPTORS
DESCRIPTORS: CRAMPING;DISCOMFORT
DESCRIPTORS: DISCOMFORT;THROBBING
DESCRIPTORS: CRAMPING;DISCOMFORT
DESCRIPTORS: ACHING;DISCOMFORT;THROBBING
DESCRIPTORS: CRAMPING;DISCOMFORT
DESCRIPTORS: THROBBING

## 2024-10-23 ASSESSMENT — PAIN DESCRIPTION - LOCATION
LOCATION: ABDOMEN

## 2024-10-23 ASSESSMENT — PAIN DESCRIPTION - FREQUENCY
FREQUENCY: CONTINUOUS
FREQUENCY: INTERMITTENT
FREQUENCY: CONTINUOUS
FREQUENCY: INTERMITTENT

## 2024-10-23 ASSESSMENT — PAIN DESCRIPTION - ORIENTATION
ORIENTATION: LOWER;LEFT
ORIENTATION: LOWER
ORIENTATION: LOWER;LEFT
ORIENTATION: LOWER;LEFT

## 2024-10-23 NOTE — PLAN OF CARE
Problem: Pain  Goal: Verbalizes/displays adequate comfort level or baseline comfort level  10/23/2024 1339 by Aimee Horne, RN  Outcome: Progressing  10/23/2024 0127 by Brittany Rudd, RN  Outcome: Progressing

## 2024-10-23 NOTE — CONSULTS
[facility] GENERAL AND LAPAROSCOPIC SURGERY                       PATIENT NAME: Stephani Reyes     ADMISSION DATE: 10/22/2024  4:01 PM      TODAY'S DATE: 10/23/2024    Reason for Consult:  Abd pain    Requesting Physician:  Dr. FRANCISCO Rios    HISTORY OF PRESENT ILLNESS:              The patient is a 53 y.o. female who presents with Abd pain. Sharp, severe, lower abd, less in upper abdomen. More with pressure. Noted 1 - 2 days.   Pt has had nausea, no emesis. Last BM a day ago.  No bleeding noted. Has just had chemo for GYN endometrioid ovarian cancer.   Pt had lap mike by myself in 6/2024. Intraabd tumor / GYN malignancy noted. Pt had Robot hysterectomy - cancer operation per Dr. Ogden at [facility] 7/2024. Post op pt had infection with intraabdominal abscess. Had flex sig with large ulcerated defect in rectum noted with visible suture material. IR Drain placed - removed, chemo started, has a port in place.  Pt admitted and on antibiotics at this time.    Past Medical History:        Diagnosis Date    Chronic chest pain 01/24/2024    Due to chronic pleural disease.  Was referred to pain clinic.    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 Ren Yee    HYSTERECTOMY, TOTAL ABDOMINAL (CERVIX REMOVED)  07/22/2024    DIAGNOSTIC LAPAROSCOPY, ROBOT TOTAL HYSTERECTOMY, BILATERAL SALPINGO-OOPHORECTOMY, HAND ASSISTED EXTRACTION by Dr. Courtney Ogden    LUNG SURGERY  08/21/2023    s/p Robotic evacuation of pleural effusion, pleural

## 2024-10-23 NOTE — ED NOTES
How does patient ambulate?   [x]Low Fall Risk (ambulates by themselves without support)  []Stand by assist   []Contact Guard   []Front wheel walker  []Wheelchair   []Steady  []Bed bound  []History of Lower Extremity Amputation  []Unknown, did not assess in the emergency department   How does patient take pills?  [x]Whole with Water  []Crushed in applesauce  []Crushed in pudding  []Other  []Unknown no oral medications were given in the ED  Is patient alert?   [x]Alert  []Drowsy but responds to voice  []Doesn't respond to voice but responds to painful stimuli  []Unresponsive  Is patient oriented?   [x]To person  [x]To place  [x]To time  [x]To situation  []Confused  []Agitated  []Follows commands  If patient is disoriented or from a Skill Nursing Facility has family been notified of admission?     Patient belongings?   [x]Cell phone  [x]Wallet   []Dentures  [x]Clothing  Any specific patient or family belongings/needs/dynamics?     Miscellaneous comments/pending orders?       If there are any additional questions please reach out to the Emergency Department.

## 2024-10-23 NOTE — H&P
V2.0  History and Physical      Name:  Stephani Reyes /Age/Sex: 1970  (53 y.o. female)   MRN & CSN:  [id number] & [id number] Encounter Date/Time: 10/22/2024 8:32 PM EDT   Location:  [facility] PCP: Reyes, Eleia J, MD       Hospital Day: 1    Assessment and Plan:   Stephani Reyes is a 53 y.o. female  who presents with Sepsis (HCC)    Hospital Problems             Last Modified POA    * (Principal) Sepsis (HCC) 10/22/2024 Yes       Plan:  Sepsis secondary to Multiple Abdominal Abscesses  Admit inpatient with telemetry  , Lactic acid 2.6, WBC 20.7   BP stable   NPO  IV fluids x 1 liter secondary to IV shortage  IV pain medications  Surgery consulted by ER  IV antibiotics  Blood cx    2. Stage IIA Endometrioid Ovarian Cancer S/P RATH, BSO  Follows with [facility]  Receiving chemotherapy  Consult Oncology    3. Hx Recurrent Pleural Effusions  Denies any shortness of breath   CXR in am     Disposition:   Current Living situation: home  Expected Disposition: home  Estimated D/C: 4    Diet No diet orders on file   DVT Prophylaxis [x] Lovenox, []  Heparin, [] SCDs, [] Ambulation,  [] Eliquis, [] Xarelto, [] Coumadin   Code Status Prior   Surrogate Decision Maker/ POA      Personally reviewed Lab Studies and Imaging     Discussed management of the case with Genet Rios MD in ER who recommended admission     Drugs that require monitoring for toxicity include morphine and the method of monitoring was pain scale        History from:     patient, family member -     History of Present Illness:     Chief Complaint: abdominal pain   Stephani Reyes is a 53 y.o. female with pmh of stage IIa endometrioid ovarian cancer, recurrent pleural effusions who presents with abdominal pain.  Patient developed acute lower abdominal pain 10 out of 10 last evening around 9:00pm.  She denies any nausea or vomiting.  She had a regular bowel movement this morning.  She has been having less frequent bowel movements since

## 2024-10-23 NOTE — CONSULTS
Infectious Diseases   Consult Note        Admission Date: 10/22/2024  Hospital Day: Hospital Day: 2   Attending: Doroteo Steele MD  Date of service: 10/23/24     Reason for admission: Septicemia (HCC) [A41.9]  Intra-abdominal abscess (HCC) [K65.1]  Immunocompromised (HCC) [D84.9]  Sepsis (HCC) [A41.9]    Chief complaint/ Reason for consult: Sepsis, multiple intra-abdominal abscesses    Microbiology:        I have reviewed allavailable micro lab data and cultures    Results       Procedure Component Value Units Date/Time    Blood Culture 2 [[id number]] Collected: 10/22/24 1723    Order Status: Sent Specimen: Blood Updated: 10/22/24 2100    Blood Culture 1 [[id number]] Collected: 10/22/24 1723    Order Status: Sent Specimen: Blood Updated: 10/22/24 2100             No results found for the last 90 days.     ROUTINE CULT, ASPIRATE +GS  Specimen: Aspirate - Abdominal Abscess  Component 2 mo ago   Gram Stain Many Polymorphonuclear Leukocytes Seen .No Squamous Epithelial Cells Seen .No Organisms Seen .   Culture Result: Heavy Growth : High    Ampicillin <=2 Sensitive (S)   Ampicillin/Sulbactam <=2 Sensitive (S)   Amoxicillin/Clavulanic Acid <=2 Sensitive (S)   Cefazolin 2 Sensitive (S)   Cefepime <=0.12 Sensitive (S)   Cefotaxime <=0.25 Sensitive (S)   Ceftriaxone <=0.25 Sensitive (S)   Ertapenem <=0.12 Sensitive (S)   Gentamicin <=1 Sensitive (S)   Levofloxacin <=0.12 Sensitive (S)   Meropenem <=0.25 Sensitive (S)   Piperacillin/Tazobactam <=4 Sensitive (S)   Sulfa/Trimethoprim <=20 Sensitive (S)   Culture Result: Escherichia coli : High    Organism 2 Heavy Growth : High    Ampicillin >=32 Resistant (R)   Ampicillin/Sulbactam 4 Sensitive (S)   Cefazolin 2 Sensitive (S)   Cefepime <=0.12 Sensitive (S)   Cefotaxime <=0.25 Sensitive (S)   Ceftriaxone <=0.25 Sensitive (S)   Ertapenem <=0.12 Sensitive (S)   Gentamicin <=1 Sensitive (S)   Levofloxacin 0.25 Sensitive (S)   Meropenem <=0.25 Sensitive (S)

## 2024-10-24 LAB
ALBUMIN SERPL-MCNC: 2.5 G/DL (ref 3.4–5)
ALBUMIN/GLOB SERPL: 1 {RATIO} (ref 1.1–2.2)
ALP SERPL-CCNC: 60 U/L (ref 40–129)
ALT SERPL-CCNC: 8 U/L (ref 10–40)
ANION GAP SERPL CALCULATED.3IONS-SCNC: 9 MMOL/L (ref 3–16)
AST SERPL-CCNC: 12 U/L (ref 15–37)
BILIRUB SERPL-MCNC: 0.4 MG/DL (ref 0–1)
BUN SERPL-MCNC: 11 MG/DL (ref 7–20)
CALCIUM SERPL-MCNC: 7.4 MG/DL (ref 8.3–10.6)
CHLORIDE SERPL-SCNC: 105 MMOL/L (ref 99–110)
CO2 SERPL-SCNC: 21 MMOL/L (ref 21–32)
CREAT SERPL-MCNC: <0.5 MG/DL (ref 0.6–1.1)
DEPRECATED RDW RBC AUTO: 16 % (ref 12.4–15.4)
GFR SERPLBLD CREATININE-BSD FMLA CKD-EPI: >90 ML/MIN/{1.73_M2}
GLUCOSE SERPL-MCNC: 102 MG/DL (ref 70–99)
HCT VFR BLD AUTO: 26.4 % (ref 36–48)
HGB BLD-MCNC: 8.8 G/DL (ref 12–16)
MAGNESIUM SERPL-MCNC: 1.74 MG/DL (ref 1.8–2.4)
MCH RBC QN AUTO: 28 PG (ref 26–34)
MCHC RBC AUTO-ENTMCNC: 33.1 G/DL (ref 31–36)
MCV RBC AUTO: 84.5 FL (ref 80–100)
PLATELET # BLD AUTO: 132 K/UL (ref 135–450)
PMV BLD AUTO: 8.4 FL (ref 5–10.5)
POTASSIUM SERPL-SCNC: 3.3 MMOL/L (ref 3.5–5.1)
PROT SERPL-MCNC: 5.1 G/DL (ref 6.4–8.2)
RBC # BLD AUTO: 3.13 M/UL (ref 4–5.2)
SODIUM SERPL-SCNC: 135 MMOL/L (ref 136–145)
VANCOMYCIN SERPL-MCNC: 10.4 UG/ML
WBC # BLD AUTO: 27.6 K/UL (ref 4–11)

## 2024-10-24 PROCEDURE — 2580000003 HC RX 258: Performed by: NURSE PRACTITIONER

## 2024-10-24 PROCEDURE — 6360000002 HC RX W HCPCS: Performed by: SURGERY

## 2024-10-24 PROCEDURE — 80053 COMPREHEN METABOLIC PANEL: CPT

## 2024-10-24 PROCEDURE — 85027 COMPLETE CBC AUTOMATED: CPT

## 2024-10-24 PROCEDURE — 6360000002 HC RX W HCPCS: Performed by: NURSE PRACTITIONER

## 2024-10-24 PROCEDURE — 99233 SBSQ HOSP IP/OBS HIGH 50: CPT | Performed by: INTERNAL MEDICINE

## 2024-10-24 PROCEDURE — 99232 SBSQ HOSP IP/OBS MODERATE 35: CPT | Performed by: SURGERY

## 2024-10-24 PROCEDURE — 2580000003 HC RX 258: Performed by: STUDENT IN AN ORGANIZED HEALTH CARE EDUCATION/TRAINING PROGRAM

## 2024-10-24 PROCEDURE — 6370000000 HC RX 637 (ALT 250 FOR IP): Performed by: NURSE PRACTITIONER

## 2024-10-24 PROCEDURE — 83735 ASSAY OF MAGNESIUM: CPT

## 2024-10-24 PROCEDURE — 6360000002 HC RX W HCPCS: Performed by: STUDENT IN AN ORGANIZED HEALTH CARE EDUCATION/TRAINING PROGRAM

## 2024-10-24 PROCEDURE — 80202 ASSAY OF VANCOMYCIN: CPT

## 2024-10-24 PROCEDURE — 6370000000 HC RX 637 (ALT 250 FOR IP): Performed by: STUDENT IN AN ORGANIZED HEALTH CARE EDUCATION/TRAINING PROGRAM

## 2024-10-24 PROCEDURE — 1200000000 HC SEMI PRIVATE

## 2024-10-24 PROCEDURE — 2580000003 HC RX 258: Performed by: SURGERY

## 2024-10-24 RX ORDER — MAGNESIUM SULFATE 1 G/100ML
1000 INJECTION INTRAVENOUS ONCE
Status: COMPLETED | OUTPATIENT
Start: 2024-10-24 | End: 2024-10-24

## 2024-10-24 RX ORDER — DEXTROSE MONOHYDRATE 100 MG/ML
INJECTION, SOLUTION INTRAVENOUS CONTINUOUS PRN
Status: DISCONTINUED | OUTPATIENT
Start: 2024-10-24 | End: 2024-11-10 | Stop reason: HOSPADM

## 2024-10-24 RX ORDER — LACTOBACILLUS RHAMNOSUS GG 10B CELL
1 CAPSULE ORAL 2 TIMES DAILY WITH MEALS
Status: DISCONTINUED | OUTPATIENT
Start: 2024-10-24 | End: 2024-11-10 | Stop reason: HOSPADM

## 2024-10-24 RX ORDER — GLUCAGON 1 MG/ML
1 KIT INJECTION PRN
Status: DISCONTINUED | OUTPATIENT
Start: 2024-10-24 | End: 2024-11-10 | Stop reason: HOSPADM

## 2024-10-24 RX ADMIN — POTASSIUM CHLORIDE 40 MEQ: 1500 TABLET, EXTENDED RELEASE ORAL at 07:00

## 2024-10-24 RX ADMIN — Medication 10 ML: at 09:34

## 2024-10-24 RX ADMIN — ACETAMINOPHEN 650 MG: 325 TABLET ORAL at 21:08

## 2024-10-24 RX ADMIN — MEROPENEM 1000 MG: 1 INJECTION INTRAVENOUS at 17:45

## 2024-10-24 RX ADMIN — MEROPENEM 1000 MG: 1 INJECTION INTRAVENOUS at 09:40

## 2024-10-24 RX ADMIN — VANCOMYCIN HYDROCHLORIDE 750 MG: 750 INJECTION, POWDER, LYOPHILIZED, FOR SOLUTION INTRAVENOUS at 17:44

## 2024-10-24 RX ADMIN — MELATONIN TAB 3 MG 3 MG: 3 TAB at 01:05

## 2024-10-24 RX ADMIN — MORPHINE SULFATE 2 MG: 2 INJECTION, SOLUTION INTRAMUSCULAR; INTRAVENOUS at 15:35

## 2024-10-24 RX ADMIN — MAGNESIUM SULFATE HEPTAHYDRATE 1000 MG: 1 INJECTION, SOLUTION INTRAVENOUS at 12:58

## 2024-10-24 RX ADMIN — MEROPENEM 1000 MG: 1 INJECTION INTRAVENOUS at 01:03

## 2024-10-24 RX ADMIN — Medication 10 ML: at 20:57

## 2024-10-24 RX ADMIN — SODIUM CHLORIDE: 9 INJECTION, SOLUTION INTRAVENOUS at 06:30

## 2024-10-24 RX ADMIN — VANCOMYCIN HYDROCHLORIDE 750 MG: 750 INJECTION, POWDER, LYOPHILIZED, FOR SOLUTION INTRAVENOUS at 23:40

## 2024-10-24 RX ADMIN — MORPHINE SULFATE 2 MG: 2 INJECTION, SOLUTION INTRAMUSCULAR; INTRAVENOUS at 09:33

## 2024-10-24 RX ADMIN — ACETAMINOPHEN 650 MG: 325 TABLET ORAL at 06:22

## 2024-10-24 RX ADMIN — MORPHINE SULFATE 2 MG: 2 INJECTION, SOLUTION INTRAMUSCULAR; INTRAVENOUS at 20:58

## 2024-10-24 RX ADMIN — SODIUM CHLORIDE 1000 ML: 0.9 INJECTION, SOLUTION INTRAVENOUS at 16:59

## 2024-10-24 RX ADMIN — FLUCONAZOLE 400 MG: 400 INJECTION, SOLUTION INTRAVENOUS at 15:24

## 2024-10-24 RX ADMIN — VANCOMYCIN HYDROCHLORIDE 750 MG: 750 INJECTION, POWDER, LYOPHILIZED, FOR SOLUTION INTRAVENOUS at 01:08

## 2024-10-24 RX ADMIN — Medication 1 CAPSULE: at 18:15

## 2024-10-24 RX ADMIN — MELATONIN TAB 3 MG 3 MG: 3 TAB at 21:08

## 2024-10-24 RX ADMIN — MORPHINE SULFATE 2 MG: 2 INJECTION, SOLUTION INTRAMUSCULAR; INTRAVENOUS at 03:27

## 2024-10-24 ASSESSMENT — PAIN SCALES - GENERAL
PAINLEVEL_OUTOF10: 8
PAINLEVEL_OUTOF10: 7
PAINLEVEL_OUTOF10: 8
PAINLEVEL_OUTOF10: 1
PAINLEVEL_OUTOF10: 5
PAINLEVEL_OUTOF10: 4
PAINLEVEL_OUTOF10: 8

## 2024-10-24 ASSESSMENT — PAIN DESCRIPTION - ORIENTATION
ORIENTATION: LEFT;LOWER
ORIENTATION: RIGHT;LEFT
ORIENTATION: LEFT;LOWER
ORIENTATION: LEFT;LOWER
ORIENTATION: RIGHT;LEFT

## 2024-10-24 ASSESSMENT — PAIN DESCRIPTION - LOCATION
LOCATION: ABDOMEN
LOCATION: ABDOMEN
LOCATION: LEG
LOCATION: LEG
LOCATION: ABDOMEN

## 2024-10-24 ASSESSMENT — PAIN DESCRIPTION - DESCRIPTORS
DESCRIPTORS: ACHING;CRAMPING;DISCOMFORT
DESCRIPTORS: ACHING;CRAMPING;DISCOMFORT
DESCRIPTORS: CRAMPING;DISCOMFORT

## 2024-10-24 ASSESSMENT — PAIN DESCRIPTION - FREQUENCY: FREQUENCY: CONTINUOUS

## 2024-10-24 NOTE — CONSULTS
ONCOLOGY HEMATOLOGY CARE PROGRESS NOTE      SUBJECTIVE:    The patient known to me from the office  She has endometrioid carcinoma of the right ovary.  She underwent diagnostic laparoscopy and computer assisted total hysterectomy, bilateral salpingo-oophorectomy and extraction of surgical specimen.  This was staged as pT2a pN0 FIGO stage IIa.  Adjuvant chemotherapy with carboplatin and Taxol was started in September 2024.  On 10/21/2024 she received second cycle of carboplatin, paclitaxel and she received pegfilgrastim on 10/22/2024  She was sent to the emergency room from the office due to sudden onset lower abdominal pain  CT abdomen and pelvis had shown multiple intra-abdominal abscesses.  She is also febrile in the hospital    IV meropenem, vancomycin and Diflucan have been started.    ROS:       OBJECTIVE        Physical    VITALS:  Patient Vitals for the past 24 hrs:   BP Temp Temp src Pulse Resp SpO2 Height Weight   10/23/24 1945 100/62 99.9 °F (37.7 °C) Oral 90 16 95 % -- --   10/23/24 1800 98/63 99.3 °F (37.4 °C) Oral 99 -- 96 % -- --   10/23/24 1545 117/72 (!) 102.6 °F (39.2 °C) Oral (!) 101 16 96 % -- --   10/23/24 1511 -- -- -- -- 16 -- -- --   10/23/24 1245 102/64 99.7 °F (37.6 °C) Oral 98 16 97 % -- --   10/23/24 1203 108/68 99 °F (37.2 °C) Oral 100 16 97 % -- --   10/23/24 0907 -- -- -- -- 16 -- -- --   10/23/24 0837 -- -- -- -- 16 -- -- --   10/23/24 0815 110/69 100.4 °F (38 °C) Oral (!) 102 16 -- -- --   10/23/24 0745 111/72 98 °F (36.7 °C) Oral (!) 106 18 96 % -- --   10/23/24 0417 -- -- -- -- 16 -- -- --   10/23/24 0342 120/77 99.4 °F (37.4 °C) Oral (!) 109 18 97 % -- --   10/23/24 0028 100/66 (!) 100.7 °F (38.2 °C) Oral (!) 105 18 96 % -- --   10/22/24 2130 -- -- -- -- -- -- 1.448 m (4' 9\") 43.4 kg (95 lb 9.6 oz)   10/22/24 2115 116/77 (!) 101.5 °F (38.6 °C) Oral (!) 109 16 96 % -- --       24HR INTAKE/OUTPUT:    Intake/Output Summary (Last 24 hours) at

## 2024-10-24 NOTE — ACP (ADVANCE CARE PLANNING)
Advanced Care Planning Note    Purpose of Encounter: Advanced care planning in light of abscess  Parties In Attendance: Patient, Ravin Rosales MD, Huber  Decisional Capacity: Yes  Subjective: Patient has abdominal pain  Objective: WBC 20.7  Goals of Care Determination: Patient wants full support  Plan:  IV abx, Gen Sx and ID recs  Code Status: Full   Time spent on Advanced care Plannin minutes  Advanced Care Planning Documents: Completed advanced directives on chart, Huber, is the Healthcare POA.    Ravin Rosales MD  10/24/2024 11:56 AM

## 2024-10-25 ENCOUNTER — APPOINTMENT (OUTPATIENT)
Dept: GENERAL RADIOLOGY | Age: 54
DRG: 853 | End: 2024-10-25
Payer: COMMERCIAL

## 2024-10-25 LAB
ALBUMIN SERPL-MCNC: 3.2 G/DL (ref 3.4–5)
ALBUMIN/GLOB SERPL: 1.1 {RATIO} (ref 1.1–2.2)
ALP SERPL-CCNC: 105 U/L (ref 40–129)
ALT SERPL-CCNC: 8 U/L (ref 10–40)
ANION GAP SERPL CALCULATED.3IONS-SCNC: 12 MMOL/L (ref 3–16)
ANISOCYTOSIS BLD QL SMEAR: ABNORMAL
AST SERPL-CCNC: 18 U/L (ref 15–37)
BASOPHILS # BLD: 0 K/UL (ref 0–0.2)
BASOPHILS NFR BLD: 0 %
BILIRUB SERPL-MCNC: 0.4 MG/DL (ref 0–1)
BUN SERPL-MCNC: 10 MG/DL (ref 7–20)
CALCIUM SERPL-MCNC: 8.3 MG/DL (ref 8.3–10.6)
CHLORIDE SERPL-SCNC: 103 MMOL/L (ref 99–110)
CO2 SERPL-SCNC: 19 MMOL/L (ref 21–32)
CREAT SERPL-MCNC: <0.5 MG/DL (ref 0.6–1.1)
DEPRECATED RDW RBC AUTO: 16.4 % (ref 12.4–15.4)
EOSINOPHIL # BLD: 0.5 K/UL (ref 0–0.6)
EOSINOPHIL NFR BLD: 2 %
GFR SERPLBLD CREATININE-BSD FMLA CKD-EPI: >90 ML/MIN/{1.73_M2}
GLUCOSE BLD-MCNC: 126 MG/DL (ref 70–99)
GLUCOSE SERPL-MCNC: 85 MG/DL (ref 70–99)
HCT VFR BLD AUTO: 30.3 % (ref 36–48)
HGB BLD-MCNC: 9.8 G/DL (ref 12–16)
LYMPHOCYTES # BLD: 0.3 K/UL (ref 1–5.1)
LYMPHOCYTES NFR BLD: 1 %
MAGNESIUM SERPL-MCNC: 1.92 MG/DL (ref 1.8–2.4)
MCH RBC QN AUTO: 27.6 PG (ref 26–34)
MCHC RBC AUTO-ENTMCNC: 32.3 G/DL (ref 31–36)
MCV RBC AUTO: 85.5 FL (ref 80–100)
MONOCYTES # BLD: 0.3 K/UL (ref 0–1.3)
MONOCYTES NFR BLD: 1 %
NEUTROPHILS # BLD: 24.1 K/UL (ref 1.7–7.7)
NEUTROPHILS NFR BLD: 93 %
NEUTS BAND NFR BLD MANUAL: 3 % (ref 0–7)
PERFORMED ON: ABNORMAL
PLATELET # BLD AUTO: 182 K/UL (ref 135–450)
PLATELET BLD QL SMEAR: ADEQUATE
PMV BLD AUTO: 8.7 FL (ref 5–10.5)
POTASSIUM SERPL-SCNC: 3.7 MMOL/L (ref 3.5–5.1)
PROT SERPL-MCNC: 6 G/DL (ref 6.4–8.2)
RBC # BLD AUTO: 3.54 M/UL (ref 4–5.2)
SLIDE REVIEW: ABNORMAL
SODIUM SERPL-SCNC: 134 MMOL/L (ref 136–145)
VANCOMYCIN SERPL-MCNC: 10.9 UG/ML
WBC # BLD AUTO: 25.1 K/UL (ref 4–11)

## 2024-10-25 PROCEDURE — 6360000002 HC RX W HCPCS: Performed by: INTERNAL MEDICINE

## 2024-10-25 PROCEDURE — 80202 ASSAY OF VANCOMYCIN: CPT

## 2024-10-25 PROCEDURE — 6360000002 HC RX W HCPCS: Performed by: NURSE PRACTITIONER

## 2024-10-25 PROCEDURE — 6370000000 HC RX 637 (ALT 250 FOR IP): Performed by: NURSE PRACTITIONER

## 2024-10-25 PROCEDURE — 6360000002 HC RX W HCPCS: Performed by: STUDENT IN AN ORGANIZED HEALTH CARE EDUCATION/TRAINING PROGRAM

## 2024-10-25 PROCEDURE — 1200000000 HC SEMI PRIVATE

## 2024-10-25 PROCEDURE — 2580000003 HC RX 258: Performed by: NURSE PRACTITIONER

## 2024-10-25 PROCEDURE — 6370000000 HC RX 637 (ALT 250 FOR IP): Performed by: INTERNAL MEDICINE

## 2024-10-25 PROCEDURE — 6370000000 HC RX 637 (ALT 250 FOR IP): Performed by: STUDENT IN AN ORGANIZED HEALTH CARE EDUCATION/TRAINING PROGRAM

## 2024-10-25 PROCEDURE — 2580000003 HC RX 258: Performed by: STUDENT IN AN ORGANIZED HEALTH CARE EDUCATION/TRAINING PROGRAM

## 2024-10-25 PROCEDURE — 6360000002 HC RX W HCPCS: Performed by: SURGERY

## 2024-10-25 PROCEDURE — APPNB30 APP NON BILLABLE TIME 0-30 MINS: Performed by: NURSE PRACTITIONER

## 2024-10-25 PROCEDURE — 80053 COMPREHEN METABOLIC PANEL: CPT

## 2024-10-25 PROCEDURE — APPSS15 APP SPLIT SHARED TIME 0-15 MINUTES: Performed by: NURSE PRACTITIONER

## 2024-10-25 PROCEDURE — 74019 RADEX ABDOMEN 2 VIEWS: CPT

## 2024-10-25 PROCEDURE — 99233 SBSQ HOSP IP/OBS HIGH 50: CPT | Performed by: INTERNAL MEDICINE

## 2024-10-25 PROCEDURE — 99232 SBSQ HOSP IP/OBS MODERATE 35: CPT | Performed by: SURGERY

## 2024-10-25 PROCEDURE — 2580000003 HC RX 258: Performed by: SURGERY

## 2024-10-25 PROCEDURE — 2580000003 HC RX 258: Performed by: INTERNAL MEDICINE

## 2024-10-25 PROCEDURE — 85025 COMPLETE CBC W/AUTO DIFF WBC: CPT

## 2024-10-25 PROCEDURE — 83735 ASSAY OF MAGNESIUM: CPT

## 2024-10-25 RX ORDER — GABAPENTIN 100 MG/1
100 CAPSULE ORAL 3 TIMES DAILY
Status: DISCONTINUED | OUTPATIENT
Start: 2024-10-25 | End: 2024-11-10 | Stop reason: HOSPADM

## 2024-10-25 RX ADMIN — MEROPENEM 1000 MG: 1 INJECTION INTRAVENOUS at 00:48

## 2024-10-25 RX ADMIN — MELATONIN TAB 3 MG 3 MG: 3 TAB at 20:57

## 2024-10-25 RX ADMIN — Medication 10 ML: at 09:08

## 2024-10-25 RX ADMIN — Medication 1 CAPSULE: at 16:20

## 2024-10-25 RX ADMIN — SODIUM CHLORIDE: 9 INJECTION, SOLUTION INTRAVENOUS at 08:58

## 2024-10-25 RX ADMIN — Medication 1 CAPSULE: at 08:54

## 2024-10-25 RX ADMIN — MORPHINE SULFATE 2 MG: 2 INJECTION, SOLUTION INTRAMUSCULAR; INTRAVENOUS at 20:46

## 2024-10-25 RX ADMIN — GABAPENTIN 100 MG: 100 CAPSULE ORAL at 20:55

## 2024-10-25 RX ADMIN — MEROPENEM 1000 MG: 1 INJECTION INTRAVENOUS at 09:05

## 2024-10-25 RX ADMIN — VANCOMYCIN HYDROCHLORIDE 750 MG: 750 INJECTION, POWDER, LYOPHILIZED, FOR SOLUTION INTRAVENOUS at 09:01

## 2024-10-25 RX ADMIN — FLUCONAZOLE 400 MG: 400 INJECTION, SOLUTION INTRAVENOUS at 14:38

## 2024-10-25 RX ADMIN — MEROPENEM 1000 MG: 1 INJECTION INTRAVENOUS at 16:23

## 2024-10-25 RX ADMIN — VANCOMYCIN HYDROCHLORIDE 1250 MG: 1.25 INJECTION, POWDER, LYOPHILIZED, FOR SOLUTION INTRAVENOUS at 20:54

## 2024-10-25 RX ADMIN — GABAPENTIN 100 MG: 100 CAPSULE ORAL at 14:34

## 2024-10-25 RX ADMIN — Medication 10 ML: at 21:06

## 2024-10-25 RX ADMIN — MORPHINE SULFATE 2 MG: 2 INJECTION, SOLUTION INTRAMUSCULAR; INTRAVENOUS at 05:15

## 2024-10-25 ASSESSMENT — PAIN DESCRIPTION - ORIENTATION: ORIENTATION: RIGHT;LEFT

## 2024-10-25 ASSESSMENT — PAIN SCALES - GENERAL
PAINLEVEL_OUTOF10: 8
PAINLEVEL_OUTOF10: 6
PAINLEVEL_OUTOF10: 8

## 2024-10-25 ASSESSMENT — PAIN DESCRIPTION - LOCATION
LOCATION: LEG;ABDOMEN
LOCATION: ABDOMEN

## 2024-10-25 NOTE — PLAN OF CARE
Problem: Pain  Goal: Verbalizes/displays adequate comfort level or baseline comfort level  10/25/2024 0852 by Yoli Day RN  Outcome: Progressing  10/24/2024 2005 by Aimee Horne RN  Outcome: Progressing     Problem: Safety - Adult  Goal: Free from fall injury  10/25/2024 0852 by Yoli Day RN  Outcome: Progressing  10/24/2024 2005 by Aimee Horne RN  Outcome: Progressing     Problem: Cardiovascular - Adult  Goal: Absence of cardiac dysrhythmias or at baseline  10/25/2024 0852 by Yoli Day RN  Outcome: Progressing  10/24/2024 2005 by Aimee Horne RN  Outcome: Progressing     Problem: Gastrointestinal - Adult  Goal: Minimal or absence of nausea and vomiting  10/25/2024 0852 by Yoli Day RN  Outcome: Progressing  10/24/2024 2005 by Aimee Horne RN  Outcome: Progressing  Goal: Maintains adequate nutritional intake  10/25/2024 0852 by Yoli Day RN  Outcome: Progressing  10/24/2024 2005 by Aimee Horne RN  Outcome: Progressing

## 2024-10-25 NOTE — PLAN OF CARE
Problem: Pain  Goal: Verbalizes/displays adequate comfort level or baseline comfort level  Outcome: Progressing     Problem: Safety - Adult  Goal: Free from fall injury  Outcome: Progressing     Problem: Cardiovascular - Adult  Goal: Absence of cardiac dysrhythmias or at baseline  Outcome: Progressing     Problem: Gastrointestinal - Adult  Goal: Minimal or absence of nausea and vomiting  Outcome: Progressing  Goal: Maintains adequate nutritional intake  Outcome: Progressing

## 2024-10-26 LAB
ALBUMIN SERPL-MCNC: 2.8 G/DL (ref 3.4–5)
ALBUMIN/GLOB SERPL: 0.9 {RATIO} (ref 1.1–2.2)
ALP SERPL-CCNC: 105 U/L (ref 40–129)
ALT SERPL-CCNC: 9 U/L (ref 10–40)
ANION GAP SERPL CALCULATED.3IONS-SCNC: 8 MMOL/L (ref 3–16)
ANISOCYTOSIS BLD QL SMEAR: ABNORMAL
AST SERPL-CCNC: 16 U/L (ref 15–37)
BACTERIA BLD CULT ORG #2: NORMAL
BACTERIA BLD CULT: NORMAL
BASOPHILS # BLD: 0 K/UL (ref 0–0.2)
BASOPHILS NFR BLD: 0 %
BILIRUB SERPL-MCNC: <0.2 MG/DL (ref 0–1)
BUN SERPL-MCNC: 7 MG/DL (ref 7–20)
CALCIUM SERPL-MCNC: 8.2 MG/DL (ref 8.3–10.6)
CHLORIDE SERPL-SCNC: 99 MMOL/L (ref 99–110)
CO2 SERPL-SCNC: 25 MMOL/L (ref 21–32)
CREAT SERPL-MCNC: <0.5 MG/DL (ref 0.6–1.1)
DEPRECATED RDW RBC AUTO: 16.1 % (ref 12.4–15.4)
EOSINOPHIL # BLD: 0.3 K/UL (ref 0–0.6)
EOSINOPHIL NFR BLD: 2 %
GFR SERPLBLD CREATININE-BSD FMLA CKD-EPI: >90 ML/MIN/{1.73_M2}
GLUCOSE BLD-MCNC: 127 MG/DL (ref 70–99)
GLUCOSE BLD-MCNC: 129 MG/DL (ref 70–99)
GLUCOSE BLD-MCNC: 130 MG/DL (ref 70–99)
GLUCOSE BLD-MCNC: 175 MG/DL (ref 70–99)
GLUCOSE SERPL-MCNC: 130 MG/DL (ref 70–99)
HCT VFR BLD AUTO: 27.7 % (ref 36–48)
HGB BLD-MCNC: 9.4 G/DL (ref 12–16)
LYMPHOCYTES # BLD: 0.5 K/UL (ref 1–5.1)
LYMPHOCYTES NFR BLD: 3 %
MAGNESIUM SERPL-MCNC: 2.14 MG/DL (ref 1.8–2.4)
MCH RBC QN AUTO: 28.2 PG (ref 26–34)
MCHC RBC AUTO-ENTMCNC: 33.8 G/DL (ref 31–36)
MCV RBC AUTO: 83.5 FL (ref 80–100)
MONOCYTES # BLD: 0.2 K/UL (ref 0–1.3)
MONOCYTES NFR BLD: 1 %
NEUTROPHILS # BLD: 14.4 K/UL (ref 1.7–7.7)
NEUTROPHILS NFR BLD: 85 %
NEUTS BAND NFR BLD MANUAL: 9 % (ref 0–7)
PERFORMED ON: ABNORMAL
PLATELET # BLD AUTO: 210 K/UL (ref 135–450)
PMV BLD AUTO: 8 FL (ref 5–10.5)
POTASSIUM SERPL-SCNC: 3.4 MMOL/L (ref 3.5–5.1)
PROT SERPL-MCNC: 5.8 G/DL (ref 6.4–8.2)
RBC # BLD AUTO: 3.32 M/UL (ref 4–5.2)
SODIUM SERPL-SCNC: 132 MMOL/L (ref 136–145)
WBC # BLD AUTO: 15.3 K/UL (ref 4–11)

## 2024-10-26 PROCEDURE — 6370000000 HC RX 637 (ALT 250 FOR IP): Performed by: INTERNAL MEDICINE

## 2024-10-26 PROCEDURE — APPNB30 APP NON BILLABLE TIME 0-30 MINS: Performed by: NURSE PRACTITIONER

## 2024-10-26 PROCEDURE — 6370000000 HC RX 637 (ALT 250 FOR IP): Performed by: NURSE PRACTITIONER

## 2024-10-26 PROCEDURE — 99231 SBSQ HOSP IP/OBS SF/LOW 25: CPT | Performed by: SURGERY

## 2024-10-26 PROCEDURE — 6360000002 HC RX W HCPCS: Performed by: NURSE PRACTITIONER

## 2024-10-26 PROCEDURE — 2580000003 HC RX 258: Performed by: SURGERY

## 2024-10-26 PROCEDURE — 80053 COMPREHEN METABOLIC PANEL: CPT

## 2024-10-26 PROCEDURE — 1200000000 HC SEMI PRIVATE

## 2024-10-26 PROCEDURE — 85025 COMPLETE CBC W/AUTO DIFF WBC: CPT

## 2024-10-26 PROCEDURE — 6370000000 HC RX 637 (ALT 250 FOR IP): Performed by: STUDENT IN AN ORGANIZED HEALTH CARE EDUCATION/TRAINING PROGRAM

## 2024-10-26 PROCEDURE — 6360000002 HC RX W HCPCS: Performed by: SURGERY

## 2024-10-26 PROCEDURE — 2580000003 HC RX 258: Performed by: NURSE PRACTITIONER

## 2024-10-26 PROCEDURE — 83735 ASSAY OF MAGNESIUM: CPT

## 2024-10-26 PROCEDURE — 2580000003 HC RX 258: Performed by: INTERNAL MEDICINE

## 2024-10-26 PROCEDURE — 6360000002 HC RX W HCPCS: Performed by: STUDENT IN AN ORGANIZED HEALTH CARE EDUCATION/TRAINING PROGRAM

## 2024-10-26 PROCEDURE — 6360000002 HC RX W HCPCS: Performed by: INTERNAL MEDICINE

## 2024-10-26 PROCEDURE — APPSS15 APP SPLIT SHARED TIME 0-15 MINUTES: Performed by: NURSE PRACTITIONER

## 2024-10-26 RX ORDER — POTASSIUM CHLORIDE 1500 MG/1
40 TABLET, EXTENDED RELEASE ORAL 2 TIMES DAILY WITH MEALS
Status: COMPLETED | OUTPATIENT
Start: 2024-10-26 | End: 2024-10-26

## 2024-10-26 RX ADMIN — MORPHINE SULFATE 2 MG: 2 INJECTION, SOLUTION INTRAMUSCULAR; INTRAVENOUS at 10:54

## 2024-10-26 RX ADMIN — FLUCONAZOLE 400 MG: 400 INJECTION, SOLUTION INTRAVENOUS at 15:17

## 2024-10-26 RX ADMIN — MORPHINE SULFATE 2 MG: 2 INJECTION, SOLUTION INTRAMUSCULAR; INTRAVENOUS at 04:34

## 2024-10-26 RX ADMIN — MEROPENEM 1000 MG: 1 INJECTION INTRAVENOUS at 17:40

## 2024-10-26 RX ADMIN — GABAPENTIN 100 MG: 100 CAPSULE ORAL at 21:34

## 2024-10-26 RX ADMIN — MORPHINE SULFATE 2 MG: 2 INJECTION, SOLUTION INTRAMUSCULAR; INTRAVENOUS at 01:24

## 2024-10-26 RX ADMIN — VANCOMYCIN HYDROCHLORIDE 1250 MG: 1.25 INJECTION, POWDER, LYOPHILIZED, FOR SOLUTION INTRAVENOUS at 21:33

## 2024-10-26 RX ADMIN — Medication 1 CAPSULE: at 10:58

## 2024-10-26 RX ADMIN — Medication 1 CAPSULE: at 17:41

## 2024-10-26 RX ADMIN — MEROPENEM 1000 MG: 1 INJECTION INTRAVENOUS at 01:26

## 2024-10-26 RX ADMIN — ENOXAPARIN SODIUM 30 MG: 100 INJECTION SUBCUTANEOUS at 10:58

## 2024-10-26 RX ADMIN — Medication 10 ML: at 10:55

## 2024-10-26 RX ADMIN — VANCOMYCIN HYDROCHLORIDE 1250 MG: 1.25 INJECTION, POWDER, LYOPHILIZED, FOR SOLUTION INTRAVENOUS at 10:54

## 2024-10-26 RX ADMIN — MEROPENEM 1000 MG: 1 INJECTION INTRAVENOUS at 11:04

## 2024-10-26 RX ADMIN — GABAPENTIN 100 MG: 100 CAPSULE ORAL at 10:58

## 2024-10-26 RX ADMIN — MELATONIN TAB 3 MG 3 MG: 3 TAB at 21:36

## 2024-10-26 RX ADMIN — MORPHINE SULFATE 2 MG: 2 INJECTION, SOLUTION INTRAMUSCULAR; INTRAVENOUS at 15:20

## 2024-10-26 RX ADMIN — GABAPENTIN 100 MG: 100 CAPSULE ORAL at 15:14

## 2024-10-26 RX ADMIN — POTASSIUM CHLORIDE 40 MEQ: 1500 TABLET, EXTENDED RELEASE ORAL at 10:57

## 2024-10-26 RX ADMIN — Medication 10 ML: at 21:34

## 2024-10-26 RX ADMIN — POTASSIUM CHLORIDE 40 MEQ: 1500 TABLET, EXTENDED RELEASE ORAL at 17:41

## 2024-10-26 ASSESSMENT — PAIN DESCRIPTION - ORIENTATION
ORIENTATION: LOWER;LEFT
ORIENTATION: RIGHT;LEFT

## 2024-10-26 ASSESSMENT — PAIN SCALES - GENERAL
PAINLEVEL_OUTOF10: 6
PAINLEVEL_OUTOF10: 7
PAINLEVEL_OUTOF10: 0
PAINLEVEL_OUTOF10: 9
PAINLEVEL_OUTOF10: 2
PAINLEVEL_OUTOF10: 2
PAINLEVEL_OUTOF10: 8
PAINLEVEL_OUTOF10: 6
PAINLEVEL_OUTOF10: 4

## 2024-10-26 ASSESSMENT — PAIN DESCRIPTION - LOCATION
LOCATION: ABDOMEN
LOCATION: ABDOMEN;PELVIS
LOCATION: ABDOMEN

## 2024-10-26 ASSESSMENT — PAIN DESCRIPTION - FREQUENCY: FREQUENCY: CONTINUOUS

## 2024-10-26 ASSESSMENT — PAIN DESCRIPTION - DESCRIPTORS
DESCRIPTORS: ACHING;SHARP
DESCRIPTORS: CRAMPING;DISCOMFORT

## 2024-10-26 NOTE — ACP (ADVANCE CARE PLANNING)
Advanced Care Planning Note.    Purpose of Encounter: Advanced care planning in light of ovarian cancer  Parties In Attendance: Patient  Decisional Capacity: Yes  Subjective: Patient with abdominal pain  Objective:  Cr < 0.5  Goals of Care Determination: Patient wants full support (CPR, vent, surgery, HD, trach, PEG)  Plan:  IV Abx, Surgery/Onc/ID consults  Code Status: Full code   Time spent on Advanced care Plannin minutes  Advanced Care Planning Documents: Completed advanced directives on chart,  is the POA.    Syed Morrison MD  10/26/2024 4:36 PM

## 2024-10-26 NOTE — PLAN OF CARE
Problem: Pain  Goal: Verbalizes/displays adequate comfort level or baseline comfort level  Outcome: Progressing     Problem: Safety - Adult  Goal: Free from fall injury  10/26/2024 1826 by Kristin Morris, RN  Outcome: Progressing  10/26/2024 0622 by Grace Huang, RN  Outcome: Progressing     Problem: Cardiovascular - Adult  Goal: Absence of cardiac dysrhythmias or at baseline  Outcome: Progressing     Problem: Gastrointestinal - Adult  Goal: Minimal or absence of nausea and vomiting  Outcome: Progressing  Goal: Maintains adequate nutritional intake  Outcome: Progressing

## 2024-10-27 ENCOUNTER — APPOINTMENT (OUTPATIENT)
Dept: CT IMAGING | Age: 54
DRG: 853 | End: 2024-10-27
Payer: COMMERCIAL

## 2024-10-27 LAB
ALBUMIN SERPL-MCNC: 3.1 G/DL (ref 3.4–5)
ALBUMIN/GLOB SERPL: 1.1 {RATIO} (ref 1.1–2.2)
ALP SERPL-CCNC: 94 U/L (ref 40–129)
ALT SERPL-CCNC: 8 U/L (ref 10–40)
ANION GAP SERPL CALCULATED.3IONS-SCNC: 8 MMOL/L (ref 3–16)
ANISOCYTOSIS BLD QL SMEAR: ABNORMAL
AST SERPL-CCNC: 14 U/L (ref 15–37)
BASOPHILS # BLD: 0 K/UL (ref 0–0.2)
BASOPHILS NFR BLD: 0 %
BILIRUB SERPL-MCNC: <0.2 MG/DL (ref 0–1)
BUN SERPL-MCNC: 7 MG/DL (ref 7–20)
CALCIUM SERPL-MCNC: 8.6 MG/DL (ref 8.3–10.6)
CHLORIDE SERPL-SCNC: 100 MMOL/L (ref 99–110)
CO2 SERPL-SCNC: 26 MMOL/L (ref 21–32)
CREAT SERPL-MCNC: <0.5 MG/DL (ref 0.6–1.1)
DEPRECATED RDW RBC AUTO: 16.9 % (ref 12.4–15.4)
EOSINOPHIL # BLD: 0.2 K/UL (ref 0–0.6)
EOSINOPHIL NFR BLD: 2 %
GFR SERPLBLD CREATININE-BSD FMLA CKD-EPI: >90 ML/MIN/{1.73_M2}
GLUCOSE BLD-MCNC: 103 MG/DL (ref 70–99)
GLUCOSE BLD-MCNC: 110 MG/DL (ref 70–99)
GLUCOSE BLD-MCNC: 110 MG/DL (ref 70–99)
GLUCOSE BLD-MCNC: 136 MG/DL (ref 70–99)
GLUCOSE SERPL-MCNC: 128 MG/DL (ref 70–99)
HCT VFR BLD AUTO: 28.8 % (ref 36–48)
HGB BLD-MCNC: 9.5 G/DL (ref 12–16)
LYMPHOCYTES # BLD: 0.2 K/UL (ref 1–5.1)
LYMPHOCYTES NFR BLD: 2 %
MAGNESIUM SERPL-MCNC: 1.93 MG/DL (ref 1.8–2.4)
MCH RBC QN AUTO: 27.9 PG (ref 26–34)
MCHC RBC AUTO-ENTMCNC: 33.1 G/DL (ref 31–36)
MCV RBC AUTO: 84.4 FL (ref 80–100)
MONOCYTES # BLD: 0.7 K/UL (ref 0–1.3)
MONOCYTES NFR BLD: 7 %
NEUTROPHILS # BLD: 9.5 K/UL (ref 1.7–7.7)
NEUTROPHILS NFR BLD: 81 %
NEUTS BAND NFR BLD MANUAL: 8 % (ref 0–7)
PERFORMED ON: ABNORMAL
PLATELET # BLD AUTO: 239 K/UL (ref 135–450)
PLATELET BLD QL SMEAR: ADEQUATE
PMV BLD AUTO: 8.1 FL (ref 5–10.5)
POTASSIUM SERPL-SCNC: 4.1 MMOL/L (ref 3.5–5.1)
PROT SERPL-MCNC: 5.9 G/DL (ref 6.4–8.2)
RBC # BLD AUTO: 3.41 M/UL (ref 4–5.2)
SLIDE REVIEW: ABNORMAL
SODIUM SERPL-SCNC: 134 MMOL/L (ref 136–145)
WBC # BLD AUTO: 10.7 K/UL (ref 4–11)

## 2024-10-27 PROCEDURE — 6360000002 HC RX W HCPCS: Performed by: SURGERY

## 2024-10-27 PROCEDURE — 2580000003 HC RX 258: Performed by: NURSE PRACTITIONER

## 2024-10-27 PROCEDURE — 85025 COMPLETE CBC W/AUTO DIFF WBC: CPT

## 2024-10-27 PROCEDURE — 6360000002 HC RX W HCPCS: Performed by: STUDENT IN AN ORGANIZED HEALTH CARE EDUCATION/TRAINING PROGRAM

## 2024-10-27 PROCEDURE — 80053 COMPREHEN METABOLIC PANEL: CPT

## 2024-10-27 PROCEDURE — 2580000003 HC RX 258: Performed by: INTERNAL MEDICINE

## 2024-10-27 PROCEDURE — 99232 SBSQ HOSP IP/OBS MODERATE 35: CPT | Performed by: INTERNAL MEDICINE

## 2024-10-27 PROCEDURE — 6360000002 HC RX W HCPCS: Performed by: NURSE PRACTITIONER

## 2024-10-27 PROCEDURE — 99231 SBSQ HOSP IP/OBS SF/LOW 25: CPT | Performed by: SURGERY

## 2024-10-27 PROCEDURE — 6370000000 HC RX 637 (ALT 250 FOR IP): Performed by: STUDENT IN AN ORGANIZED HEALTH CARE EDUCATION/TRAINING PROGRAM

## 2024-10-27 PROCEDURE — 6370000000 HC RX 637 (ALT 250 FOR IP): Performed by: INTERNAL MEDICINE

## 2024-10-27 PROCEDURE — APPNB30 APP NON BILLABLE TIME 0-30 MINS: Performed by: NURSE PRACTITIONER

## 2024-10-27 PROCEDURE — 83735 ASSAY OF MAGNESIUM: CPT

## 2024-10-27 PROCEDURE — 6370000000 HC RX 637 (ALT 250 FOR IP): Performed by: NURSE PRACTITIONER

## 2024-10-27 PROCEDURE — 6360000002 HC RX W HCPCS: Performed by: INTERNAL MEDICINE

## 2024-10-27 PROCEDURE — APPSS15 APP SPLIT SHARED TIME 0-15 MINUTES: Performed by: NURSE PRACTITIONER

## 2024-10-27 PROCEDURE — 1200000000 HC SEMI PRIVATE

## 2024-10-27 PROCEDURE — 2580000003 HC RX 258: Performed by: SURGERY

## 2024-10-27 RX ADMIN — GABAPENTIN 100 MG: 100 CAPSULE ORAL at 14:55

## 2024-10-27 RX ADMIN — Medication 10 ML: at 22:03

## 2024-10-27 RX ADMIN — MEROPENEM 1000 MG: 1 INJECTION INTRAVENOUS at 10:45

## 2024-10-27 RX ADMIN — VANCOMYCIN HYDROCHLORIDE 1250 MG: 1.25 INJECTION, POWDER, LYOPHILIZED, FOR SOLUTION INTRAVENOUS at 22:11

## 2024-10-27 RX ADMIN — GABAPENTIN 100 MG: 100 CAPSULE ORAL at 22:02

## 2024-10-27 RX ADMIN — GABAPENTIN 100 MG: 100 CAPSULE ORAL at 10:29

## 2024-10-27 RX ADMIN — MELATONIN TAB 3 MG 3 MG: 3 TAB at 22:03

## 2024-10-27 RX ADMIN — MORPHINE SULFATE 2 MG: 2 INJECTION, SOLUTION INTRAMUSCULAR; INTRAVENOUS at 17:17

## 2024-10-27 RX ADMIN — Medication 10 ML: at 10:28

## 2024-10-27 RX ADMIN — FLUCONAZOLE 400 MG: 400 INJECTION, SOLUTION INTRAVENOUS at 14:56

## 2024-10-27 RX ADMIN — ENOXAPARIN SODIUM 30 MG: 100 INJECTION SUBCUTANEOUS at 10:29

## 2024-10-27 RX ADMIN — Medication 1 CAPSULE: at 17:17

## 2024-10-27 RX ADMIN — MEROPENEM 1000 MG: 1 INJECTION INTRAVENOUS at 17:22

## 2024-10-27 RX ADMIN — MORPHINE SULFATE 2 MG: 2 INJECTION, SOLUTION INTRAMUSCULAR; INTRAVENOUS at 10:25

## 2024-10-27 RX ADMIN — VANCOMYCIN HYDROCHLORIDE 1250 MG: 1.25 INJECTION, POWDER, LYOPHILIZED, FOR SOLUTION INTRAVENOUS at 10:41

## 2024-10-27 RX ADMIN — MORPHINE SULFATE 2 MG: 2 INJECTION, SOLUTION INTRAMUSCULAR; INTRAVENOUS at 22:03

## 2024-10-27 RX ADMIN — Medication 1 CAPSULE: at 10:48

## 2024-10-27 RX ADMIN — MORPHINE SULFATE 2 MG: 2 INJECTION, SOLUTION INTRAMUSCULAR; INTRAVENOUS at 03:33

## 2024-10-27 RX ADMIN — MORPHINE SULFATE 2 MG: 2 INJECTION, SOLUTION INTRAMUSCULAR; INTRAVENOUS at 06:43

## 2024-10-27 RX ADMIN — MEROPENEM 1000 MG: 1 INJECTION INTRAVENOUS at 01:19

## 2024-10-27 ASSESSMENT — PAIN SCALES - GENERAL
PAINLEVEL_OUTOF10: 3
PAINLEVEL_OUTOF10: 8
PAINLEVEL_OUTOF10: 3
PAINLEVEL_OUTOF10: 9
PAINLEVEL_OUTOF10: 7
PAINLEVEL_OUTOF10: 5
PAINLEVEL_OUTOF10: 8

## 2024-10-27 ASSESSMENT — PAIN - FUNCTIONAL ASSESSMENT: PAIN_FUNCTIONAL_ASSESSMENT: ACTIVITIES ARE NOT PREVENTED

## 2024-10-27 ASSESSMENT — PAIN DESCRIPTION - LOCATION
LOCATION: ABDOMEN
LOCATION: ABDOMEN;BACK
LOCATION: ABDOMEN
LOCATION: ABDOMEN;BACK
LOCATION: ABDOMEN;BACK;GROIN

## 2024-10-27 ASSESSMENT — PAIN DESCRIPTION - DESCRIPTORS
DESCRIPTORS: ACHING;THROBBING

## 2024-10-27 ASSESSMENT — PAIN DESCRIPTION - FREQUENCY: FREQUENCY: CONTINUOUS

## 2024-10-27 ASSESSMENT — PAIN DESCRIPTION - PAIN TYPE: TYPE: ACUTE PAIN

## 2024-10-27 ASSESSMENT — PAIN DESCRIPTION - ORIENTATION
ORIENTATION: LEFT
ORIENTATION: LEFT

## 2024-10-27 NOTE — PLAN OF CARE
Problem: Safety - Adult  Goal: Free from fall injury  10/27/2024 0025 by Grace Huang RN  Outcome: Progressing

## 2024-10-27 NOTE — PLAN OF CARE
Problem: Pain  Goal: Verbalizes/displays adequate comfort level or baseline comfort level  Outcome: Progressing     Problem: Safety - Adult  Goal: Free from fall injury  10/27/2024 1107 by Kristin Morris, RN  Outcome: Progressing  10/27/2024 0025 by Grace Huang, RN  Outcome: Progressing     Problem: Cardiovascular - Adult  Goal: Absence of cardiac dysrhythmias or at baseline  Outcome: Progressing     Problem: Gastrointestinal - Adult  Goal: Minimal or absence of nausea and vomiting  Outcome: Progressing  Goal: Maintains adequate nutritional intake  Outcome: Progressing

## 2024-10-28 ENCOUNTER — APPOINTMENT (OUTPATIENT)
Dept: CT IMAGING | Age: 54
DRG: 853 | End: 2024-10-28
Payer: COMMERCIAL

## 2024-10-28 LAB
ALBUMIN SERPL-MCNC: 3.1 G/DL (ref 3.4–5)
ALBUMIN/GLOB SERPL: 1 {RATIO} (ref 1.1–2.2)
ALP SERPL-CCNC: 96 U/L (ref 40–129)
ALT SERPL-CCNC: 8 U/L (ref 10–40)
ANION GAP SERPL CALCULATED.3IONS-SCNC: 9 MMOL/L (ref 3–16)
ANISOCYTOSIS BLD QL SMEAR: ABNORMAL
AST SERPL-CCNC: 14 U/L (ref 15–37)
BASOPHILS # BLD: 0 K/UL (ref 0–0.2)
BASOPHILS NFR BLD: 0 %
BILIRUB SERPL-MCNC: <0.2 MG/DL (ref 0–1)
BUN SERPL-MCNC: 7 MG/DL (ref 7–20)
CALCIUM SERPL-MCNC: 8.8 MG/DL (ref 8.3–10.6)
CHLORIDE SERPL-SCNC: 97 MMOL/L (ref 99–110)
CO2 SERPL-SCNC: 28 MMOL/L (ref 21–32)
CREAT SERPL-MCNC: <0.5 MG/DL (ref 0.6–1.1)
DEPRECATED RDW RBC AUTO: 16.6 % (ref 12.4–15.4)
EOSINOPHIL # BLD: 0.1 K/UL (ref 0–0.6)
EOSINOPHIL NFR BLD: 1 %
FERRITIN SERPL IA-MCNC: 790 NG/ML (ref 15–150)
FOLATE SERPL-MCNC: 23.5 NG/ML (ref 4.78–24.2)
GFR SERPLBLD CREATININE-BSD FMLA CKD-EPI: >90 ML/MIN/{1.73_M2}
GLUCOSE BLD-MCNC: 116 MG/DL (ref 70–99)
GLUCOSE BLD-MCNC: 131 MG/DL (ref 70–99)
GLUCOSE BLD-MCNC: 159 MG/DL (ref 70–99)
GLUCOSE BLD-MCNC: 99 MG/DL (ref 70–99)
GLUCOSE SERPL-MCNC: 116 MG/DL (ref 70–99)
HCT VFR BLD AUTO: 28.3 % (ref 36–48)
HGB BLD-MCNC: 9.6 G/DL (ref 12–16)
IRON SATN MFR SERPL: 9 % (ref 15–50)
IRON SERPL-MCNC: 14 UG/DL (ref 37–145)
LYMPHOCYTES # BLD: 0.6 K/UL (ref 1–5.1)
LYMPHOCYTES NFR BLD: 6 %
MAGNESIUM SERPL-MCNC: 2 MG/DL (ref 1.8–2.4)
MCH RBC QN AUTO: 28.5 PG (ref 26–34)
MCHC RBC AUTO-ENTMCNC: 34 G/DL (ref 31–36)
MCV RBC AUTO: 83.7 FL (ref 80–100)
MICROCYTES BLD QL SMEAR: ABNORMAL
MONOCYTES # BLD: 0.8 K/UL (ref 0–1.3)
MONOCYTES NFR BLD: 9 %
NEUTROPHILS # BLD: 7.7 K/UL (ref 1.7–7.7)
NEUTROPHILS NFR BLD: 84 %
PERFORMED ON: ABNORMAL
PERFORMED ON: NORMAL
PLATELET # BLD AUTO: 295 K/UL (ref 135–450)
PLATELET BLD QL SMEAR: ADEQUATE
PMV BLD AUTO: 8.4 FL (ref 5–10.5)
POTASSIUM SERPL-SCNC: 4.2 MMOL/L (ref 3.5–5.1)
PROT SERPL-MCNC: 6.2 G/DL (ref 6.4–8.2)
RBC # BLD AUTO: 3.38 M/UL (ref 4–5.2)
SLIDE REVIEW: ABNORMAL
SODIUM SERPL-SCNC: 134 MMOL/L (ref 136–145)
TIBC SERPL-MCNC: 152 UG/DL (ref 260–445)
TRANSFERRIN SERPL-MCNC: 137 MG/DL (ref 200–360)
VIT B12 SERPL-MCNC: >4000 PG/ML (ref 211–911)
WBC # BLD AUTO: 9.2 K/UL (ref 4–11)

## 2024-10-28 PROCEDURE — 6360000002 HC RX W HCPCS: Performed by: INTERNAL MEDICINE

## 2024-10-28 PROCEDURE — 2580000003 HC RX 258: Performed by: SURGERY

## 2024-10-28 PROCEDURE — APPSS15 APP SPLIT SHARED TIME 0-15 MINUTES: Performed by: NURSE PRACTITIONER

## 2024-10-28 PROCEDURE — 85025 COMPLETE CBC W/AUTO DIFF WBC: CPT

## 2024-10-28 PROCEDURE — 84466 ASSAY OF TRANSFERRIN: CPT

## 2024-10-28 PROCEDURE — 2580000003 HC RX 258: Performed by: INTERNAL MEDICINE

## 2024-10-28 PROCEDURE — 6360000002 HC RX W HCPCS: Performed by: SURGERY

## 2024-10-28 PROCEDURE — 6370000000 HC RX 637 (ALT 250 FOR IP): Performed by: NURSE PRACTITIONER

## 2024-10-28 PROCEDURE — 74177 CT ABD & PELVIS W/CONTRAST: CPT

## 2024-10-28 PROCEDURE — 82746 ASSAY OF FOLIC ACID SERUM: CPT

## 2024-10-28 PROCEDURE — 6360000002 HC RX W HCPCS: Performed by: NURSE PRACTITIONER

## 2024-10-28 PROCEDURE — 36591 DRAW BLOOD OFF VENOUS DEVICE: CPT

## 2024-10-28 PROCEDURE — 6370000000 HC RX 637 (ALT 250 FOR IP): Performed by: INTERNAL MEDICINE

## 2024-10-28 PROCEDURE — 83540 ASSAY OF IRON: CPT

## 2024-10-28 PROCEDURE — 6370000000 HC RX 637 (ALT 250 FOR IP): Performed by: STUDENT IN AN ORGANIZED HEALTH CARE EDUCATION/TRAINING PROGRAM

## 2024-10-28 PROCEDURE — 82728 ASSAY OF FERRITIN: CPT

## 2024-10-28 PROCEDURE — 2580000003 HC RX 258: Performed by: NURSE PRACTITIONER

## 2024-10-28 PROCEDURE — 6360000004 HC RX CONTRAST MEDICATION: Performed by: NURSE PRACTITIONER

## 2024-10-28 PROCEDURE — APPNB30 APP NON BILLABLE TIME 0-30 MINS: Performed by: NURSE PRACTITIONER

## 2024-10-28 PROCEDURE — 80053 COMPREHEN METABOLIC PANEL: CPT

## 2024-10-28 PROCEDURE — 83735 ASSAY OF MAGNESIUM: CPT

## 2024-10-28 PROCEDURE — 6360000002 HC RX W HCPCS: Performed by: STUDENT IN AN ORGANIZED HEALTH CARE EDUCATION/TRAINING PROGRAM

## 2024-10-28 PROCEDURE — 82607 VITAMIN B-12: CPT

## 2024-10-28 PROCEDURE — 1200000000 HC SEMI PRIVATE

## 2024-10-28 PROCEDURE — 99232 SBSQ HOSP IP/OBS MODERATE 35: CPT | Performed by: SURGERY

## 2024-10-28 PROCEDURE — 99233 SBSQ HOSP IP/OBS HIGH 50: CPT | Performed by: INTERNAL MEDICINE

## 2024-10-28 RX ORDER — IOPAMIDOL 755 MG/ML
75 INJECTION, SOLUTION INTRAVASCULAR
Status: COMPLETED | OUTPATIENT
Start: 2024-10-28 | End: 2024-10-28

## 2024-10-28 RX ORDER — DIATRIZOATE MEGLUMINE AND DIATRIZOATE SODIUM 660; 100 MG/ML; MG/ML
20 SOLUTION ORAL; RECTAL ONCE
Status: DISCONTINUED | OUTPATIENT
Start: 2024-10-28 | End: 2024-11-10 | Stop reason: HOSPADM

## 2024-10-28 RX ORDER — SODIUM PHOSPHATE, DIBASIC AND SODIUM PHOSPHATE, MONOBASIC 7; 19 G/230ML; G/230ML
1 ENEMA RECTAL ONCE
Status: COMPLETED | OUTPATIENT
Start: 2024-10-28 | End: 2024-10-28

## 2024-10-28 RX ORDER — FUROSEMIDE 10 MG/ML
20 INJECTION INTRAMUSCULAR; INTRAVENOUS 2 TIMES DAILY
Status: COMPLETED | OUTPATIENT
Start: 2024-10-28 | End: 2024-10-29

## 2024-10-28 RX ADMIN — GABAPENTIN 100 MG: 100 CAPSULE ORAL at 09:17

## 2024-10-28 RX ADMIN — IOPAMIDOL 75 ML: 755 INJECTION, SOLUTION INTRAVENOUS at 07:43

## 2024-10-28 RX ADMIN — Medication 10 ML: at 09:17

## 2024-10-28 RX ADMIN — Medication 10 ML: at 21:13

## 2024-10-28 RX ADMIN — MEROPENEM 1000 MG: 1 INJECTION INTRAVENOUS at 17:45

## 2024-10-28 RX ADMIN — GABAPENTIN 100 MG: 100 CAPSULE ORAL at 21:13

## 2024-10-28 RX ADMIN — MORPHINE SULFATE 2 MG: 2 INJECTION, SOLUTION INTRAMUSCULAR; INTRAVENOUS at 10:50

## 2024-10-28 RX ADMIN — MEROPENEM 1000 MG: 1 INJECTION INTRAVENOUS at 09:15

## 2024-10-28 RX ADMIN — FLUCONAZOLE 400 MG: 400 INJECTION, SOLUTION INTRAVENOUS at 15:17

## 2024-10-28 RX ADMIN — MORPHINE SULFATE 2 MG: 2 INJECTION, SOLUTION INTRAMUSCULAR; INTRAVENOUS at 04:24

## 2024-10-28 RX ADMIN — FUROSEMIDE 20 MG: 10 INJECTION, SOLUTION INTRAMUSCULAR; INTRAVENOUS at 13:11

## 2024-10-28 RX ADMIN — VANCOMYCIN HYDROCHLORIDE 1250 MG: 1.25 INJECTION, POWDER, LYOPHILIZED, FOR SOLUTION INTRAVENOUS at 09:15

## 2024-10-28 RX ADMIN — MEROPENEM 1000 MG: 1 INJECTION INTRAVENOUS at 01:34

## 2024-10-28 RX ADMIN — SODIUM PHOSPHATE 1 ENEMA: 7; 19 ENEMA RECTAL at 15:20

## 2024-10-28 RX ADMIN — FUROSEMIDE 20 MG: 10 INJECTION, SOLUTION INTRAMUSCULAR; INTRAVENOUS at 17:47

## 2024-10-28 RX ADMIN — Medication 1 CAPSULE: at 09:17

## 2024-10-28 RX ADMIN — MELATONIN TAB 3 MG 3 MG: 3 TAB at 21:13

## 2024-10-28 RX ADMIN — Medication 1 CAPSULE: at 17:47

## 2024-10-28 RX ADMIN — ENOXAPARIN SODIUM 30 MG: 100 INJECTION SUBCUTANEOUS at 09:17

## 2024-10-28 RX ADMIN — GABAPENTIN 100 MG: 100 CAPSULE ORAL at 15:17

## 2024-10-28 ASSESSMENT — PAIN SCALES - GENERAL
PAINLEVEL_OUTOF10: 7
PAINLEVEL_OUTOF10: 5
PAINLEVEL_OUTOF10: 8
PAINLEVEL_OUTOF10: 2

## 2024-10-28 ASSESSMENT — PAIN - FUNCTIONAL ASSESSMENT: PAIN_FUNCTIONAL_ASSESSMENT: ACTIVITIES ARE NOT PREVENTED

## 2024-10-28 ASSESSMENT — PAIN DESCRIPTION - DESCRIPTORS
DESCRIPTORS: ACHING;CRAMPING
DESCRIPTORS: ACHING

## 2024-10-28 ASSESSMENT — PAIN DESCRIPTION - LOCATION
LOCATION: ABDOMEN
LOCATION: ABDOMEN

## 2024-10-28 NOTE — DISCHARGE INSTR - COC
Continuity of Care Form    Patient Name: Stephani Reyes   :  1970  MRN:  [id number]    Admit date:  10/22/2024  Discharge date:  11/10/24    Code Status Order: Full Code   Advance Directives:   Advance Care Flowsheet Documentation             Admitting Physician:  Chika Lancaster MD  PCP: Reyes, Eleia J, MD    Discharging Nurse: VIRAL Castro,RN  Discharging Hospital Unit/Room#: [address] [address]  Discharging Unit Phone Number: [phone]    Emergency Contact:   Extended Emergency Contact Information  Primary Emergency Contact: Huber Reyes  Address: [address] [place], [place] [address] [facility]  Home Phone: [phone]  Work Phone: [phone]  Mobile Phone: [phone]  Relation: Spouse    Past Surgical History:  Past Surgical History:   Procedure Laterality Date    CHOLECYSTECTOMY, LAPAROSCOPIC N/A 2024    LAPAROSCOPIC CHOLECYSTECTOMY WITH CHOLANGIOGRAM performed by Iam Nolasco MD at [facility] OR    COLONOSCOPY  2023    With polypectomy performed by Dr. Ren Yee    HYSTERECTOMY, TOTAL ABDOMINAL (CERVIX REMOVED)  2024    DIAGNOSTIC LAPAROSCOPY, ROBOT TOTAL HYSTERECTOMY, BILATERAL SALPINGO-OOPHORECTOMY, HAND ASSISTED EXTRACTION by Dr. Courtney Ogden    LUNG SURGERY  2023    s/p Robotic evacuation of pleural effusion, pleural biopsy x2 ,partial pleurectomy , mechanical pleurodesis,talc pleurodesis    PORT SURGERY Left 2024    POWER PORT-A-CATHETER PLACEMENT WITH C-ARM performed by Iam Nolasco MD at [facility] OR    UMBILICAL HERNIA REPAIR N/A 2024    OPEN UMBILICAL HERNIA REPAIR performed by Iam Nolasco MD at [facility] OR       Immunization History:   Immunization History   Administered Date(s) Administered    COVID-19, PFIZER PURPLE top, DILUTE for use, (age 12 y+), 30mcg/0.3mL 2021, 2021, 2021    Hep A, HAVRIX, VAQTA, (age 19y+), IM, 1mL 2010

## 2024-10-29 ENCOUNTER — APPOINTMENT (OUTPATIENT)
Dept: GENERAL RADIOLOGY | Age: 54
DRG: 853 | End: 2024-10-29
Payer: COMMERCIAL

## 2024-10-29 PROBLEM — E44.0 MODERATE MALNUTRITION (HCC): Chronic | Status: ACTIVE | Noted: 2024-10-29

## 2024-10-29 LAB
ALBUMIN SERPL-MCNC: 3.3 G/DL (ref 3.4–5)
ALBUMIN/GLOB SERPL: 0.9 {RATIO} (ref 1.1–2.2)
ALP SERPL-CCNC: 99 U/L (ref 40–129)
ALT SERPL-CCNC: 9 U/L (ref 10–40)
ANION GAP SERPL CALCULATED.3IONS-SCNC: 10 MMOL/L (ref 3–16)
AST SERPL-CCNC: 15 U/L (ref 15–37)
BASOPHILS # BLD: 0 K/UL (ref 0–0.2)
BASOPHILS NFR BLD: 0.4 %
BILIRUB SERPL-MCNC: <0.2 MG/DL (ref 0–1)
BUN SERPL-MCNC: 10 MG/DL (ref 7–20)
CALCIUM SERPL-MCNC: 9 MG/DL (ref 8.3–10.6)
CHLORIDE SERPL-SCNC: 97 MMOL/L (ref 99–110)
CO2 SERPL-SCNC: 30 MMOL/L (ref 21–32)
CREAT SERPL-MCNC: <0.5 MG/DL (ref 0.6–1.1)
DEPRECATED RDW RBC AUTO: 16.6 % (ref 12.4–15.4)
EOSINOPHIL # BLD: 0.1 K/UL (ref 0–0.6)
EOSINOPHIL NFR BLD: 1.4 %
GFR SERPLBLD CREATININE-BSD FMLA CKD-EPI: >90 ML/MIN/{1.73_M2}
GLUCOSE BLD-MCNC: 105 MG/DL (ref 70–99)
GLUCOSE BLD-MCNC: 109 MG/DL (ref 70–99)
GLUCOSE BLD-MCNC: 112 MG/DL (ref 70–99)
GLUCOSE BLD-MCNC: 121 MG/DL (ref 70–99)
GLUCOSE SERPL-MCNC: 116 MG/DL (ref 70–99)
HCT VFR BLD AUTO: 29.5 % (ref 36–48)
HGB BLD-MCNC: 10 G/DL (ref 12–16)
LYMPHOCYTES # BLD: 0.9 K/UL (ref 1–5.1)
LYMPHOCYTES NFR BLD: 10.7 %
MAGNESIUM SERPL-MCNC: 2.11 MG/DL (ref 1.8–2.4)
MCH RBC QN AUTO: 28.2 PG (ref 26–34)
MCHC RBC AUTO-ENTMCNC: 33.8 G/DL (ref 31–36)
MCV RBC AUTO: 83.4 FL (ref 80–100)
MONOCYTES # BLD: 0.9 K/UL (ref 0–1.3)
MONOCYTES NFR BLD: 11.1 %
NEUTROPHILS # BLD: 6.4 K/UL (ref 1.7–7.7)
NEUTROPHILS NFR BLD: 76.4 %
PERFORMED ON: ABNORMAL
PLATELET # BLD AUTO: 379 K/UL (ref 135–450)
PMV BLD AUTO: 7.8 FL (ref 5–10.5)
POTASSIUM SERPL-SCNC: 4.2 MMOL/L (ref 3.5–5.1)
PROT SERPL-MCNC: 6.8 G/DL (ref 6.4–8.2)
RBC # BLD AUTO: 3.53 M/UL (ref 4–5.2)
SODIUM SERPL-SCNC: 137 MMOL/L (ref 136–145)
WBC # BLD AUTO: 8.4 K/UL (ref 4–11)

## 2024-10-29 PROCEDURE — 99232 SBSQ HOSP IP/OBS MODERATE 35: CPT | Performed by: INTERNAL MEDICINE

## 2024-10-29 PROCEDURE — 1200000000 HC SEMI PRIVATE

## 2024-10-29 PROCEDURE — 6360000002 HC RX W HCPCS: Performed by: NURSE PRACTITIONER

## 2024-10-29 PROCEDURE — 99232 SBSQ HOSP IP/OBS MODERATE 35: CPT | Performed by: SURGERY

## 2024-10-29 PROCEDURE — 80053 COMPREHEN METABOLIC PANEL: CPT

## 2024-10-29 PROCEDURE — 2580000003 HC RX 258: Performed by: NURSE PRACTITIONER

## 2024-10-29 PROCEDURE — 6360000002 HC RX W HCPCS: Performed by: SURGERY

## 2024-10-29 PROCEDURE — 2580000003 HC RX 258: Performed by: SURGERY

## 2024-10-29 PROCEDURE — 6360000002 HC RX W HCPCS: Performed by: INTERNAL MEDICINE

## 2024-10-29 PROCEDURE — 74270 X-RAY XM COLON 1CNTRST STD: CPT

## 2024-10-29 PROCEDURE — 83735 ASSAY OF MAGNESIUM: CPT

## 2024-10-29 PROCEDURE — 6370000000 HC RX 637 (ALT 250 FOR IP): Performed by: INTERNAL MEDICINE

## 2024-10-29 PROCEDURE — APPSS15 APP SPLIT SHARED TIME 0-15 MINUTES: Performed by: NURSE PRACTITIONER

## 2024-10-29 PROCEDURE — 6370000000 HC RX 637 (ALT 250 FOR IP): Performed by: NURSE PRACTITIONER

## 2024-10-29 PROCEDURE — 6360000002 HC RX W HCPCS: Performed by: STUDENT IN AN ORGANIZED HEALTH CARE EDUCATION/TRAINING PROGRAM

## 2024-10-29 PROCEDURE — APPNB30 APP NON BILLABLE TIME 0-30 MINS: Performed by: NURSE PRACTITIONER

## 2024-10-29 PROCEDURE — 6370000000 HC RX 637 (ALT 250 FOR IP): Performed by: STUDENT IN AN ORGANIZED HEALTH CARE EDUCATION/TRAINING PROGRAM

## 2024-10-29 PROCEDURE — 6360000004 HC RX CONTRAST MEDICATION

## 2024-10-29 PROCEDURE — 85025 COMPLETE CBC W/AUTO DIFF WBC: CPT

## 2024-10-29 RX ORDER — DIATRIZOATE MEGLUMINE AND DIATRIZOATE SODIUM 660; 100 MG/ML; MG/ML
SOLUTION ORAL; RECTAL
Status: COMPLETED
Start: 2024-10-29 | End: 2024-10-29

## 2024-10-29 RX ADMIN — MELATONIN TAB 3 MG 3 MG: 3 TAB at 21:20

## 2024-10-29 RX ADMIN — MEROPENEM 1000 MG: 1 INJECTION INTRAVENOUS at 08:47

## 2024-10-29 RX ADMIN — MORPHINE SULFATE 2 MG: 2 INJECTION, SOLUTION INTRAMUSCULAR; INTRAVENOUS at 00:51

## 2024-10-29 RX ADMIN — FLUCONAZOLE 400 MG: 400 INJECTION, SOLUTION INTRAVENOUS at 14:40

## 2024-10-29 RX ADMIN — MEROPENEM 1000 MG: 1 INJECTION INTRAVENOUS at 16:50

## 2024-10-29 RX ADMIN — Medication 10 ML: at 08:40

## 2024-10-29 RX ADMIN — FUROSEMIDE 20 MG: 10 INJECTION, SOLUTION INTRAMUSCULAR; INTRAVENOUS at 08:40

## 2024-10-29 RX ADMIN — GABAPENTIN 100 MG: 100 CAPSULE ORAL at 21:13

## 2024-10-29 RX ADMIN — GABAPENTIN 100 MG: 100 CAPSULE ORAL at 14:40

## 2024-10-29 RX ADMIN — Medication 1 CAPSULE: at 16:50

## 2024-10-29 RX ADMIN — Medication 1 CAPSULE: at 08:40

## 2024-10-29 RX ADMIN — GABAPENTIN 100 MG: 100 CAPSULE ORAL at 08:40

## 2024-10-29 RX ADMIN — MORPHINE SULFATE 2 MG: 2 INJECTION, SOLUTION INTRAMUSCULAR; INTRAVENOUS at 08:43

## 2024-10-29 RX ADMIN — DIATRIZOATE MEGLUMINE AND DIATRIZOATE SODIUM 720 ML: 660; 100 LIQUID ORAL; RECTAL at 10:47

## 2024-10-29 RX ADMIN — MEROPENEM 1000 MG: 1 INJECTION INTRAVENOUS at 00:54

## 2024-10-29 ASSESSMENT — PAIN DESCRIPTION - DESCRIPTORS: DESCRIPTORS: ACHING

## 2024-10-29 ASSESSMENT — PAIN SCALES - GENERAL
PAINLEVEL_OUTOF10: 7
PAINLEVEL_OUTOF10: 7

## 2024-10-29 ASSESSMENT — PAIN DESCRIPTION - LOCATION: LOCATION: ABDOMEN

## 2024-10-30 ENCOUNTER — ANESTHESIA EVENT (OUTPATIENT)
Dept: OPERATING ROOM | Age: 54
End: 2024-10-30
Payer: COMMERCIAL

## 2024-10-30 LAB
ALBUMIN SERPL-MCNC: 3.7 G/DL (ref 3.4–5)
ALBUMIN/GLOB SERPL: 1 {RATIO} (ref 1.1–2.2)
ALP SERPL-CCNC: 129 U/L (ref 40–129)
ALT SERPL-CCNC: 11 U/L (ref 10–40)
ANION GAP SERPL CALCULATED.3IONS-SCNC: 13 MMOL/L (ref 3–16)
AST SERPL-CCNC: 16 U/L (ref 15–37)
BASOPHILS # BLD: 0.1 K/UL (ref 0–0.2)
BASOPHILS NFR BLD: 1 %
BILIRUB SERPL-MCNC: <0.2 MG/DL (ref 0–1)
BUN SERPL-MCNC: 14 MG/DL (ref 7–20)
CALCIUM SERPL-MCNC: 9.7 MG/DL (ref 8.3–10.6)
CHLORIDE SERPL-SCNC: 93 MMOL/L (ref 99–110)
CO2 SERPL-SCNC: 27 MMOL/L (ref 21–32)
CREAT SERPL-MCNC: 0.4 MG/DL (ref 0.6–1.1)
DEPRECATED RDW RBC AUTO: 16.1 % (ref 12.4–15.4)
EOSINOPHIL # BLD: 0 K/UL (ref 0–0.6)
EOSINOPHIL NFR BLD: 0.5 %
GFR SERPLBLD CREATININE-BSD FMLA CKD-EPI: >90 ML/MIN/{1.73_M2}
GLUCOSE BLD-MCNC: 175 MG/DL (ref 70–99)
GLUCOSE BLD-MCNC: 78 MG/DL (ref 70–99)
GLUCOSE SERPL-MCNC: 87 MG/DL (ref 70–99)
HCT VFR BLD AUTO: 31 % (ref 36–48)
HGB BLD-MCNC: 10.6 G/DL (ref 12–16)
LYMPHOCYTES # BLD: 0.9 K/UL (ref 1–5.1)
LYMPHOCYTES NFR BLD: 8.9 %
MAGNESIUM SERPL-MCNC: 2.29 MG/DL (ref 1.8–2.4)
MCH RBC QN AUTO: 28.7 PG (ref 26–34)
MCHC RBC AUTO-ENTMCNC: 34.2 G/DL (ref 31–36)
MCV RBC AUTO: 83.9 FL (ref 80–100)
MONOCYTES # BLD: 0.8 K/UL (ref 0–1.3)
MONOCYTES NFR BLD: 8.3 %
NEUTROPHILS # BLD: 8.3 K/UL (ref 1.7–7.7)
NEUTROPHILS NFR BLD: 81.3 %
PERFORMED ON: ABNORMAL
PERFORMED ON: NORMAL
PLATELET # BLD AUTO: 469 K/UL (ref 135–450)
PMV BLD AUTO: 7.6 FL (ref 5–10.5)
POTASSIUM SERPL-SCNC: 4.2 MMOL/L (ref 3.5–5.1)
PROT SERPL-MCNC: 7.4 G/DL (ref 6.4–8.2)
RBC # BLD AUTO: 3.69 M/UL (ref 4–5.2)
SODIUM SERPL-SCNC: 133 MMOL/L (ref 136–145)
WBC # BLD AUTO: 10.2 K/UL (ref 4–11)

## 2024-10-30 PROCEDURE — 80053 COMPREHEN METABOLIC PANEL: CPT

## 2024-10-30 PROCEDURE — 99232 SBSQ HOSP IP/OBS MODERATE 35: CPT | Performed by: SURGERY

## 2024-10-30 PROCEDURE — 1200000000 HC SEMI PRIVATE

## 2024-10-30 PROCEDURE — 85025 COMPLETE CBC W/AUTO DIFF WBC: CPT

## 2024-10-30 PROCEDURE — 6370000000 HC RX 637 (ALT 250 FOR IP): Performed by: STUDENT IN AN ORGANIZED HEALTH CARE EDUCATION/TRAINING PROGRAM

## 2024-10-30 PROCEDURE — 99232 SBSQ HOSP IP/OBS MODERATE 35: CPT | Performed by: INTERNAL MEDICINE

## 2024-10-30 PROCEDURE — 2580000003 HC RX 258: Performed by: NURSE PRACTITIONER

## 2024-10-30 PROCEDURE — 2580000003 HC RX 258: Performed by: SURGERY

## 2024-10-30 PROCEDURE — 83735 ASSAY OF MAGNESIUM: CPT

## 2024-10-30 PROCEDURE — APPNB30 APP NON BILLABLE TIME 0-30 MINS: Performed by: NURSE PRACTITIONER

## 2024-10-30 PROCEDURE — 6360000002 HC RX W HCPCS: Performed by: SURGERY

## 2024-10-30 PROCEDURE — 6370000000 HC RX 637 (ALT 250 FOR IP): Performed by: NURSE PRACTITIONER

## 2024-10-30 PROCEDURE — 6370000000 HC RX 637 (ALT 250 FOR IP): Performed by: INTERNAL MEDICINE

## 2024-10-30 PROCEDURE — 6360000002 HC RX W HCPCS: Performed by: STUDENT IN AN ORGANIZED HEALTH CARE EDUCATION/TRAINING PROGRAM

## 2024-10-30 PROCEDURE — APPSS15 APP SPLIT SHARED TIME 0-15 MINUTES: Performed by: NURSE PRACTITIONER

## 2024-10-30 PROCEDURE — 6360000002 HC RX W HCPCS: Performed by: NURSE PRACTITIONER

## 2024-10-30 RX ADMIN — MORPHINE SULFATE 2 MG: 2 INJECTION, SOLUTION INTRAMUSCULAR; INTRAVENOUS at 03:54

## 2024-10-30 RX ADMIN — GABAPENTIN 100 MG: 100 CAPSULE ORAL at 21:30

## 2024-10-30 RX ADMIN — Medication 10 ML: at 09:32

## 2024-10-30 RX ADMIN — GABAPENTIN 100 MG: 100 CAPSULE ORAL at 14:03

## 2024-10-30 RX ADMIN — MELATONIN TAB 3 MG 3 MG: 3 TAB at 21:34

## 2024-10-30 RX ADMIN — FLUCONAZOLE 400 MG: 400 INJECTION, SOLUTION INTRAVENOUS at 14:03

## 2024-10-30 RX ADMIN — Medication 1 CAPSULE: at 16:26

## 2024-10-30 RX ADMIN — GABAPENTIN 100 MG: 100 CAPSULE ORAL at 09:25

## 2024-10-30 RX ADMIN — MEROPENEM 1000 MG: 1 INJECTION INTRAVENOUS at 00:43

## 2024-10-30 RX ADMIN — POLYETHYLENE GLYCOL-3350 AND ELECTROLYTES 2000 ML: 236; 6.74; 5.86; 2.97; 22.74 POWDER, FOR SOLUTION ORAL at 14:03

## 2024-10-30 RX ADMIN — MEROPENEM 1000 MG: 1 INJECTION INTRAVENOUS at 16:29

## 2024-10-30 RX ADMIN — Medication 1 CAPSULE: at 09:25

## 2024-10-30 RX ADMIN — Medication 10 ML: at 21:30

## 2024-10-30 RX ADMIN — MEROPENEM 1000 MG: 1 INJECTION INTRAVENOUS at 09:29

## 2024-10-30 ASSESSMENT — PAIN DESCRIPTION - LOCATION: LOCATION: ABDOMEN

## 2024-10-30 ASSESSMENT — PAIN SCALES - GENERAL
PAINLEVEL_OUTOF10: 4
PAINLEVEL_OUTOF10: 7

## 2024-10-30 ASSESSMENT — PAIN DESCRIPTION - ORIENTATION: ORIENTATION: LEFT

## 2024-10-30 ASSESSMENT — PAIN DESCRIPTION - DESCRIPTORS: DESCRIPTORS: PRESSURE;SHARP

## 2024-10-30 NOTE — ANESTHESIA PRE PROCEDURE
blood products.    Plan discussed with CRNA.    Attending anesthesiologist reviewed and agrees with Preprocedure content              DEMIAN Rodrigues - CRNA   10/30/2024

## 2024-10-30 NOTE — PLAN OF CARE
Problem: Pain  Goal: Verbalizes/displays adequate comfort level or baseline comfort level  Outcome: Progressing     Problem: Safety - Adult  Goal: Free from fall injury  Outcome: Progressing     Problem: Cardiovascular - Adult  Goal: Absence of cardiac dysrhythmias or at baseline  Outcome: Progressing     Problem: Gastrointestinal - Adult  Goal: Minimal or absence of nausea and vomiting  Outcome: Progressing  Goal: Maintains adequate nutritional intake  Outcome: Progressing     Problem: Nutrition Deficit:  Goal: Optimize nutritional status  Outcome: Progressing

## 2024-10-31 ENCOUNTER — ANESTHESIA (OUTPATIENT)
Dept: OPERATING ROOM | Age: 54
End: 2024-10-31
Payer: COMMERCIAL

## 2024-10-31 PROBLEM — K66.0 INTESTINAL ADHESIONS: Status: ACTIVE | Noted: 2024-10-31

## 2024-10-31 PROBLEM — K63.1 PERFORATED SIGMOID COLON (HCC): Status: ACTIVE | Noted: 2024-10-31

## 2024-10-31 LAB
ALBUMIN SERPL-MCNC: 3.6 G/DL (ref 3.4–5)
ALBUMIN/GLOB SERPL: 1 {RATIO} (ref 1.1–2.2)
ALP SERPL-CCNC: 113 U/L (ref 40–129)
ALT SERPL-CCNC: 7 U/L (ref 10–40)
ANION GAP SERPL CALCULATED.3IONS-SCNC: 12 MMOL/L (ref 3–16)
AST SERPL-CCNC: 19 U/L (ref 15–37)
BASOPHILS # BLD: 0.1 K/UL (ref 0–0.2)
BASOPHILS NFR BLD: 0.5 %
BILIRUB SERPL-MCNC: <0.2 MG/DL (ref 0–1)
BUN SERPL-MCNC: 9 MG/DL (ref 7–20)
CALCIUM SERPL-MCNC: 9.2 MG/DL (ref 8.3–10.6)
CHLORIDE SERPL-SCNC: 98 MMOL/L (ref 99–110)
CO2 SERPL-SCNC: 27 MMOL/L (ref 21–32)
CREAT SERPL-MCNC: 0.4 MG/DL (ref 0.6–1.1)
DEPRECATED RDW RBC AUTO: 16 % (ref 12.4–15.4)
EOSINOPHIL # BLD: 0 K/UL (ref 0–0.6)
EOSINOPHIL NFR BLD: 0.4 %
GFR SERPLBLD CREATININE-BSD FMLA CKD-EPI: >90 ML/MIN/{1.73_M2}
GLUCOSE SERPL-MCNC: 136 MG/DL (ref 70–99)
HCT VFR BLD AUTO: 31.1 % (ref 36–48)
HGB BLD-MCNC: 10.2 G/DL (ref 12–16)
LYMPHOCYTES # BLD: 0.8 K/UL (ref 1–5.1)
LYMPHOCYTES NFR BLD: 7.3 %
MAGNESIUM SERPL-MCNC: 2.58 MG/DL (ref 1.8–2.4)
MCH RBC QN AUTO: 27.6 PG (ref 26–34)
MCHC RBC AUTO-ENTMCNC: 32.9 G/DL (ref 31–36)
MCV RBC AUTO: 83.9 FL (ref 80–100)
MONOCYTES # BLD: 0.7 K/UL (ref 0–1.3)
MONOCYTES NFR BLD: 6.2 %
NEUTROPHILS # BLD: 9.5 K/UL (ref 1.7–7.7)
NEUTROPHILS NFR BLD: 85.6 %
PLATELET # BLD AUTO: 452 K/UL (ref 135–450)
PMV BLD AUTO: 7.6 FL (ref 5–10.5)
POTASSIUM SERPL-SCNC: 4.2 MMOL/L (ref 3.5–5.1)
PROT SERPL-MCNC: 7.2 G/DL (ref 6.4–8.2)
RBC # BLD AUTO: 3.71 M/UL (ref 4–5.2)
SODIUM SERPL-SCNC: 137 MMOL/L (ref 136–145)
WBC # BLD AUTO: 11.1 K/UL (ref 4–11)

## 2024-10-31 PROCEDURE — 6370000000 HC RX 637 (ALT 250 FOR IP): Performed by: SURGERY

## 2024-10-31 PROCEDURE — 6360000002 HC RX W HCPCS: Performed by: SURGERY

## 2024-10-31 PROCEDURE — 6360000002 HC RX W HCPCS: Performed by: STUDENT IN AN ORGANIZED HEALTH CARE EDUCATION/TRAINING PROGRAM

## 2024-10-31 PROCEDURE — 99232 SBSQ HOSP IP/OBS MODERATE 35: CPT | Performed by: INTERNAL MEDICINE

## 2024-10-31 PROCEDURE — 83735 ASSAY OF MAGNESIUM: CPT

## 2024-10-31 PROCEDURE — 2580000003 HC RX 258: Performed by: SURGERY

## 2024-10-31 PROCEDURE — 2580000003 HC RX 258: Performed by: NURSE ANESTHETIST, CERTIFIED REGISTERED

## 2024-10-31 PROCEDURE — 80053 COMPREHEN METABOLIC PANEL: CPT

## 2024-10-31 PROCEDURE — 87205 SMEAR GRAM STAIN: CPT

## 2024-10-31 PROCEDURE — 87077 CULTURE AEROBIC IDENTIFY: CPT

## 2024-10-31 PROCEDURE — 0DNW0ZZ RELEASE PERITONEUM, OPEN APPROACH: ICD-10-PCS | Performed by: SURGERY

## 2024-10-31 PROCEDURE — 0DTN0ZZ RESECTION OF SIGMOID COLON, OPEN APPROACH: ICD-10-PCS | Performed by: SURGERY

## 2024-10-31 PROCEDURE — A4217 STERILE WATER/SALINE, 500 ML: HCPCS | Performed by: SURGERY

## 2024-10-31 PROCEDURE — 87186 SC STD MICRODIL/AGAR DIL: CPT

## 2024-10-31 PROCEDURE — 6360000002 HC RX W HCPCS: Performed by: NURSE ANESTHETIST, CERTIFIED REGISTERED

## 2024-10-31 PROCEDURE — 3600000004 HC SURGERY LEVEL 4 BASE: Performed by: SURGERY

## 2024-10-31 PROCEDURE — C9290 INJ, BUPIVACAINE LIPOSOME: HCPCS | Performed by: SURGERY

## 2024-10-31 PROCEDURE — 87076 CULTURE ANAEROBE IDENT EACH: CPT

## 2024-10-31 PROCEDURE — 2580000003 HC RX 258: Performed by: NURSE PRACTITIONER

## 2024-10-31 PROCEDURE — 87070 CULTURE OTHR SPECIMN AEROBIC: CPT

## 2024-10-31 PROCEDURE — 2720000010 HC SURG SUPPLY STERILE: Performed by: SURGERY

## 2024-10-31 PROCEDURE — 88307 TISSUE EXAM BY PATHOLOGIST: CPT

## 2024-10-31 PROCEDURE — 1200000000 HC SEMI PRIVATE

## 2024-10-31 PROCEDURE — 7100000001 HC PACU RECOVERY - ADDTL 15 MIN: Performed by: SURGERY

## 2024-10-31 PROCEDURE — 7100000000 HC PACU RECOVERY - FIRST 15 MIN: Performed by: SURGERY

## 2024-10-31 PROCEDURE — 44143 PARTIAL REMOVAL OF COLON: CPT | Performed by: SURGERY

## 2024-10-31 PROCEDURE — 3600000014 HC SURGERY LEVEL 4 ADDTL 15MIN: Performed by: SURGERY

## 2024-10-31 PROCEDURE — 87075 CULTR BACTERIA EXCEPT BLOOD: CPT

## 2024-10-31 PROCEDURE — 3700000000 HC ANESTHESIA ATTENDED CARE: Performed by: SURGERY

## 2024-10-31 PROCEDURE — 2500000003 HC RX 250 WO HCPCS: Performed by: STUDENT IN AN ORGANIZED HEALTH CARE EDUCATION/TRAINING PROGRAM

## 2024-10-31 PROCEDURE — 2500000003 HC RX 250 WO HCPCS: Performed by: NURSE ANESTHETIST, CERTIFIED REGISTERED

## 2024-10-31 PROCEDURE — 2709999900 HC NON-CHARGEABLE SUPPLY: Performed by: SURGERY

## 2024-10-31 PROCEDURE — 3700000001 HC ADD 15 MINUTES (ANESTHESIA): Performed by: SURGERY

## 2024-10-31 PROCEDURE — 0W9G0ZX DRAINAGE OF PERITONEAL CAVITY, OPEN APPROACH, DIAGNOSTIC: ICD-10-PCS | Performed by: SURGERY

## 2024-10-31 PROCEDURE — 85025 COMPLETE CBC W/AUTO DIFF WBC: CPT

## 2024-10-31 PROCEDURE — 88305 TISSUE EXAM BY PATHOLOGIST: CPT

## 2024-10-31 PROCEDURE — 6370000000 HC RX 637 (ALT 250 FOR IP): Performed by: STUDENT IN AN ORGANIZED HEALTH CARE EDUCATION/TRAINING PROGRAM

## 2024-10-31 RX ORDER — DIPHENHYDRAMINE HYDROCHLORIDE 50 MG/ML
12.5 INJECTION INTRAMUSCULAR; INTRAVENOUS
Status: DISCONTINUED | OUTPATIENT
Start: 2024-10-31 | End: 2024-10-31 | Stop reason: HOSPADM

## 2024-10-31 RX ORDER — IPRATROPIUM BROMIDE AND ALBUTEROL SULFATE 2.5; .5 MG/3ML; MG/3ML
1 SOLUTION RESPIRATORY (INHALATION)
Status: DISCONTINUED | OUTPATIENT
Start: 2024-10-31 | End: 2024-10-31 | Stop reason: HOSPADM

## 2024-10-31 RX ORDER — MAGNESIUM HYDROXIDE 1200 MG/15ML
LIQUID ORAL CONTINUOUS PRN
Status: COMPLETED | OUTPATIENT
Start: 2024-10-31 | End: 2024-10-31

## 2024-10-31 RX ORDER — OXYCODONE HYDROCHLORIDE 5 MG/1
5 TABLET ORAL PRN
Status: DISCONTINUED | OUTPATIENT
Start: 2024-10-31 | End: 2024-10-31 | Stop reason: HOSPADM

## 2024-10-31 RX ORDER — FENTANYL CITRATE 50 UG/ML
INJECTION, SOLUTION INTRAMUSCULAR; INTRAVENOUS
Status: DISCONTINUED | OUTPATIENT
Start: 2024-10-31 | End: 2024-10-31 | Stop reason: SDUPTHER

## 2024-10-31 RX ORDER — DEXAMETHASONE SODIUM PHOSPHATE 4 MG/ML
INJECTION, SOLUTION INTRA-ARTICULAR; INTRALESIONAL; INTRAMUSCULAR; INTRAVENOUS; SOFT TISSUE
Status: DISCONTINUED | OUTPATIENT
Start: 2024-10-31 | End: 2024-10-31 | Stop reason: SDUPTHER

## 2024-10-31 RX ORDER — HYDRALAZINE HYDROCHLORIDE 20 MG/ML
10 INJECTION INTRAMUSCULAR; INTRAVENOUS
Status: DISCONTINUED | OUTPATIENT
Start: 2024-10-31 | End: 2024-10-31 | Stop reason: HOSPADM

## 2024-10-31 RX ORDER — SUCCINYLCHOLINE/SOD CL,ISO/PF 200MG/10ML
SYRINGE (ML) INTRAVENOUS
Status: DISCONTINUED | OUTPATIENT
Start: 2024-10-31 | End: 2024-10-31 | Stop reason: SDUPTHER

## 2024-10-31 RX ORDER — KETOROLAC TROMETHAMINE 30 MG/ML
15 INJECTION, SOLUTION INTRAMUSCULAR; INTRAVENOUS EVERY 6 HOURS PRN
Status: DISPENSED | OUTPATIENT
Start: 2024-10-31 | End: 2024-11-05

## 2024-10-31 RX ORDER — METOCLOPRAMIDE HYDROCHLORIDE 5 MG/ML
10 INJECTION INTRAMUSCULAR; INTRAVENOUS EVERY 6 HOURS
Status: DISCONTINUED | OUTPATIENT
Start: 2024-10-31 | End: 2024-11-01

## 2024-10-31 RX ORDER — HYDROMORPHONE HYDROCHLORIDE 2 MG/ML
INJECTION, SOLUTION INTRAMUSCULAR; INTRAVENOUS; SUBCUTANEOUS
Status: DISCONTINUED | OUTPATIENT
Start: 2024-10-31 | End: 2024-10-31 | Stop reason: SDUPTHER

## 2024-10-31 RX ORDER — OXYCODONE HYDROCHLORIDE 5 MG/1
10 TABLET ORAL PRN
Status: DISCONTINUED | OUTPATIENT
Start: 2024-10-31 | End: 2024-10-31 | Stop reason: HOSPADM

## 2024-10-31 RX ORDER — ROCURONIUM BROMIDE 10 MG/ML
INJECTION, SOLUTION INTRAVENOUS
Status: DISCONTINUED | OUTPATIENT
Start: 2024-10-31 | End: 2024-10-31 | Stop reason: SDUPTHER

## 2024-10-31 RX ORDER — ONDANSETRON 2 MG/ML
INJECTION INTRAMUSCULAR; INTRAVENOUS
Status: DISCONTINUED | OUTPATIENT
Start: 2024-10-31 | End: 2024-10-31 | Stop reason: SDUPTHER

## 2024-10-31 RX ORDER — MORPHINE SULFATE 2 MG/ML
2 INJECTION, SOLUTION INTRAMUSCULAR; INTRAVENOUS
Status: DISCONTINUED | OUTPATIENT
Start: 2024-10-31 | End: 2024-11-10 | Stop reason: HOSPADM

## 2024-10-31 RX ORDER — ONDANSETRON 2 MG/ML
4 INJECTION INTRAMUSCULAR; INTRAVENOUS
Status: DISCONTINUED | OUTPATIENT
Start: 2024-10-31 | End: 2024-10-31 | Stop reason: HOSPADM

## 2024-10-31 RX ORDER — SODIUM CHLORIDE 9 MG/ML
INJECTION, SOLUTION INTRAVENOUS PRN
Status: DISCONTINUED | OUTPATIENT
Start: 2024-10-31 | End: 2024-10-31 | Stop reason: HOSPADM

## 2024-10-31 RX ORDER — NALOXONE HYDROCHLORIDE 0.4 MG/ML
INJECTION, SOLUTION INTRAMUSCULAR; INTRAVENOUS; SUBCUTANEOUS PRN
Status: DISCONTINUED | OUTPATIENT
Start: 2024-10-31 | End: 2024-10-31 | Stop reason: HOSPADM

## 2024-10-31 RX ORDER — ACETAMINOPHEN 500 MG
1000 TABLET ORAL EVERY 8 HOURS SCHEDULED
Status: COMPLETED | OUTPATIENT
Start: 2024-10-31 | End: 2024-11-03

## 2024-10-31 RX ORDER — SODIUM CHLORIDE 0.9 % (FLUSH) 0.9 %
5-40 SYRINGE (ML) INJECTION PRN
Status: DISCONTINUED | OUTPATIENT
Start: 2024-10-31 | End: 2024-10-31 | Stop reason: HOSPADM

## 2024-10-31 RX ORDER — SODIUM CHLORIDE 9 MG/ML
INJECTION, SOLUTION INTRAVENOUS
Status: DISCONTINUED | OUTPATIENT
Start: 2024-10-31 | End: 2024-10-31 | Stop reason: SDUPTHER

## 2024-10-31 RX ORDER — LABETALOL HYDROCHLORIDE 5 MG/ML
10 INJECTION, SOLUTION INTRAVENOUS
Status: DISCONTINUED | OUTPATIENT
Start: 2024-10-31 | End: 2024-10-31 | Stop reason: HOSPADM

## 2024-10-31 RX ORDER — HYDROMORPHONE HYDROCHLORIDE 2 MG/ML
0.5 INJECTION, SOLUTION INTRAMUSCULAR; INTRAVENOUS; SUBCUTANEOUS EVERY 5 MIN PRN
Status: DISCONTINUED | OUTPATIENT
Start: 2024-10-31 | End: 2024-10-31 | Stop reason: HOSPADM

## 2024-10-31 RX ORDER — GLYCOPYRROLATE 0.2 MG/ML
INJECTION INTRAMUSCULAR; INTRAVENOUS
Status: DISCONTINUED | OUTPATIENT
Start: 2024-10-31 | End: 2024-10-31 | Stop reason: SDUPTHER

## 2024-10-31 RX ORDER — SODIUM CHLORIDE 0.9 % (FLUSH) 0.9 %
5-40 SYRINGE (ML) INJECTION EVERY 12 HOURS SCHEDULED
Status: DISCONTINUED | OUTPATIENT
Start: 2024-10-31 | End: 2024-10-31 | Stop reason: HOSPADM

## 2024-10-31 RX ORDER — MORPHINE SULFATE 4 MG/ML
4 INJECTION, SOLUTION INTRAMUSCULAR; INTRAVENOUS
Status: DISCONTINUED | OUTPATIENT
Start: 2024-10-31 | End: 2024-11-07

## 2024-10-31 RX ORDER — HALOPERIDOL 5 MG/ML
1 INJECTION INTRAMUSCULAR
Status: DISCONTINUED | OUTPATIENT
Start: 2024-10-31 | End: 2024-10-31 | Stop reason: HOSPADM

## 2024-10-31 RX ORDER — HYDROMORPHONE HYDROCHLORIDE 2 MG/ML
0.25 INJECTION, SOLUTION INTRAMUSCULAR; INTRAVENOUS; SUBCUTANEOUS EVERY 5 MIN PRN
Status: DISCONTINUED | OUTPATIENT
Start: 2024-10-31 | End: 2024-10-31 | Stop reason: HOSPADM

## 2024-10-31 RX ORDER — LIDOCAINE HYDROCHLORIDE 20 MG/ML
INJECTION, SOLUTION EPIDURAL; INFILTRATION; INTRACAUDAL; PERINEURAL
Status: DISCONTINUED | OUTPATIENT
Start: 2024-10-31 | End: 2024-10-31 | Stop reason: SDUPTHER

## 2024-10-31 RX ORDER — PROPOFOL 10 MG/ML
INJECTION, EMULSION INTRAVENOUS
Status: DISCONTINUED | OUTPATIENT
Start: 2024-10-31 | End: 2024-10-31 | Stop reason: SDUPTHER

## 2024-10-31 RX ADMIN — Medication 20 MG: at 17:11

## 2024-10-31 RX ADMIN — KETOROLAC TROMETHAMINE 15 MG: 30 INJECTION, SOLUTION INTRAMUSCULAR at 19:57

## 2024-10-31 RX ADMIN — Medication 1 CAPSULE: at 09:38

## 2024-10-31 RX ADMIN — LIDOCAINE HYDROCHLORIDE 100 MG: 20 INJECTION, SOLUTION EPIDURAL; INFILTRATION; INTRACAUDAL; PERINEURAL at 15:39

## 2024-10-31 RX ADMIN — Medication 100 MG: at 15:39

## 2024-10-31 RX ADMIN — PROPOFOL 160 MG: 10 INJECTION, EMULSION INTRAVENOUS at 15:39

## 2024-10-31 RX ADMIN — PHENYLEPHRINE HYDROCHLORIDE 100 MCG: 10 INJECTION INTRAVENOUS at 18:27

## 2024-10-31 RX ADMIN — METOCLOPRAMIDE HYDROCHLORIDE 10 MG: 5 INJECTION, SOLUTION INTRAMUSCULAR; INTRAVENOUS at 21:55

## 2024-10-31 RX ADMIN — FENTANYL CITRATE 50 MCG: 50 INJECTION, SOLUTION INTRAMUSCULAR; INTRAVENOUS at 15:40

## 2024-10-31 RX ADMIN — HYDROMORPHONE HYDROCHLORIDE 0.8 MG: 2 INJECTION, SOLUTION INTRAMUSCULAR; INTRAVENOUS; SUBCUTANEOUS at 16:41

## 2024-10-31 RX ADMIN — ROCURONIUM BROMIDE 10 MG: 10 INJECTION, SOLUTION INTRAVENOUS at 17:30

## 2024-10-31 RX ADMIN — DEXAMETHASONE SODIUM PHOSPHATE 8 MG: 4 INJECTION, SOLUTION INTRAMUSCULAR; INTRAVENOUS at 15:51

## 2024-10-31 RX ADMIN — PHENYLEPHRINE HYDROCHLORIDE 100 MCG: 10 INJECTION INTRAVENOUS at 17:45

## 2024-10-31 RX ADMIN — GABAPENTIN 100 MG: 100 CAPSULE ORAL at 21:55

## 2024-10-31 RX ADMIN — MEROPENEM 1000 MG: 1 INJECTION INTRAVENOUS at 00:36

## 2024-10-31 RX ADMIN — HYDROMORPHONE HYDROCHLORIDE 0.25 MG: 2 INJECTION, SOLUTION INTRAMUSCULAR; INTRAVENOUS; SUBCUTANEOUS at 20:05

## 2024-10-31 RX ADMIN — PHENYLEPHRINE HYDROCHLORIDE 100 MCG: 10 INJECTION INTRAVENOUS at 18:06

## 2024-10-31 RX ADMIN — ROCURONIUM BROMIDE 10 MG: 10 INJECTION, SOLUTION INTRAVENOUS at 15:39

## 2024-10-31 RX ADMIN — ROCURONIUM BROMIDE 20 MG: 10 INJECTION, SOLUTION INTRAVENOUS at 15:42

## 2024-10-31 RX ADMIN — ONDANSETRON 4 MG: 2 INJECTION INTRAMUSCULAR; INTRAVENOUS at 18:26

## 2024-10-31 RX ADMIN — SODIUM CHLORIDE: 9 INJECTION, SOLUTION INTRAVENOUS at 15:32

## 2024-10-31 RX ADMIN — PHENYLEPHRINE HYDROCHLORIDE 100 MCG: 10 INJECTION INTRAVENOUS at 15:45

## 2024-10-31 RX ADMIN — ROCURONIUM BROMIDE 10 MG: 10 INJECTION, SOLUTION INTRAVENOUS at 16:11

## 2024-10-31 RX ADMIN — PHENYLEPHRINE HYDROCHLORIDE 100 MCG: 10 INJECTION INTRAVENOUS at 16:47

## 2024-10-31 RX ADMIN — HYDROMORPHONE HYDROCHLORIDE 0.2 MG: 2 INJECTION, SOLUTION INTRAMUSCULAR; INTRAVENOUS; SUBCUTANEOUS at 18:15

## 2024-10-31 RX ADMIN — Medication 10 ML: at 22:04

## 2024-10-31 RX ADMIN — PHENYLEPHRINE HYDROCHLORIDE 100 MCG: 10 INJECTION INTRAVENOUS at 15:54

## 2024-10-31 RX ADMIN — ROCURONIUM BROMIDE 10 MG: 10 INJECTION, SOLUTION INTRAVENOUS at 16:17

## 2024-10-31 RX ADMIN — FLUCONAZOLE 400 MG: 400 INJECTION, SOLUTION INTRAVENOUS at 14:51

## 2024-10-31 RX ADMIN — ACETAMINOPHEN 1000 MG: 500 TABLET ORAL at 21:54

## 2024-10-31 RX ADMIN — Medication 10 ML: at 09:38

## 2024-10-31 RX ADMIN — SUGAMMADEX 200 MG: 100 INJECTION, SOLUTION INTRAVENOUS at 19:08

## 2024-10-31 RX ADMIN — Medication 30 MG: at 15:42

## 2024-10-31 RX ADMIN — MEROPENEM 1000 MG: 1 INJECTION INTRAVENOUS at 09:44

## 2024-10-31 RX ADMIN — GLYCOPYRROLATE 0.2 MG: 0.2 INJECTION, SOLUTION INTRAMUSCULAR; INTRAVENOUS at 16:10

## 2024-10-31 RX ADMIN — HYDROMORPHONE HYDROCHLORIDE 0.2 MG: 2 INJECTION, SOLUTION INTRAMUSCULAR; INTRAVENOUS; SUBCUTANEOUS at 18:49

## 2024-10-31 RX ADMIN — SODIUM CHLORIDE: 9 INJECTION, SOLUTION INTRAVENOUS at 18:40

## 2024-10-31 RX ADMIN — ROCURONIUM BROMIDE 10 MG: 10 INJECTION, SOLUTION INTRAVENOUS at 18:15

## 2024-10-31 RX ADMIN — FENTANYL CITRATE 50 MCG: 50 INJECTION, SOLUTION INTRAMUSCULAR; INTRAVENOUS at 16:15

## 2024-10-31 RX ADMIN — ROCURONIUM BROMIDE 10 MG: 10 INJECTION, SOLUTION INTRAVENOUS at 17:00

## 2024-10-31 RX ADMIN — MORPHINE SULFATE 2 MG: 2 INJECTION, SOLUTION INTRAMUSCULAR; INTRAVENOUS at 21:57

## 2024-10-31 RX ADMIN — HYDROMORPHONE HYDROCHLORIDE 0.5 MG: 2 INJECTION, SOLUTION INTRAMUSCULAR; INTRAVENOUS; SUBCUTANEOUS at 20:15

## 2024-10-31 ASSESSMENT — PAIN DESCRIPTION - LOCATION
LOCATION: ABDOMEN

## 2024-10-31 ASSESSMENT — PAIN SCALES - GENERAL
PAINLEVEL_OUTOF10: 10
PAINLEVEL_OUTOF10: 10
PAINLEVEL_OUTOF10: 6
PAINLEVEL_OUTOF10: 6
PAINLEVEL_OUTOF10: 7
PAINLEVEL_OUTOF10: 0
PAINLEVEL_OUTOF10: 0
PAINLEVEL_OUTOF10: 6
PAINLEVEL_OUTOF10: 6
PAINLEVEL_OUTOF10: 0

## 2024-10-31 ASSESSMENT — PAIN - FUNCTIONAL ASSESSMENT
PAIN_FUNCTIONAL_ASSESSMENT: PREVENTS OR INTERFERES SOME ACTIVE ACTIVITIES AND ADLS
PAIN_FUNCTIONAL_ASSESSMENT: 0-10
PAIN_FUNCTIONAL_ASSESSMENT: PREVENTS OR INTERFERES SOME ACTIVE ACTIVITIES AND ADLS

## 2024-10-31 ASSESSMENT — PAIN DESCRIPTION - FREQUENCY
FREQUENCY: CONTINUOUS
FREQUENCY: CONTINUOUS

## 2024-10-31 ASSESSMENT — PAIN DESCRIPTION - DESCRIPTORS
DESCRIPTORS: ACHING
DESCRIPTORS: ACHING

## 2024-10-31 ASSESSMENT — PAIN DESCRIPTION - PAIN TYPE
TYPE: SURGICAL PAIN

## 2024-10-31 ASSESSMENT — PAIN DESCRIPTION - ORIENTATION
ORIENTATION: MID
ORIENTATION: MID

## 2024-10-31 ASSESSMENT — PAIN DESCRIPTION - ONSET
ONSET: ON-GOING
ONSET: ON-GOING

## 2024-10-31 ASSESSMENT — ENCOUNTER SYMPTOMS: SHORTNESS OF BREATH: 1

## 2024-10-31 NOTE — BRIEF OP NOTE
Brief Postoperative Note      Patient: Stephani Reyes  YOB: 1970  MRN: [id number]    Date of Procedure: 10/31/2024    Pre-Op Diagnosis Codes:      * Intra-abdominal abscess (HCC) [K65.1]    Post-Op Diagnosis: Same and perforated rectosigmoid colon       Procedure(s):  EXPLORATORY LAPAROTOMY  COLON RESECTION, DRAINAGE OF INTRA ABDOMINAL ABSCESS,  COLOSTOMY CREATION    Surgeon(s):  Iam Nolasco MD    Assistant:  Surgical Assistant: Kenia Bolanos    Anesthesia: General    Estimated Blood Loss (mL): 200     Complications: None    Specimens:   ID Type Source Tests Collected by Time Destination   1 : 1) Intra-Abdominal Abscess for Culture - Aerobic and Anaerobic Specimen Abdomen CULTURE, SURGICAL Iam Nolasco MD 10/31/2024 1630    2 : 2) Intra-abdominal abcess 2 for culture - aerobic and anaerobic Specimen Abdomen CULTURE, SURGICAL, CULTURE, ANAEROBIC AND AEROBIC Iam Nolasco MD 10/31/2024 1643    A : A) Sigmoid Colon Tissue Tissue SURGICAL PATHOLOGY Iam Nolasco MD 10/31/2024 1746    B : B) SMALL BOWEL NODULES Tissue Tissue SURGICAL PATHOLOGY Iam Nolasco MD 10/31/2024 1827        Implants:  * No implants in log *      Drains:   Closed/Suction Drain Right RLQ Bulb (Active)       Colostomy LLQ (Active)       Urinary Catheter 10/31/24 Liriano (Active)       Findings:  Infection Present At Time Of Surgery (PATOS) (choose all levels that have infection present):  - Organ Space infection (below fascia) present as evidenced by abscess, pus, purulent fluid, and fluid consistent with infection  Other Findings: Perforated rectosigmoid colon resulting in intraabdominal abscesses. Dense sb adhesions throughout taken down, abscesses drained, Heranndez procedure completed. SQ / Skin wound packed open.      Electronically signed by Iam Nolasco MD on 10/31/2024 at 7:11 PM

## 2024-10-31 NOTE — ANESTHESIA POSTPROCEDURE EVALUATION
Department of Anesthesiology  Postprocedure Note    Patient: Stephani Reyes  MRN: [id number]  YOB: 1970  Date of evaluation: 10/31/2024    Procedure Summary       Date: 10/31/24 Room / Location: [address]    Anesthesia Start: 1532 Anesthesia Stop: 1920    Procedures:       EXPLORATORY LAPAROTOMY (Abdomen)      COLON RESECTION, DRAINAGE OF INTRA ABDOMINAL ABSCESS, (Abdomen)      COLOSTOMY CREATION (Abdomen) Diagnosis:       Intra-abdominal abscess (HCC)      (Intra-abdominal abscess (HCC) [K65.1])    Surgeons: Iam Nolasco MD Responsible Provider: Urban Bolton MD    Anesthesia Type: general ASA Status: 4            Anesthesia Type: No value filed.    Katarina Phase I: Katarina Score: 10    Katarina Phase II:      Anesthesia Post Evaluation    Patient location during evaluation: PACU  Patient participation: complete - patient participated  Level of consciousness: awake and alert  Pain score: 3  Airway patency: patent  Nausea & Vomiting: no nausea  Cardiovascular status: blood pressure returned to baseline  Respiratory status: acceptable  Hydration status: euvolemic  Pain management: adequate    No notable events documented.

## 2024-10-31 NOTE — PLAN OF CARE
Problem: Pain  Goal: Verbalizes/displays adequate comfort level or baseline comfort level  10/31/2024 1358 by Chrissy Alberto RN  Outcome: Progressing  Flowsheets (Taken 10/31/2024 0930)  Verbalizes/displays adequate comfort level or baseline comfort level: Encourage patient to monitor pain and request assistance  10/31/2024 0640 by Georgi Uriostegui RN  Outcome: Progressing     Problem: Safety - Adult  Goal: Free from fall injury  10/31/2024 1358 by Chrissy Alberto RN  Outcome: Progressing  10/31/2024 0640 by Georgi Uriostegui RN  Outcome: Progressing     Problem: Cardiovascular - Adult  Goal: Absence of cardiac dysrhythmias or at baseline  10/31/2024 1358 by Chrissy Alberto RN  Outcome: Progressing  10/31/2024 0640 by Georgi Uriostegui RN  Outcome: Progressing     Problem: Gastrointestinal - Adult  Goal: Minimal or absence of nausea and vomiting  10/31/2024 0640 by Georgi Uriostegui, RN  Outcome: Progressing  Goal: Maintains adequate nutritional intake  10/31/2024 0640 by Georgi Uriostegui, RN  Outcome: Progressing

## 2024-11-01 PROBLEM — D72.825 BANDEMIA: Status: ACTIVE | Noted: 2024-11-01

## 2024-11-01 LAB
ALBUMIN SERPL-MCNC: 3.2 G/DL (ref 3.4–5)
ALBUMIN/GLOB SERPL: 0.8 {RATIO} (ref 1.1–2.2)
ALP SERPL-CCNC: 124 U/L (ref 40–129)
ALT SERPL-CCNC: 9 U/L (ref 10–40)
ANION GAP SERPL CALCULATED.3IONS-SCNC: 15 MMOL/L (ref 3–16)
ANION GAP SERPL CALCULATED.3IONS-SCNC: 22 MMOL/L (ref 3–16)
AST SERPL-CCNC: 18 U/L (ref 15–37)
BASOPHILS # BLD: 0 K/UL (ref 0–0.2)
BASOPHILS NFR BLD: 0.1 %
BILIRUB SERPL-MCNC: <0.2 MG/DL (ref 0–1)
BUN SERPL-MCNC: 27 MG/DL (ref 7–20)
BUN SERPL-MCNC: 36 MG/DL (ref 7–20)
CALCIUM SERPL-MCNC: 8.5 MG/DL (ref 8.3–10.6)
CALCIUM SERPL-MCNC: 8.9 MG/DL (ref 8.3–10.6)
CHLORIDE SERPL-SCNC: 102 MMOL/L (ref 99–110)
CHLORIDE SERPL-SCNC: 99 MMOL/L (ref 99–110)
CO2 SERPL-SCNC: 17 MMOL/L (ref 21–32)
CO2 SERPL-SCNC: 21 MMOL/L (ref 21–32)
CREAT SERPL-MCNC: 1.3 MG/DL (ref 0.6–1.1)
CREAT SERPL-MCNC: 1.4 MG/DL (ref 0.6–1.1)
DEPRECATED RDW RBC AUTO: 16.6 % (ref 12.4–15.4)
EOSINOPHIL # BLD: 0 K/UL (ref 0–0.6)
EOSINOPHIL NFR BLD: 0.1 %
GFR SERPLBLD CREATININE-BSD FMLA CKD-EPI: 45 ML/MIN/{1.73_M2}
GFR SERPLBLD CREATININE-BSD FMLA CKD-EPI: 49 ML/MIN/{1.73_M2}
GLUCOSE BLD-MCNC: 131 MG/DL (ref 70–99)
GLUCOSE SERPL-MCNC: 153 MG/DL (ref 70–99)
GLUCOSE SERPL-MCNC: 154 MG/DL (ref 70–99)
HCT VFR BLD AUTO: 35.9 % (ref 36–48)
HGB BLD-MCNC: 11.5 G/DL (ref 12–16)
LYMPHOCYTES # BLD: 0.6 K/UL (ref 1–5.1)
LYMPHOCYTES NFR BLD: 2 %
MAGNESIUM SERPL-MCNC: 2.28 MG/DL (ref 1.8–2.4)
MCH RBC QN AUTO: 27.6 PG (ref 26–34)
MCHC RBC AUTO-ENTMCNC: 32.2 G/DL (ref 31–36)
MCV RBC AUTO: 85.8 FL (ref 80–100)
MONOCYTES # BLD: 0.7 K/UL (ref 0–1.3)
MONOCYTES NFR BLD: 2.4 %
NEUTROPHILS # BLD: 28.4 K/UL (ref 1.7–7.7)
NEUTROPHILS NFR BLD: 95.4 %
PERFORMED ON: ABNORMAL
PLATELET # BLD AUTO: 522 K/UL (ref 135–450)
PMV BLD AUTO: 7.9 FL (ref 5–10.5)
POTASSIUM SERPL-SCNC: 5.3 MMOL/L (ref 3.5–5.1)
POTASSIUM SERPL-SCNC: 5.6 MMOL/L (ref 3.5–5.1)
PROT SERPL-MCNC: 7.4 G/DL (ref 6.4–8.2)
RBC # BLD AUTO: 4.19 M/UL (ref 4–5.2)
SODIUM SERPL-SCNC: 138 MMOL/L (ref 136–145)
SODIUM SERPL-SCNC: 138 MMOL/L (ref 136–145)
WBC # BLD AUTO: 29.8 K/UL (ref 4–11)

## 2024-11-01 PROCEDURE — 6370000000 HC RX 637 (ALT 250 FOR IP): Performed by: SURGERY

## 2024-11-01 PROCEDURE — 97162 PT EVAL MOD COMPLEX 30 MIN: CPT

## 2024-11-01 PROCEDURE — 97530 THERAPEUTIC ACTIVITIES: CPT

## 2024-11-01 PROCEDURE — 6360000002 HC RX W HCPCS: Performed by: INTERNAL MEDICINE

## 2024-11-01 PROCEDURE — 97535 SELF CARE MNGMENT TRAINING: CPT

## 2024-11-01 PROCEDURE — 93005 ELECTROCARDIOGRAM TRACING: CPT | Performed by: STUDENT IN AN ORGANIZED HEALTH CARE EDUCATION/TRAINING PROGRAM

## 2024-11-01 PROCEDURE — 2580000003 HC RX 258: Performed by: SURGERY

## 2024-11-01 PROCEDURE — 80053 COMPREHEN METABOLIC PANEL: CPT

## 2024-11-01 PROCEDURE — 2580000003 HC RX 258: Performed by: STUDENT IN AN ORGANIZED HEALTH CARE EDUCATION/TRAINING PROGRAM

## 2024-11-01 PROCEDURE — APPSS15 APP SPLIT SHARED TIME 0-15 MINUTES: Performed by: NURSE PRACTITIONER

## 2024-11-01 PROCEDURE — 97116 GAIT TRAINING THERAPY: CPT

## 2024-11-01 PROCEDURE — 85025 COMPLETE CBC W/AUTO DIFF WBC: CPT

## 2024-11-01 PROCEDURE — 99024 POSTOP FOLLOW-UP VISIT: CPT | Performed by: SURGERY

## 2024-11-01 PROCEDURE — 6360000002 HC RX W HCPCS: Performed by: STUDENT IN AN ORGANIZED HEALTH CARE EDUCATION/TRAINING PROGRAM

## 2024-11-01 PROCEDURE — APPNB30 APP NON BILLABLE TIME 0-30 MINS: Performed by: NURSE PRACTITIONER

## 2024-11-01 PROCEDURE — 6360000002 HC RX W HCPCS: Performed by: SURGERY

## 2024-11-01 PROCEDURE — 97166 OT EVAL MOD COMPLEX 45 MIN: CPT

## 2024-11-01 PROCEDURE — 1200000000 HC SEMI PRIVATE

## 2024-11-01 PROCEDURE — 99233 SBSQ HOSP IP/OBS HIGH 50: CPT | Performed by: INTERNAL MEDICINE

## 2024-11-01 PROCEDURE — 83735 ASSAY OF MAGNESIUM: CPT

## 2024-11-01 RX ORDER — SODIUM CHLORIDE 9 MG/ML
INJECTION, SOLUTION INTRAVENOUS CONTINUOUS
Status: DISCONTINUED | OUTPATIENT
Start: 2024-11-01 | End: 2024-11-07

## 2024-11-01 RX ORDER — METOCLOPRAMIDE HYDROCHLORIDE 5 MG/ML
5 INJECTION INTRAMUSCULAR; INTRAVENOUS EVERY 6 HOURS
Status: COMPLETED | OUTPATIENT
Start: 2024-11-01 | End: 2024-11-02

## 2024-11-01 RX ORDER — LINEZOLID 2 MG/ML
600 INJECTION, SOLUTION INTRAVENOUS EVERY 12 HOURS
Status: DISCONTINUED | OUTPATIENT
Start: 2024-11-01 | End: 2024-11-04

## 2024-11-01 RX ADMIN — METOCLOPRAMIDE HYDROCHLORIDE 10 MG: 5 INJECTION, SOLUTION INTRAMUSCULAR; INTRAVENOUS at 03:21

## 2024-11-01 RX ADMIN — Medication 1 CAPSULE: at 15:59

## 2024-11-01 RX ADMIN — METOCLOPRAMIDE HYDROCHLORIDE 5 MG: 5 INJECTION, SOLUTION INTRAMUSCULAR; INTRAVENOUS at 20:40

## 2024-11-01 RX ADMIN — Medication 1 CAPSULE: at 08:35

## 2024-11-01 RX ADMIN — ACETAMINOPHEN 1000 MG: 500 TABLET ORAL at 15:59

## 2024-11-01 RX ADMIN — METOCLOPRAMIDE HYDROCHLORIDE 10 MG: 5 INJECTION, SOLUTION INTRAMUSCULAR; INTRAVENOUS at 08:36

## 2024-11-01 RX ADMIN — MEROPENEM 1000 MG: 1 INJECTION INTRAVENOUS at 00:51

## 2024-11-01 RX ADMIN — METOCLOPRAMIDE HYDROCHLORIDE 5 MG: 5 INJECTION, SOLUTION INTRAMUSCULAR; INTRAVENOUS at 15:59

## 2024-11-01 RX ADMIN — MORPHINE SULFATE 4 MG: 4 INJECTION, SOLUTION INTRAMUSCULAR; INTRAVENOUS at 22:33

## 2024-11-01 RX ADMIN — Medication 10 ML: at 20:44

## 2024-11-01 RX ADMIN — MEROPENEM 1000 MG: 1 INJECTION INTRAVENOUS at 20:44

## 2024-11-01 RX ADMIN — MORPHINE SULFATE 4 MG: 4 INJECTION, SOLUTION INTRAMUSCULAR; INTRAVENOUS at 01:01

## 2024-11-01 RX ADMIN — ACETAMINOPHEN 1000 MG: 500 TABLET ORAL at 22:35

## 2024-11-01 RX ADMIN — MEROPENEM 1000 MG: 1 INJECTION INTRAVENOUS at 08:46

## 2024-11-01 RX ADMIN — LINEZOLID 600 MG: 600 INJECTION, SOLUTION INTRAVENOUS at 15:56

## 2024-11-01 RX ADMIN — FLUCONAZOLE 400 MG: 400 INJECTION, SOLUTION INTRAVENOUS at 15:57

## 2024-11-01 RX ADMIN — GABAPENTIN 100 MG: 100 CAPSULE ORAL at 15:59

## 2024-11-01 RX ADMIN — ACETAMINOPHEN 1000 MG: 500 TABLET ORAL at 05:09

## 2024-11-01 RX ADMIN — GABAPENTIN 100 MG: 100 CAPSULE ORAL at 22:35

## 2024-11-01 ASSESSMENT — PAIN DESCRIPTION - ORIENTATION
ORIENTATION: MID

## 2024-11-01 ASSESSMENT — PAIN DESCRIPTION - LOCATION
LOCATION: ABDOMEN

## 2024-11-01 ASSESSMENT — PAIN DESCRIPTION - PAIN TYPE
TYPE: SURGICAL PAIN

## 2024-11-01 ASSESSMENT — PAIN SCALES - GENERAL
PAINLEVEL_OUTOF10: 2
PAINLEVEL_OUTOF10: 0
PAINLEVEL_OUTOF10: 3
PAINLEVEL_OUTOF10: 7
PAINLEVEL_OUTOF10: 4
PAINLEVEL_OUTOF10: 6
PAINLEVEL_OUTOF10: 3
PAINLEVEL_OUTOF10: 3
PAINLEVEL_OUTOF10: 8
PAINLEVEL_OUTOF10: 7

## 2024-11-01 ASSESSMENT — PAIN - FUNCTIONAL ASSESSMENT
PAIN_FUNCTIONAL_ASSESSMENT: PREVENTS OR INTERFERES SOME ACTIVE ACTIVITIES AND ADLS

## 2024-11-01 ASSESSMENT — PAIN DESCRIPTION - DESCRIPTORS
DESCRIPTORS: SORE;ACHING;DISCOMFORT
DESCRIPTORS: ACHING
DESCRIPTORS: ACHING

## 2024-11-01 ASSESSMENT — PAIN DESCRIPTION - FREQUENCY
FREQUENCY: CONTINUOUS

## 2024-11-01 ASSESSMENT — PAIN DESCRIPTION - ONSET
ONSET: ON-GOING

## 2024-11-01 NOTE — PLAN OF CARE
Problem: Pain  Goal: Verbalizes/displays adequate comfort level or baseline comfort level  10/31/2024 2138 by Meredith Rankin RN  Outcome: Progressing  10/31/2024 1358 by Chrissy Alberto RN  Outcome: Progressing  Flowsheets (Taken 10/31/2024 0930)  Verbalizes/displays adequate comfort level or baseline comfort level: Encourage patient to monitor pain and request assistance     Problem: Safety - Adult  Goal: Free from fall injury  10/31/2024 2138 by Meredith Rankin RN  Outcome: Progressing  10/31/2024 1358 by Chrissy Alberto RN  Outcome: Progressing     Problem: Cardiovascular - Adult  Goal: Absence of cardiac dysrhythmias or at baseline  10/31/2024 2138 by Meredith Rankin RN  Outcome: Progressing  10/31/2024 1358 by Chrissy Alberto RN  Outcome: Progressing  Flowsheets  Taken 10/31/2024 1358  Absence of cardiac dysrhythmias or at baseline: Monitor cardiac rate and rhythm  Taken 10/31/2024 0800  Absence of cardiac dysrhythmias or at baseline: Monitor cardiac rate and rhythm     Problem: Gastrointestinal - Adult  Goal: Minimal or absence of nausea and vomiting  Outcome: Progressing  Flowsheets (Taken 10/31/2024 0800 by Chrissy Alberto RN)  Minimal or absence of nausea and vomiting: Administer IV fluids as ordered to ensure adequate hydration  Goal: Maintains adequate nutritional intake  Outcome: Progressing  Flowsheets (Taken 10/31/2024 0800 by Chrissy Alberto RN)  Maintains adequate nutritional intake: Monitor percentage of each meal consumed     Problem: Nutrition Deficit:  Goal: Optimize nutritional status  Outcome: Progressing     Problem: Discharge Planning  Goal: Discharge to home or other facility with appropriate resources  Outcome: Progressing

## 2024-11-01 NOTE — DISCHARGE INSTRUCTIONS
15-20 minutes. If remains uncontrolled, either contact the office or present to nearest ED  - It is common to have bruising or mild redness around the wounds within the first few days after surgery. If it worsens, or starts draining, do not hesitate to contact the office  - May shower but no tub baths or swimming pools--do not submerge incisions under water    Cautions  - Watch for signs of infection, such as: fever over 100.5, excessive warmth or redness around incisions, bloody or cloudy drainage from incisions  - Watch for signs of complication: uncontrolled pain, nausea, bloating, sudden lightheadedness  - Serious problems can arise after surgery that need urgent or immediate care  - Do not hesitate to contact the office with any questions    Follow-up with your surgeon on Thursday 11/14/2024.  Call [phone] to schedule follow-up visit.        OSTOMY CARE: Plan for Ostomy Care:      Plan  Routine ostomy care -   Change pouch every 3 days & PRN. Empty when 1/3 to 1/2 full of stool or gas.      Supplies:  Coloplast wafer 00451, pouch [id number], barrier ring (optional) [id number]     Directions:  Measure stoma with every pouch change.  The swelling from surgery will decrease over the next four to six weeks.   -Empty and remove old pouch, cleanse skin with warm water, dry thoroughly . Place tissue over stoma  to catch stool, in case it is active;  -Measure stoma & cut wafer to fit snuggly around base of stoma   -Place wafer over stoma, press to skin  -remove adhesive ring paper cover, line up pouch inside of blue line on wafer. -Press wafer and pouch together for a good seal  -close end of pouch.  -place a warm blanket or hold your hand over pouch for 5 to 10 minutes. Heat will help with adhesion.

## 2024-11-02 LAB
ALBUMIN SERPL-MCNC: 2.8 G/DL (ref 3.4–5)
ALBUMIN/GLOB SERPL: 0.9 {RATIO} (ref 1.1–2.2)
ALP SERPL-CCNC: 83 U/L (ref 40–129)
ALT SERPL-CCNC: 7 U/L (ref 10–40)
ANION GAP SERPL CALCULATED.3IONS-SCNC: 7 MMOL/L (ref 3–16)
AST SERPL-CCNC: 15 U/L (ref 15–37)
BASOPHILS # BLD: 0 K/UL (ref 0–0.2)
BASOPHILS NFR BLD: 0.3 %
BILIRUB SERPL-MCNC: <0.2 MG/DL (ref 0–1)
BUN SERPL-MCNC: 28 MG/DL (ref 7–20)
CALCIUM SERPL-MCNC: 8.2 MG/DL (ref 8.3–10.6)
CHLORIDE SERPL-SCNC: 100 MMOL/L (ref 99–110)
CO2 SERPL-SCNC: 26 MMOL/L (ref 21–32)
CREAT SERPL-MCNC: 0.7 MG/DL (ref 0.6–1.1)
DEPRECATED RDW RBC AUTO: 16.6 % (ref 12.4–15.4)
EKG ATRIAL RATE: 79 BPM
EKG DIAGNOSIS: NORMAL
EKG P AXIS: 51 DEGREES
EKG P-R INTERVAL: 134 MS
EKG Q-T INTERVAL: 412 MS
EKG QRS DURATION: 76 MS
EKG QTC CALCULATION (BAZETT): 472 MS
EKG R AXIS: 54 DEGREES
EKG T AXIS: 32 DEGREES
EKG VENTRICULAR RATE: 79 BPM
EOSINOPHIL # BLD: 0 K/UL (ref 0–0.6)
EOSINOPHIL NFR BLD: 0.1 %
GFR SERPLBLD CREATININE-BSD FMLA CKD-EPI: >90 ML/MIN/{1.73_M2}
GLUCOSE BLD-MCNC: 102 MG/DL (ref 70–99)
GLUCOSE BLD-MCNC: 83 MG/DL (ref 70–99)
GLUCOSE SERPL-MCNC: 118 MG/DL (ref 70–99)
HCT VFR BLD AUTO: 25.8 % (ref 36–48)
HGB BLD-MCNC: 8.4 G/DL (ref 12–16)
LYMPHOCYTES # BLD: 0.8 K/UL (ref 1–5.1)
LYMPHOCYTES NFR BLD: 6.4 %
MAGNESIUM SERPL-MCNC: 2.23 MG/DL (ref 1.8–2.4)
MCH RBC QN AUTO: 27.4 PG (ref 26–34)
MCHC RBC AUTO-ENTMCNC: 32.6 G/DL (ref 31–36)
MCV RBC AUTO: 84.1 FL (ref 80–100)
MONOCYTES # BLD: 0.6 K/UL (ref 0–1.3)
MONOCYTES NFR BLD: 4.4 %
NEUTROPHILS # BLD: 11.3 K/UL (ref 1.7–7.7)
NEUTROPHILS NFR BLD: 88.8 %
PERFORMED ON: ABNORMAL
PERFORMED ON: NORMAL
PLATELET # BLD AUTO: 352 K/UL (ref 135–450)
PMV BLD AUTO: 7.7 FL (ref 5–10.5)
POTASSIUM SERPL-SCNC: 4.5 MMOL/L (ref 3.5–5.1)
PROT SERPL-MCNC: 6 G/DL (ref 6.4–8.2)
RBC # BLD AUTO: 3.07 M/UL (ref 4–5.2)
SODIUM SERPL-SCNC: 133 MMOL/L (ref 136–145)
WBC # BLD AUTO: 12.8 K/UL (ref 4–11)

## 2024-11-02 PROCEDURE — 2580000003 HC RX 258: Performed by: SURGERY

## 2024-11-02 PROCEDURE — 99024 POSTOP FOLLOW-UP VISIT: CPT | Performed by: SURGERY

## 2024-11-02 PROCEDURE — 85025 COMPLETE CBC W/AUTO DIFF WBC: CPT

## 2024-11-02 PROCEDURE — 6360000002 HC RX W HCPCS: Performed by: STUDENT IN AN ORGANIZED HEALTH CARE EDUCATION/TRAINING PROGRAM

## 2024-11-02 PROCEDURE — 6370000000 HC RX 637 (ALT 250 FOR IP): Performed by: SURGERY

## 2024-11-02 PROCEDURE — 6360000002 HC RX W HCPCS: Performed by: SURGERY

## 2024-11-02 PROCEDURE — 80053 COMPREHEN METABOLIC PANEL: CPT

## 2024-11-02 PROCEDURE — 1200000000 HC SEMI PRIVATE

## 2024-11-02 PROCEDURE — 93010 ELECTROCARDIOGRAM REPORT: CPT | Performed by: INTERNAL MEDICINE

## 2024-11-02 PROCEDURE — 6360000002 HC RX W HCPCS: Performed by: INTERNAL MEDICINE

## 2024-11-02 PROCEDURE — 2580000003 HC RX 258: Performed by: STUDENT IN AN ORGANIZED HEALTH CARE EDUCATION/TRAINING PROGRAM

## 2024-11-02 PROCEDURE — 83735 ASSAY OF MAGNESIUM: CPT

## 2024-11-02 RX ADMIN — MORPHINE SULFATE 4 MG: 4 INJECTION, SOLUTION INTRAMUSCULAR; INTRAVENOUS at 13:18

## 2024-11-02 RX ADMIN — METOCLOPRAMIDE HYDROCHLORIDE 5 MG: 5 INJECTION, SOLUTION INTRAMUSCULAR; INTRAVENOUS at 03:43

## 2024-11-02 RX ADMIN — MORPHINE SULFATE 4 MG: 4 INJECTION, SOLUTION INTRAMUSCULAR; INTRAVENOUS at 03:47

## 2024-11-02 RX ADMIN — LINEZOLID 600 MG: 600 INJECTION, SOLUTION INTRAVENOUS at 16:13

## 2024-11-02 RX ADMIN — FLUCONAZOLE 400 MG: 400 INJECTION, SOLUTION INTRAVENOUS at 15:25

## 2024-11-02 RX ADMIN — METOCLOPRAMIDE HYDROCHLORIDE 5 MG: 5 INJECTION, SOLUTION INTRAMUSCULAR; INTRAVENOUS at 09:18

## 2024-11-02 RX ADMIN — MEROPENEM 1000 MG: 1 INJECTION INTRAVENOUS at 20:29

## 2024-11-02 RX ADMIN — SODIUM CHLORIDE: 9 INJECTION, SOLUTION INTRAVENOUS at 09:18

## 2024-11-02 RX ADMIN — MORPHINE SULFATE 4 MG: 4 INJECTION, SOLUTION INTRAMUSCULAR; INTRAVENOUS at 20:22

## 2024-11-02 RX ADMIN — Medication 1 CAPSULE: at 09:18

## 2024-11-02 RX ADMIN — METOCLOPRAMIDE HYDROCHLORIDE 5 MG: 5 INJECTION, SOLUTION INTRAMUSCULAR; INTRAVENOUS at 16:10

## 2024-11-02 RX ADMIN — MEROPENEM 1000 MG: 1 INJECTION INTRAVENOUS at 09:24

## 2024-11-02 RX ADMIN — ACETAMINOPHEN 1000 MG: 500 TABLET ORAL at 15:22

## 2024-11-02 RX ADMIN — ACETAMINOPHEN 1000 MG: 500 TABLET ORAL at 06:50

## 2024-11-02 RX ADMIN — LINEZOLID 600 MG: 600 INJECTION, SOLUTION INTRAVENOUS at 03:56

## 2024-11-02 RX ADMIN — Medication 1 CAPSULE: at 16:14

## 2024-11-02 RX ADMIN — GABAPENTIN 100 MG: 100 CAPSULE ORAL at 09:18

## 2024-11-02 RX ADMIN — GABAPENTIN 100 MG: 100 CAPSULE ORAL at 15:22

## 2024-11-02 RX ADMIN — GABAPENTIN 100 MG: 100 CAPSULE ORAL at 20:25

## 2024-11-02 RX ADMIN — ACETAMINOPHEN 1000 MG: 500 TABLET ORAL at 20:25

## 2024-11-02 ASSESSMENT — PAIN SCALES - GENERAL
PAINLEVEL_OUTOF10: 0
PAINLEVEL_OUTOF10: 7
PAINLEVEL_OUTOF10: 7
PAINLEVEL_OUTOF10: 0
PAINLEVEL_OUTOF10: 7
PAINLEVEL_OUTOF10: 0
PAINLEVEL_OUTOF10: 2
PAINLEVEL_OUTOF10: 7
PAINLEVEL_OUTOF10: 4
PAINLEVEL_OUTOF10: 6
PAINLEVEL_OUTOF10: 0
PAINLEVEL_OUTOF10: 0
PAINLEVEL_OUTOF10: 7

## 2024-11-02 ASSESSMENT — PAIN DESCRIPTION - ORIENTATION
ORIENTATION: MID

## 2024-11-02 ASSESSMENT — PAIN DESCRIPTION - FREQUENCY: FREQUENCY: INTERMITTENT

## 2024-11-02 ASSESSMENT — PAIN DESCRIPTION - LOCATION
LOCATION: ABDOMEN

## 2024-11-02 ASSESSMENT — PAIN DESCRIPTION - PAIN TYPE
TYPE: SURGICAL PAIN
TYPE: SURGICAL PAIN

## 2024-11-02 ASSESSMENT — PAIN DESCRIPTION - DESCRIPTORS
DESCRIPTORS: ACHING;SORE
DESCRIPTORS: ACHING;SORE
DESCRIPTORS: DISCOMFORT

## 2024-11-02 NOTE — PLAN OF CARE
Problem: Pain  Goal: Verbalizes/displays adequate comfort level or baseline comfort level  Outcome: Progressing     Problem: Safety - Adult  Goal: Free from fall injury  Outcome: Progressing     Problem: Cardiovascular - Adult  Goal: Absence of cardiac dysrhythmias or at baseline  Outcome: Progressing     Problem: Gastrointestinal - Adult  Goal: Minimal or absence of nausea and vomiting  Outcome: Progressing  Goal: Maintains adequate nutritional intake  Outcome: Progressing     Problem: Nutrition Deficit:  Goal: Optimize nutritional status  Outcome: Progressing     Problem: Discharge Planning  Goal: Discharge to home or other facility with appropriate resources  Outcome: Progressing

## 2024-11-03 ENCOUNTER — APPOINTMENT (OUTPATIENT)
Dept: GENERAL RADIOLOGY | Age: 54
DRG: 853 | End: 2024-11-03
Payer: COMMERCIAL

## 2024-11-03 LAB
ALBUMIN SERPL-MCNC: 2.7 G/DL (ref 3.4–5)
ALBUMIN/GLOB SERPL: 0.8 {RATIO} (ref 1.1–2.2)
ALP SERPL-CCNC: 124 U/L (ref 40–129)
ALT SERPL-CCNC: <5 U/L (ref 10–40)
ANION GAP SERPL CALCULATED.3IONS-SCNC: 12 MMOL/L (ref 3–16)
AST SERPL-CCNC: 27 U/L (ref 15–37)
BASOPHILS # BLD: 0.1 K/UL (ref 0–0.2)
BASOPHILS NFR BLD: 0.5 %
BILIRUB SERPL-MCNC: <0.2 MG/DL (ref 0–1)
BUN SERPL-MCNC: 9 MG/DL (ref 7–20)
CALCIUM SERPL-MCNC: 8.4 MG/DL (ref 8.3–10.6)
CHLORIDE SERPL-SCNC: 98 MMOL/L (ref 99–110)
CO2 SERPL-SCNC: 26 MMOL/L (ref 21–32)
CREAT SERPL-MCNC: 0.4 MG/DL (ref 0.6–1.1)
DEPRECATED RDW RBC AUTO: 16.2 % (ref 12.4–15.4)
EOSINOPHIL # BLD: 0.1 K/UL (ref 0–0.6)
EOSINOPHIL NFR BLD: 0.8 %
GFR SERPLBLD CREATININE-BSD FMLA CKD-EPI: >90 ML/MIN/{1.73_M2}
GLUCOSE BLD-MCNC: 113 MG/DL (ref 70–99)
GLUCOSE BLD-MCNC: 88 MG/DL (ref 70–99)
GLUCOSE BLD-MCNC: 92 MG/DL (ref 70–99)
GLUCOSE SERPL-MCNC: 90 MG/DL (ref 70–99)
HCT VFR BLD AUTO: 24.8 % (ref 36–48)
HGB BLD-MCNC: 8.1 G/DL (ref 12–16)
LYMPHOCYTES # BLD: 0.9 K/UL (ref 1–5.1)
LYMPHOCYTES NFR BLD: 8.7 %
MAGNESIUM SERPL-MCNC: 1.98 MG/DL (ref 1.8–2.4)
MCH RBC QN AUTO: 27.8 PG (ref 26–34)
MCHC RBC AUTO-ENTMCNC: 32.7 G/DL (ref 31–36)
MCV RBC AUTO: 84.8 FL (ref 80–100)
MONOCYTES # BLD: 0.4 K/UL (ref 0–1.3)
MONOCYTES NFR BLD: 4.2 %
NEUTROPHILS # BLD: 8.9 K/UL (ref 1.7–7.7)
NEUTROPHILS NFR BLD: 85.8 %
PERFORMED ON: ABNORMAL
PERFORMED ON: NORMAL
PERFORMED ON: NORMAL
PLATELET # BLD AUTO: 337 K/UL (ref 135–450)
PMV BLD AUTO: 7.6 FL (ref 5–10.5)
POTASSIUM SERPL-SCNC: 3.7 MMOL/L (ref 3.5–5.1)
PROT SERPL-MCNC: 6.1 G/DL (ref 6.4–8.2)
RBC # BLD AUTO: 2.92 M/UL (ref 4–5.2)
SODIUM SERPL-SCNC: 136 MMOL/L (ref 136–145)
WBC # BLD AUTO: 10.4 K/UL (ref 4–11)

## 2024-11-03 PROCEDURE — 80053 COMPREHEN METABOLIC PANEL: CPT

## 2024-11-03 PROCEDURE — 2580000003 HC RX 258: Performed by: STUDENT IN AN ORGANIZED HEALTH CARE EDUCATION/TRAINING PROGRAM

## 2024-11-03 PROCEDURE — 2580000003 HC RX 258: Performed by: SURGERY

## 2024-11-03 PROCEDURE — 6360000002 HC RX W HCPCS: Performed by: STUDENT IN AN ORGANIZED HEALTH CARE EDUCATION/TRAINING PROGRAM

## 2024-11-03 PROCEDURE — 6360000002 HC RX W HCPCS: Performed by: SURGERY

## 2024-11-03 PROCEDURE — 36591 DRAW BLOOD OFF VENOUS DEVICE: CPT

## 2024-11-03 PROCEDURE — 6370000000 HC RX 637 (ALT 250 FOR IP): Performed by: SURGERY

## 2024-11-03 PROCEDURE — 74018 RADEX ABDOMEN 1 VIEW: CPT

## 2024-11-03 PROCEDURE — 99024 POSTOP FOLLOW-UP VISIT: CPT | Performed by: SURGERY

## 2024-11-03 PROCEDURE — 85025 COMPLETE CBC W/AUTO DIFF WBC: CPT

## 2024-11-03 PROCEDURE — 6360000002 HC RX W HCPCS: Performed by: INTERNAL MEDICINE

## 2024-11-03 PROCEDURE — 1200000000 HC SEMI PRIVATE

## 2024-11-03 PROCEDURE — 83735 ASSAY OF MAGNESIUM: CPT

## 2024-11-03 RX ADMIN — ACETAMINOPHEN 1000 MG: 500 TABLET ORAL at 13:42

## 2024-11-03 RX ADMIN — SODIUM CHLORIDE: 9 INJECTION, SOLUTION INTRAVENOUS at 08:16

## 2024-11-03 RX ADMIN — PIPERACILLIN SODIUM AND TAZOBACTAM SODIUM 3375 MG: 3; .375 INJECTION, SOLUTION INTRAVENOUS at 22:17

## 2024-11-03 RX ADMIN — LINEZOLID 600 MG: 600 INJECTION, SOLUTION INTRAVENOUS at 16:07

## 2024-11-03 RX ADMIN — ACETAMINOPHEN 1000 MG: 500 TABLET ORAL at 06:57

## 2024-11-03 RX ADMIN — LINEZOLID 600 MG: 600 INJECTION, SOLUTION INTRAVENOUS at 03:40

## 2024-11-03 RX ADMIN — MORPHINE SULFATE 4 MG: 4 INJECTION, SOLUTION INTRAMUSCULAR; INTRAVENOUS at 00:24

## 2024-11-03 RX ADMIN — FLUCONAZOLE 400 MG: 400 INJECTION, SOLUTION INTRAVENOUS at 13:45

## 2024-11-03 RX ADMIN — PIPERACILLIN SODIUM AND TAZOBACTAM SODIUM 3375 MG: 3; .375 INJECTION, SOLUTION INTRAVENOUS at 13:54

## 2024-11-03 RX ADMIN — MORPHINE SULFATE 4 MG: 4 INJECTION, SOLUTION INTRAMUSCULAR; INTRAVENOUS at 03:27

## 2024-11-03 RX ADMIN — MEROPENEM 1000 MG: 1 INJECTION INTRAVENOUS at 08:20

## 2024-11-03 RX ADMIN — Medication 1 CAPSULE: at 08:07

## 2024-11-03 RX ADMIN — Medication 1 CAPSULE: at 18:45

## 2024-11-03 RX ADMIN — Medication 10 ML: at 08:07

## 2024-11-03 RX ADMIN — ONDANSETRON 4 MG: 2 INJECTION, SOLUTION INTRAMUSCULAR; INTRAVENOUS at 23:59

## 2024-11-03 RX ADMIN — GABAPENTIN 100 MG: 100 CAPSULE ORAL at 20:29

## 2024-11-03 ASSESSMENT — PAIN SCALES - GENERAL
PAINLEVEL_OUTOF10: 3
PAINLEVEL_OUTOF10: 2
PAINLEVEL_OUTOF10: 8
PAINLEVEL_OUTOF10: 6
PAINLEVEL_OUTOF10: 6
PAINLEVEL_OUTOF10: 2
PAINLEVEL_OUTOF10: 0

## 2024-11-03 ASSESSMENT — PAIN DESCRIPTION - LOCATION
LOCATION: ABDOMEN

## 2024-11-03 ASSESSMENT — PAIN DESCRIPTION - ORIENTATION
ORIENTATION: MID
ORIENTATION: MID;UPPER
ORIENTATION: MID;UPPER

## 2024-11-03 ASSESSMENT — PAIN DESCRIPTION - DESCRIPTORS
DESCRIPTORS: ACHING
DESCRIPTORS: ACHING
DESCRIPTORS: ACHING;SORE

## 2024-11-04 LAB
ALBUMIN SERPL-MCNC: 3.1 G/DL (ref 3.4–5)
ALBUMIN/GLOB SERPL: 0.9 {RATIO} (ref 1.1–2.2)
ALP SERPL-CCNC: 84 U/L (ref 40–129)
ALT SERPL-CCNC: 7 U/L (ref 10–40)
ANION GAP SERPL CALCULATED.3IONS-SCNC: 11 MMOL/L (ref 3–16)
AST SERPL-CCNC: 14 U/L (ref 15–37)
BASOPHILS # BLD: 0 K/UL (ref 0–0.2)
BASOPHILS NFR BLD: 0.4 %
BILIRUB SERPL-MCNC: 0.3 MG/DL (ref 0–1)
BUN SERPL-MCNC: 6 MG/DL (ref 7–20)
CALCIUM SERPL-MCNC: 8.9 MG/DL (ref 8.3–10.6)
CHLORIDE SERPL-SCNC: 92 MMOL/L (ref 99–110)
CO2 SERPL-SCNC: 30 MMOL/L (ref 21–32)
CREAT SERPL-MCNC: 0.4 MG/DL (ref 0.6–1.1)
DEPRECATED RDW RBC AUTO: 15.6 % (ref 12.4–15.4)
EOSINOPHIL # BLD: 0.1 K/UL (ref 0–0.6)
EOSINOPHIL NFR BLD: 1.3 %
GFR SERPLBLD CREATININE-BSD FMLA CKD-EPI: >90 ML/MIN/{1.73_M2}
GLUCOSE BLD-MCNC: 101 MG/DL (ref 70–99)
GLUCOSE BLD-MCNC: 101 MG/DL (ref 70–99)
GLUCOSE BLD-MCNC: 83 MG/DL (ref 70–99)
GLUCOSE BLD-MCNC: 93 MG/DL (ref 70–99)
GLUCOSE SERPL-MCNC: 157 MG/DL (ref 70–99)
HCT VFR BLD AUTO: 24.9 % (ref 36–48)
HGB BLD-MCNC: 8.5 G/DL (ref 12–16)
LYMPHOCYTES # BLD: 0.5 K/UL (ref 1–5.1)
LYMPHOCYTES NFR BLD: 5.5 %
MAGNESIUM SERPL-MCNC: 1.77 MG/DL (ref 1.8–2.4)
MCH RBC QN AUTO: 28.5 PG (ref 26–34)
MCHC RBC AUTO-ENTMCNC: 34.3 G/DL (ref 31–36)
MCV RBC AUTO: 83 FL (ref 80–100)
MONOCYTES # BLD: 0.4 K/UL (ref 0–1.3)
MONOCYTES NFR BLD: 3.6 %
NEUTROPHILS # BLD: 8.9 K/UL (ref 1.7–7.7)
NEUTROPHILS NFR BLD: 89.2 %
PERFORMED ON: ABNORMAL
PERFORMED ON: ABNORMAL
PERFORMED ON: NORMAL
PERFORMED ON: NORMAL
PHOSPHATE SERPL-MCNC: 2.4 MG/DL (ref 2.5–4.9)
PLATELET # BLD AUTO: 344 K/UL (ref 135–450)
PMV BLD AUTO: 6.8 FL (ref 5–10.5)
POTASSIUM SERPL-SCNC: 3.4 MMOL/L (ref 3.5–5.1)
PROT SERPL-MCNC: 6.5 G/DL (ref 6.4–8.2)
RBC # BLD AUTO: 3 M/UL (ref 4–5.2)
SODIUM SERPL-SCNC: 133 MMOL/L (ref 136–145)
WBC # BLD AUTO: 10 K/UL (ref 4–11)

## 2024-11-04 PROCEDURE — 6370000000 HC RX 637 (ALT 250 FOR IP): Performed by: SURGERY

## 2024-11-04 PROCEDURE — 6360000002 HC RX W HCPCS: Performed by: STUDENT IN AN ORGANIZED HEALTH CARE EDUCATION/TRAINING PROGRAM

## 2024-11-04 PROCEDURE — 99024 POSTOP FOLLOW-UP VISIT: CPT | Performed by: SURGERY

## 2024-11-04 PROCEDURE — 84100 ASSAY OF PHOSPHORUS: CPT

## 2024-11-04 PROCEDURE — APPNB30 APP NON BILLABLE TIME 0-30 MINS: Performed by: NURSE PRACTITIONER

## 2024-11-04 PROCEDURE — 36415 COLL VENOUS BLD VENIPUNCTURE: CPT

## 2024-11-04 PROCEDURE — 99233 SBSQ HOSP IP/OBS HIGH 50: CPT | Performed by: INTERNAL MEDICINE

## 2024-11-04 PROCEDURE — 85025 COMPLETE CBC W/AUTO DIFF WBC: CPT

## 2024-11-04 PROCEDURE — 1200000000 HC SEMI PRIVATE

## 2024-11-04 PROCEDURE — APPSS15 APP SPLIT SHARED TIME 0-15 MINUTES: Performed by: NURSE PRACTITIONER

## 2024-11-04 PROCEDURE — 6360000002 HC RX W HCPCS: Performed by: INTERNAL MEDICINE

## 2024-11-04 PROCEDURE — 83735 ASSAY OF MAGNESIUM: CPT

## 2024-11-04 PROCEDURE — 87040 BLOOD CULTURE FOR BACTERIA: CPT

## 2024-11-04 PROCEDURE — 6360000002 HC RX W HCPCS: Performed by: SURGERY

## 2024-11-04 PROCEDURE — 80053 COMPREHEN METABOLIC PANEL: CPT

## 2024-11-04 PROCEDURE — 97116 GAIT TRAINING THERAPY: CPT

## 2024-11-04 RX ORDER — FLUCONAZOLE 2 MG/ML
200 INJECTION, SOLUTION INTRAVENOUS EVERY 24 HOURS
Status: DISCONTINUED | OUTPATIENT
Start: 2024-11-04 | End: 2024-11-10 | Stop reason: HOSPADM

## 2024-11-04 RX ADMIN — MORPHINE SULFATE 4 MG: 4 INJECTION, SOLUTION INTRAMUSCULAR; INTRAVENOUS at 18:01

## 2024-11-04 RX ADMIN — PIPERACILLIN SODIUM AND TAZOBACTAM SODIUM 3375 MG: 3; .375 INJECTION, SOLUTION INTRAVENOUS at 05:08

## 2024-11-04 RX ADMIN — GABAPENTIN 100 MG: 100 CAPSULE ORAL at 22:02

## 2024-11-04 RX ADMIN — ENOXAPARIN SODIUM 30 MG: 100 INJECTION SUBCUTANEOUS at 09:59

## 2024-11-04 RX ADMIN — LINEZOLID 600 MG: 600 INJECTION, SOLUTION INTRAVENOUS at 03:39

## 2024-11-04 RX ADMIN — Medication 1 CAPSULE: at 10:00

## 2024-11-04 RX ADMIN — Medication 1 CAPSULE: at 17:44

## 2024-11-04 RX ADMIN — ONDANSETRON 4 MG: 2 INJECTION, SOLUTION INTRAMUSCULAR; INTRAVENOUS at 10:09

## 2024-11-04 RX ADMIN — FLUCONAZOLE 200 MG: 2 INJECTION, SOLUTION INTRAVENOUS at 15:25

## 2024-11-04 RX ADMIN — PIPERACILLIN SODIUM AND TAZOBACTAM SODIUM 3375 MG: 3; .375 INJECTION, SOLUTION INTRAVENOUS at 22:06

## 2024-11-04 RX ADMIN — PIPERACILLIN SODIUM AND TAZOBACTAM SODIUM 3375 MG: 3; .375 INJECTION, SOLUTION INTRAVENOUS at 17:44

## 2024-11-04 ASSESSMENT — PAIN DESCRIPTION - ORIENTATION: ORIENTATION: MID;UPPER

## 2024-11-04 ASSESSMENT — PAIN - FUNCTIONAL ASSESSMENT: PAIN_FUNCTIONAL_ASSESSMENT: PREVENTS OR INTERFERES SOME ACTIVE ACTIVITIES AND ADLS

## 2024-11-04 ASSESSMENT — PAIN SCALES - GENERAL
PAINLEVEL_OUTOF10: 3
PAINLEVEL_OUTOF10: 2
PAINLEVEL_OUTOF10: 7

## 2024-11-04 ASSESSMENT — PAIN DESCRIPTION - PAIN TYPE: TYPE: SURGICAL PAIN

## 2024-11-04 ASSESSMENT — PAIN DESCRIPTION - LOCATION: LOCATION: ABDOMEN

## 2024-11-04 ASSESSMENT — PAIN DESCRIPTION - ONSET: ONSET: ON-GOING

## 2024-11-04 ASSESSMENT — PAIN DESCRIPTION - DESCRIPTORS: DESCRIPTORS: DISCOMFORT

## 2024-11-04 ASSESSMENT — PAIN DESCRIPTION - FREQUENCY: FREQUENCY: INTERMITTENT

## 2024-11-05 LAB
ALBUMIN SERPL-MCNC: 3 G/DL (ref 3.4–5)
ALBUMIN/GLOB SERPL: 0.9 {RATIO} (ref 1.1–2.2)
ALP SERPL-CCNC: 98 U/L (ref 40–129)
ALT SERPL-CCNC: <5 U/L (ref 10–40)
ANION GAP SERPL CALCULATED.3IONS-SCNC: 14 MMOL/L (ref 3–16)
AST SERPL-CCNC: 12 U/L (ref 15–37)
BACTERIA SPEC AEROBE CULT: ABNORMAL
BACTERIA SPEC ANAEROBE CULT: ABNORMAL
BACTERIA SPEC ANAEROBE CULT: ABNORMAL
BASOPHILS # BLD: 0 K/UL (ref 0–0.2)
BASOPHILS NFR BLD: 0.3 %
BILIRUB DIRECT SERPL-MCNC: <0.1 MG/DL (ref 0–0.3)
BILIRUB INDIRECT SERPL-MCNC: 0.2 MG/DL (ref 0–1)
BILIRUB SERPL-MCNC: 0.3 MG/DL (ref 0–1)
BUN SERPL-MCNC: 11 MG/DL (ref 7–20)
CALCIUM SERPL-MCNC: 8.8 MG/DL (ref 8.3–10.6)
CHLORIDE SERPL-SCNC: 95 MMOL/L (ref 99–110)
CO2 SERPL-SCNC: 27 MMOL/L (ref 21–32)
CREAT SERPL-MCNC: 0.5 MG/DL (ref 0.6–1.1)
DEPRECATED RDW RBC AUTO: 16.1 % (ref 12.4–15.4)
EOSINOPHIL # BLD: 0.2 K/UL (ref 0–0.6)
EOSINOPHIL NFR BLD: 1.9 %
GFR SERPLBLD CREATININE-BSD FMLA CKD-EPI: >90 ML/MIN/{1.73_M2}
GLUCOSE BLD-MCNC: 126 MG/DL (ref 70–99)
GLUCOSE BLD-MCNC: 186 MG/DL (ref 70–99)
GLUCOSE BLD-MCNC: 76 MG/DL (ref 70–99)
GLUCOSE BLD-MCNC: 80 MG/DL (ref 70–99)
GLUCOSE SERPL-MCNC: 81 MG/DL (ref 70–99)
GRAM STN SPEC: ABNORMAL
GRAM STN SPEC: ABNORMAL
HCT VFR BLD AUTO: 24.5 % (ref 36–48)
HGB BLD-MCNC: 8.2 G/DL (ref 12–16)
LYMPHOCYTES # BLD: 0.7 K/UL (ref 1–5.1)
LYMPHOCYTES NFR BLD: 8.9 %
MAGNESIUM SERPL-MCNC: 1.88 MG/DL (ref 1.8–2.4)
MCH RBC QN AUTO: 28 PG (ref 26–34)
MCHC RBC AUTO-ENTMCNC: 33.3 G/DL (ref 31–36)
MCV RBC AUTO: 84.3 FL (ref 80–100)
MONOCYTES # BLD: 0.4 K/UL (ref 0–1.3)
MONOCYTES NFR BLD: 4.6 %
NEUTROPHILS # BLD: 6.8 K/UL (ref 1.7–7.7)
NEUTROPHILS NFR BLD: 84.3 %
ORGANISM: ABNORMAL
PERFORMED ON: ABNORMAL
PERFORMED ON: ABNORMAL
PERFORMED ON: NORMAL
PERFORMED ON: NORMAL
PHOSPHATE SERPL-MCNC: 3.2 MG/DL (ref 2.5–4.9)
PLATELET # BLD AUTO: 318 K/UL (ref 135–450)
PMV BLD AUTO: 7.4 FL (ref 5–10.5)
POTASSIUM SERPL-SCNC: 3.7 MMOL/L (ref 3.5–5.1)
PROT SERPL-MCNC: 6.4 G/DL (ref 6.4–8.2)
RBC # BLD AUTO: 2.91 M/UL (ref 4–5.2)
SODIUM SERPL-SCNC: 136 MMOL/L (ref 136–145)
TRIGL SERPL-MCNC: 157 MG/DL (ref 0–150)
WBC # BLD AUTO: 8.1 K/UL (ref 4–11)

## 2024-11-05 PROCEDURE — 97535 SELF CARE MNGMENT TRAINING: CPT

## 2024-11-05 PROCEDURE — 6370000000 HC RX 637 (ALT 250 FOR IP): Performed by: SURGERY

## 2024-11-05 PROCEDURE — APPSS15 APP SPLIT SHARED TIME 0-15 MINUTES: Performed by: NURSE PRACTITIONER

## 2024-11-05 PROCEDURE — 84478 ASSAY OF TRIGLYCERIDES: CPT

## 2024-11-05 PROCEDURE — APPNB30 APP NON BILLABLE TIME 0-30 MINS: Performed by: NURSE PRACTITIONER

## 2024-11-05 PROCEDURE — 85025 COMPLETE CBC W/AUTO DIFF WBC: CPT

## 2024-11-05 PROCEDURE — 1200000000 HC SEMI PRIVATE

## 2024-11-05 PROCEDURE — 84100 ASSAY OF PHOSPHORUS: CPT

## 2024-11-05 PROCEDURE — 82248 BILIRUBIN DIRECT: CPT

## 2024-11-05 PROCEDURE — 6360000002 HC RX W HCPCS: Performed by: STUDENT IN AN ORGANIZED HEALTH CARE EDUCATION/TRAINING PROGRAM

## 2024-11-05 PROCEDURE — 2580000003 HC RX 258: Performed by: SURGERY

## 2024-11-05 PROCEDURE — 97530 THERAPEUTIC ACTIVITIES: CPT

## 2024-11-05 PROCEDURE — 99232 SBSQ HOSP IP/OBS MODERATE 35: CPT | Performed by: INTERNAL MEDICINE

## 2024-11-05 PROCEDURE — 99024 POSTOP FOLLOW-UP VISIT: CPT | Performed by: SURGERY

## 2024-11-05 PROCEDURE — 83735 ASSAY OF MAGNESIUM: CPT

## 2024-11-05 PROCEDURE — 6360000002 HC RX W HCPCS: Performed by: SURGERY

## 2024-11-05 PROCEDURE — 6360000002 HC RX W HCPCS: Performed by: INTERNAL MEDICINE

## 2024-11-05 PROCEDURE — 80053 COMPREHEN METABOLIC PANEL: CPT

## 2024-11-05 RX ADMIN — PIPERACILLIN SODIUM AND TAZOBACTAM SODIUM 3375 MG: 3; .375 INJECTION, SOLUTION INTRAVENOUS at 06:03

## 2024-11-05 RX ADMIN — FLUCONAZOLE 200 MG: 2 INJECTION, SOLUTION INTRAVENOUS at 14:24

## 2024-11-05 RX ADMIN — PIPERACILLIN SODIUM AND TAZOBACTAM SODIUM 3375 MG: 3; .375 INJECTION, SOLUTION INTRAVENOUS at 22:23

## 2024-11-05 RX ADMIN — GABAPENTIN 100 MG: 100 CAPSULE ORAL at 20:10

## 2024-11-05 RX ADMIN — GABAPENTIN 100 MG: 100 CAPSULE ORAL at 09:47

## 2024-11-05 RX ADMIN — Medication 10 ML: at 20:10

## 2024-11-05 RX ADMIN — GABAPENTIN 100 MG: 100 CAPSULE ORAL at 13:58

## 2024-11-05 RX ADMIN — Medication 1 CAPSULE: at 16:52

## 2024-11-05 RX ADMIN — Medication 1 CAPSULE: at 09:47

## 2024-11-05 RX ADMIN — PIPERACILLIN SODIUM AND TAZOBACTAM SODIUM 3375 MG: 3; .375 INJECTION, SOLUTION INTRAVENOUS at 13:58

## 2024-11-05 RX ADMIN — MORPHINE SULFATE 4 MG: 4 INJECTION, SOLUTION INTRAMUSCULAR; INTRAVENOUS at 02:52

## 2024-11-05 RX ADMIN — ENOXAPARIN SODIUM 30 MG: 100 INJECTION SUBCUTANEOUS at 09:48

## 2024-11-05 RX ADMIN — Medication 10 ML: at 09:48

## 2024-11-05 ASSESSMENT — PAIN SCALES - WONG BAKER
WONGBAKER_NUMERICALRESPONSE: NO HURT
WONGBAKER_NUMERICALRESPONSE: HURTS A LITTLE BIT

## 2024-11-05 ASSESSMENT — PAIN DESCRIPTION - ONSET: ONSET: ON-GOING

## 2024-11-05 ASSESSMENT — PAIN SCALES - GENERAL
PAINLEVEL_OUTOF10: 3
PAINLEVEL_OUTOF10: 0
PAINLEVEL_OUTOF10: 0
PAINLEVEL_OUTOF10: 2
PAINLEVEL_OUTOF10: 0
PAINLEVEL_OUTOF10: 0

## 2024-11-05 ASSESSMENT — PAIN - FUNCTIONAL ASSESSMENT: PAIN_FUNCTIONAL_ASSESSMENT: ACTIVITIES ARE NOT PREVENTED

## 2024-11-05 ASSESSMENT — PAIN DESCRIPTION - DESCRIPTORS: DESCRIPTORS: DISCOMFORT

## 2024-11-05 ASSESSMENT — PAIN DESCRIPTION - FREQUENCY: FREQUENCY: INTERMITTENT

## 2024-11-05 ASSESSMENT — PAIN DESCRIPTION - PAIN TYPE
TYPE: SURGICAL PAIN
TYPE: SURGICAL PAIN

## 2024-11-05 ASSESSMENT — PAIN DESCRIPTION - LOCATION: LOCATION: ABDOMEN

## 2024-11-05 ASSESSMENT — PAIN DESCRIPTION - ORIENTATION: ORIENTATION: MID;UPPER

## 2024-11-05 NOTE — PLAN OF CARE
Problem: Pain  Goal: Verbalizes/displays adequate comfort level or baseline comfort level  Outcome: Progressing     Problem: Safety - Adult  Goal: Free from fall injury  Outcome: Progressing     Problem: Cardiovascular - Adult  Goal: Absence of cardiac dysrhythmias or at baseline  Outcome: Progressing     Problem: Gastrointestinal - Adult  Goal: Minimal or absence of nausea and vomiting  Outcome: Progressing  Goal: Maintains adequate nutritional intake  Outcome: Progressing     Problem: Nutrition Deficit:  Goal: Optimize nutritional status  Outcome: Progressing  Flowsheets (Taken 11/4/2024 7723 by Kathrin Granados, RD, LD)  Nutrient intake appropriate for improving, restoring, or maintaining nutritional needs:   Assess nutritional status and recommend course of action   Recommend, monitor, and adjust tube feedings and TPN/PPN based on assessed needs     Problem: Discharge Planning  Goal: Discharge to home or other facility with appropriate resources  Outcome: Progressing

## 2024-11-05 NOTE — PLAN OF CARE
Problem: Pain  Goal: Verbalizes/displays adequate comfort level or baseline comfort level  11/5/2024 1622 by Marlene Pena RN  Outcome: Progressing  11/5/2024 1622 by Marlene Pena RN  Outcome: Progressing     Problem: Safety - Adult  Goal: Free from fall injury  11/5/2024 1622 by Marlene Pena RN  Outcome: Progressing  11/5/2024 1622 by Marlene Pena RN  Outcome: Progressing     Problem: Cardiovascular - Adult  Goal: Absence of cardiac dysrhythmias or at baseline  11/5/2024 1622 by Marlene Pena RN  Outcome: Progressing  11/5/2024 1622 by Marlene Pena RN  Outcome: Progressing     Problem: Gastrointestinal - Adult  Goal: Minimal or absence of nausea and vomiting  11/5/2024 1622 by Marlene Pena RN  Outcome: Progressing  11/5/2024 1622 by Marlene Pena RN  Outcome: Progressing  Goal: Maintains adequate nutritional intake  11/5/2024 1622 by Marlene Pena RN  Outcome: Progressing  11/5/2024 1622 by Marlene Pena RN  Outcome: Progressing     Problem: Nutrition Deficit:  Goal: Optimize nutritional status  11/5/2024 1622 by Marlene Pena RN  Outcome: Progressing  11/5/2024 1622 by Marlene Pena RN  Outcome: Progressing     Problem: Discharge Planning  Goal: Discharge to home or other facility with appropriate resources  11/5/2024 1622 by Marlene Pena RN  Outcome: Progressing  11/5/2024 1622 by Marlene Pena RN  Outcome: Progressing

## 2024-11-06 ENCOUNTER — ANESTHESIA EVENT (OUTPATIENT)
Dept: OPERATING ROOM | Age: 54
End: 2024-11-06
Payer: COMMERCIAL

## 2024-11-06 ENCOUNTER — ANESTHESIA (OUTPATIENT)
Dept: OPERATING ROOM | Age: 54
End: 2024-11-06
Payer: COMMERCIAL

## 2024-11-06 PROBLEM — S31.109A OPEN ABDOMINAL WALL WOUND: Status: ACTIVE | Noted: 2024-11-06

## 2024-11-06 LAB
ALBUMIN SERPL-MCNC: 3 G/DL (ref 3.4–5)
ALBUMIN/GLOB SERPL: 0.9 {RATIO} (ref 1.1–2.2)
ALP SERPL-CCNC: 77 U/L (ref 40–129)
ALT SERPL-CCNC: 6 U/L (ref 10–40)
ANION GAP SERPL CALCULATED.3IONS-SCNC: 8 MMOL/L (ref 3–16)
AST SERPL-CCNC: 12 U/L (ref 15–37)
BASOPHILS # BLD: 0 K/UL (ref 0–0.2)
BASOPHILS NFR BLD: 0.6 %
BILIRUB DIRECT SERPL-MCNC: <0.1 MG/DL (ref 0–0.3)
BILIRUB INDIRECT SERPL-MCNC: 0.2 MG/DL (ref 0–1)
BILIRUB SERPL-MCNC: 0.3 MG/DL (ref 0–1)
BUN SERPL-MCNC: 7 MG/DL (ref 7–20)
CALCIUM SERPL-MCNC: 8.8 MG/DL (ref 8.3–10.6)
CHLORIDE SERPL-SCNC: 99 MMOL/L (ref 99–110)
CO2 SERPL-SCNC: 31 MMOL/L (ref 21–32)
CREAT SERPL-MCNC: 0.4 MG/DL (ref 0.6–1.1)
DEPRECATED RDW RBC AUTO: 16.1 % (ref 12.4–15.4)
EOSINOPHIL # BLD: 0.2 K/UL (ref 0–0.6)
EOSINOPHIL NFR BLD: 3.4 %
FERRITIN SERPL IA-MCNC: 650 NG/ML (ref 15–150)
GFR SERPLBLD CREATININE-BSD FMLA CKD-EPI: >90 ML/MIN/{1.73_M2}
GLUCOSE BLD-MCNC: 117 MG/DL (ref 70–99)
GLUCOSE BLD-MCNC: 122 MG/DL (ref 70–99)
GLUCOSE SERPL-MCNC: 125 MG/DL (ref 70–99)
HCT VFR BLD AUTO: 24.6 % (ref 36–48)
HGB BLD-MCNC: 8.2 G/DL (ref 12–16)
IRON SATN MFR SERPL: 18 % (ref 15–50)
IRON SERPL-MCNC: 28 UG/DL (ref 37–145)
LYMPHOCYTES # BLD: 0.6 K/UL (ref 1–5.1)
LYMPHOCYTES NFR BLD: 12 %
MAGNESIUM SERPL-MCNC: 1.9 MG/DL (ref 1.8–2.4)
MCH RBC QN AUTO: 27.8 PG (ref 26–34)
MCHC RBC AUTO-ENTMCNC: 33.2 G/DL (ref 31–36)
MCV RBC AUTO: 83.7 FL (ref 80–100)
MONOCYTES # BLD: 0.3 K/UL (ref 0–1.3)
MONOCYTES NFR BLD: 6.7 %
NEUTROPHILS # BLD: 3.9 K/UL (ref 1.7–7.7)
NEUTROPHILS NFR BLD: 77.3 %
PERFORMED ON: ABNORMAL
PERFORMED ON: ABNORMAL
PHOSPHATE SERPL-MCNC: 3.2 MG/DL (ref 2.5–4.9)
PLATELET # BLD AUTO: 297 K/UL (ref 135–450)
PMV BLD AUTO: 6.8 FL (ref 5–10.5)
POTASSIUM SERPL-SCNC: 3.3 MMOL/L (ref 3.5–5.1)
PROT SERPL-MCNC: 6.4 G/DL (ref 6.4–8.2)
RBC # BLD AUTO: 2.94 M/UL (ref 4–5.2)
SODIUM SERPL-SCNC: 138 MMOL/L (ref 136–145)
TIBC SERPL-MCNC: 152 UG/DL (ref 260–445)
WBC # BLD AUTO: 5.1 K/UL (ref 4–11)

## 2024-11-06 PROCEDURE — 82248 BILIRUBIN DIRECT: CPT

## 2024-11-06 PROCEDURE — 84100 ASSAY OF PHOSPHORUS: CPT

## 2024-11-06 PROCEDURE — 1200000000 HC SEMI PRIVATE

## 2024-11-06 PROCEDURE — 0DQN0ZZ REPAIR SIGMOID COLON, OPEN APPROACH: ICD-10-PCS | Performed by: SURGERY

## 2024-11-06 PROCEDURE — 6370000000 HC RX 637 (ALT 250 FOR IP): Performed by: INTERNAL MEDICINE

## 2024-11-06 PROCEDURE — 80053 COMPREHEN METABOLIC PANEL: CPT

## 2024-11-06 PROCEDURE — 6360000002 HC RX W HCPCS: Performed by: SURGERY

## 2024-11-06 PROCEDURE — 3700000001 HC ADD 15 MINUTES (ANESTHESIA): Performed by: SURGERY

## 2024-11-06 PROCEDURE — 7100000000 HC PACU RECOVERY - FIRST 15 MIN: Performed by: SURGERY

## 2024-11-06 PROCEDURE — 2709999900 HC NON-CHARGEABLE SUPPLY: Performed by: SURGERY

## 2024-11-06 PROCEDURE — 83550 IRON BINDING TEST: CPT

## 2024-11-06 PROCEDURE — 2500000003 HC RX 250 WO HCPCS: Performed by: ANESTHESIOLOGY

## 2024-11-06 PROCEDURE — 2580000003 HC RX 258: Performed by: SURGERY

## 2024-11-06 PROCEDURE — 6360000002 HC RX W HCPCS: Performed by: INTERNAL MEDICINE

## 2024-11-06 PROCEDURE — 82728 ASSAY OF FERRITIN: CPT

## 2024-11-06 PROCEDURE — 99232 SBSQ HOSP IP/OBS MODERATE 35: CPT | Performed by: INTERNAL MEDICINE

## 2024-11-06 PROCEDURE — 6360000002 HC RX W HCPCS: Performed by: STUDENT IN AN ORGANIZED HEALTH CARE EDUCATION/TRAINING PROGRAM

## 2024-11-06 PROCEDURE — 83540 ASSAY OF IRON: CPT

## 2024-11-06 PROCEDURE — 7100000001 HC PACU RECOVERY - ADDTL 15 MIN: Performed by: SURGERY

## 2024-11-06 PROCEDURE — A4217 STERILE WATER/SALINE, 500 ML: HCPCS | Performed by: SURGERY

## 2024-11-06 PROCEDURE — 3600000002 HC SURGERY LEVEL 2 BASE: Performed by: SURGERY

## 2024-11-06 PROCEDURE — 13160 SEC CLSR SURG WND/DEHSN XTN: CPT | Performed by: SURGERY

## 2024-11-06 PROCEDURE — 6370000000 HC RX 637 (ALT 250 FOR IP): Performed by: SURGERY

## 2024-11-06 PROCEDURE — 6360000002 HC RX W HCPCS: Performed by: ANESTHESIOLOGY

## 2024-11-06 PROCEDURE — 3600000012 HC SURGERY LEVEL 2 ADDTL 15MIN: Performed by: SURGERY

## 2024-11-06 PROCEDURE — 3700000000 HC ANESTHESIA ATTENDED CARE: Performed by: SURGERY

## 2024-11-06 PROCEDURE — 2720000010 HC SURG SUPPLY STERILE: Performed by: SURGERY

## 2024-11-06 PROCEDURE — 85025 COMPLETE CBC W/AUTO DIFF WBC: CPT

## 2024-11-06 PROCEDURE — 83735 ASSAY OF MAGNESIUM: CPT

## 2024-11-06 PROCEDURE — 6360000002 HC RX W HCPCS: Performed by: NURSE PRACTITIONER

## 2024-11-06 PROCEDURE — 36415 COLL VENOUS BLD VENIPUNCTURE: CPT

## 2024-11-06 RX ORDER — FENTANYL CITRATE 50 UG/ML
INJECTION, SOLUTION INTRAMUSCULAR; INTRAVENOUS
Status: DISCONTINUED | OUTPATIENT
Start: 2024-11-06 | End: 2024-11-06 | Stop reason: SDUPTHER

## 2024-11-06 RX ORDER — MEPERIDINE HYDROCHLORIDE 25 MG/ML
12.5 INJECTION INTRAMUSCULAR; INTRAVENOUS; SUBCUTANEOUS EVERY 5 MIN PRN
Status: DISCONTINUED | OUTPATIENT
Start: 2024-11-06 | End: 2024-11-06 | Stop reason: HOSPADM

## 2024-11-06 RX ORDER — POTASSIUM CHLORIDE 29.8 MG/ML
20 INJECTION INTRAVENOUS ONCE
Status: COMPLETED | OUTPATIENT
Start: 2024-11-06 | End: 2024-11-06

## 2024-11-06 RX ORDER — ONDANSETRON 2 MG/ML
INJECTION INTRAMUSCULAR; INTRAVENOUS
Status: DISCONTINUED | OUTPATIENT
Start: 2024-11-06 | End: 2024-11-06 | Stop reason: SDUPTHER

## 2024-11-06 RX ORDER — POTASSIUM CHLORIDE 1500 MG/1
40 TABLET, EXTENDED RELEASE ORAL PRN
Status: DISPENSED | OUTPATIENT
Start: 2024-11-06 | End: 2024-11-09

## 2024-11-06 RX ORDER — DEXAMETHASONE SODIUM PHOSPHATE 4 MG/ML
INJECTION, SOLUTION INTRA-ARTICULAR; INTRALESIONAL; INTRAMUSCULAR; INTRAVENOUS; SOFT TISSUE
Status: DISCONTINUED | OUTPATIENT
Start: 2024-11-06 | End: 2024-11-06 | Stop reason: SDUPTHER

## 2024-11-06 RX ORDER — OXYCODONE HYDROCHLORIDE 5 MG/1
5 TABLET ORAL
Status: DISCONTINUED | OUTPATIENT
Start: 2024-11-06 | End: 2024-11-06 | Stop reason: HOSPADM

## 2024-11-06 RX ORDER — HYDRALAZINE HYDROCHLORIDE 20 MG/ML
10 INJECTION INTRAMUSCULAR; INTRAVENOUS
Status: DISCONTINUED | OUTPATIENT
Start: 2024-11-06 | End: 2024-11-06 | Stop reason: HOSPADM

## 2024-11-06 RX ORDER — LIDOCAINE HYDROCHLORIDE 20 MG/ML
INJECTION, SOLUTION INFILTRATION; PERINEURAL
Status: DISCONTINUED | OUTPATIENT
Start: 2024-11-06 | End: 2024-11-06 | Stop reason: SDUPTHER

## 2024-11-06 RX ORDER — GLYCOPYRROLATE 0.2 MG/ML
INJECTION INTRAMUSCULAR; INTRAVENOUS
Status: DISCONTINUED | OUTPATIENT
Start: 2024-11-06 | End: 2024-11-06 | Stop reason: SDUPTHER

## 2024-11-06 RX ORDER — LABETALOL HYDROCHLORIDE 5 MG/ML
10 INJECTION, SOLUTION INTRAVENOUS
Status: DISCONTINUED | OUTPATIENT
Start: 2024-11-06 | End: 2024-11-06 | Stop reason: HOSPADM

## 2024-11-06 RX ORDER — ONDANSETRON 2 MG/ML
4 INJECTION INTRAMUSCULAR; INTRAVENOUS
Status: DISCONTINUED | OUTPATIENT
Start: 2024-11-06 | End: 2024-11-06 | Stop reason: HOSPADM

## 2024-11-06 RX ORDER — PROPOFOL 10 MG/ML
INJECTION, EMULSION INTRAVENOUS
Status: DISCONTINUED | OUTPATIENT
Start: 2024-11-06 | End: 2024-11-06 | Stop reason: SDUPTHER

## 2024-11-06 RX ORDER — BUPIVACAINE HYDROCHLORIDE 5 MG/ML
INJECTION, SOLUTION EPIDURAL; INTRACAUDAL
Status: COMPLETED | OUTPATIENT
Start: 2024-11-06 | End: 2024-11-06

## 2024-11-06 RX ORDER — POTASSIUM CHLORIDE 7.45 MG/ML
10 INJECTION INTRAVENOUS PRN
Status: ACTIVE | OUTPATIENT
Start: 2024-11-06 | End: 2024-11-09

## 2024-11-06 RX ORDER — HYDROMORPHONE HYDROCHLORIDE 2 MG/ML
0.5 INJECTION, SOLUTION INTRAMUSCULAR; INTRAVENOUS; SUBCUTANEOUS EVERY 5 MIN PRN
Status: DISCONTINUED | OUTPATIENT
Start: 2024-11-06 | End: 2024-11-06 | Stop reason: HOSPADM

## 2024-11-06 RX ORDER — NALOXONE HYDROCHLORIDE 0.4 MG/ML
INJECTION, SOLUTION INTRAMUSCULAR; INTRAVENOUS; SUBCUTANEOUS PRN
Status: DISCONTINUED | OUTPATIENT
Start: 2024-11-06 | End: 2024-11-06 | Stop reason: HOSPADM

## 2024-11-06 RX ORDER — MAGNESIUM HYDROXIDE 1200 MG/15ML
LIQUID ORAL CONTINUOUS PRN
Status: COMPLETED | OUTPATIENT
Start: 2024-11-06 | End: 2024-11-06

## 2024-11-06 RX ADMIN — Medication 1 CAPSULE: at 09:08

## 2024-11-06 RX ADMIN — ONDANSETRON 4 MG: 2 INJECTION INTRAMUSCULAR; INTRAVENOUS at 19:58

## 2024-11-06 RX ADMIN — PIPERACILLIN SODIUM AND TAZOBACTAM SODIUM 3375 MG: 3; .375 INJECTION, SOLUTION INTRAVENOUS at 22:25

## 2024-11-06 RX ADMIN — FLUCONAZOLE 200 MG: 2 INJECTION, SOLUTION INTRAVENOUS at 14:29

## 2024-11-06 RX ADMIN — HYDROMORPHONE HYDROCHLORIDE 0.5 MG: 2 INJECTION, SOLUTION INTRAMUSCULAR; INTRAVENOUS; SUBCUTANEOUS at 20:44

## 2024-11-06 RX ADMIN — POTASSIUM CHLORIDE 40 MEQ: 1500 TABLET, EXTENDED RELEASE ORAL at 09:08

## 2024-11-06 RX ADMIN — PIPERACILLIN SODIUM AND TAZOBACTAM SODIUM 3375 MG: 3; .375 INJECTION, SOLUTION INTRAVENOUS at 06:01

## 2024-11-06 RX ADMIN — GLYCOPYRROLATE 0.2 MG: 0.2 INJECTION, SOLUTION INTRAMUSCULAR; INTRAVENOUS at 20:06

## 2024-11-06 RX ADMIN — GABAPENTIN 100 MG: 100 CAPSULE ORAL at 09:08

## 2024-11-06 RX ADMIN — FENTANYL CITRATE 50 MCG: 50 INJECTION, SOLUTION INTRAMUSCULAR; INTRAVENOUS at 20:05

## 2024-11-06 RX ADMIN — PROPOFOL 100 MG: 10 INJECTION, EMULSION INTRAVENOUS at 19:58

## 2024-11-06 RX ADMIN — FENTANYL CITRATE 50 MCG: 50 INJECTION, SOLUTION INTRAMUSCULAR; INTRAVENOUS at 20:02

## 2024-11-06 RX ADMIN — LABETALOL HYDROCHLORIDE 10 MG: 5 INJECTION, SOLUTION INTRAVENOUS at 21:34

## 2024-11-06 RX ADMIN — POTASSIUM CHLORIDE 20 MEQ: 29.8 INJECTION, SOLUTION INTRAVENOUS at 10:27

## 2024-11-06 RX ADMIN — HYDROMORPHONE HYDROCHLORIDE 0.5 MG: 2 INJECTION, SOLUTION INTRAMUSCULAR; INTRAVENOUS; SUBCUTANEOUS at 20:52

## 2024-11-06 RX ADMIN — HYDROMORPHONE HYDROCHLORIDE 0.5 MG: 2 INJECTION, SOLUTION INTRAMUSCULAR; INTRAVENOUS; SUBCUTANEOUS at 21:11

## 2024-11-06 RX ADMIN — DEXAMETHASONE SODIUM PHOSPHATE 4 MG: 4 INJECTION, SOLUTION INTRAMUSCULAR; INTRAVENOUS at 19:58

## 2024-11-06 RX ADMIN — LIDOCAINE HYDROCHLORIDE 40 MG: 20 INJECTION, SOLUTION INFILTRATION; PERINEURAL at 19:58

## 2024-11-06 RX ADMIN — PIPERACILLIN SODIUM AND TAZOBACTAM SODIUM 3375 MG: 3; .375 INJECTION, SOLUTION INTRAVENOUS at 14:34

## 2024-11-06 ASSESSMENT — PAIN DESCRIPTION - DESCRIPTORS
DESCRIPTORS: DISCOMFORT
DESCRIPTORS: DISCOMFORT

## 2024-11-06 ASSESSMENT — PAIN SCALES - GENERAL
PAINLEVEL_OUTOF10: 0
PAINLEVEL_OUTOF10: 8
PAINLEVEL_OUTOF10: 8
PAINLEVEL_OUTOF10: 0
PAINLEVEL_OUTOF10: 8
PAINLEVEL_OUTOF10: 0

## 2024-11-06 ASSESSMENT — PAIN - FUNCTIONAL ASSESSMENT
PAIN_FUNCTIONAL_ASSESSMENT: PREVENTS OR INTERFERES SOME ACTIVE ACTIVITIES AND ADLS

## 2024-11-06 ASSESSMENT — PAIN DESCRIPTION - LOCATION
LOCATION: ABDOMEN

## 2024-11-06 ASSESSMENT — PAIN SCALES - WONG BAKER
WONGBAKER_NUMERICALRESPONSE: NO HURT

## 2024-11-06 ASSESSMENT — LIFESTYLE VARIABLES: SMOKING_STATUS: 0

## 2024-11-06 ASSESSMENT — PAIN DESCRIPTION - ORIENTATION: ORIENTATION: INNER

## 2024-11-06 NOTE — PLAN OF CARE
Pt Alert and oriented.  at the bedside. Pt is currently NPO for the Wound closer procedure. Fall precautions maintain. Bed in lower positions and call lights within reach. Instructed pt to use call lights when assistance needed. Pt is waiting to go on procedure at this time.       Problem: Pain  Goal: Verbalizes/displays adequate comfort level or baseline comfort level  Outcome: Progressing     Problem: Safety - Adult  Goal: Free from fall injury  Outcome: Progressing     Problem: Cardiovascular - Adult  Goal: Absence of cardiac dysrhythmias or at baseline  Outcome: Progressing     Problem: Nutrition Deficit:  Goal: Optimize nutritional status  Outcome: Progressing

## 2024-11-06 NOTE — ANESTHESIA PRE PROCEDURE
NPO Status: Time of last liquid consumption: 0000                        Time of last solid consumption: 0000                        Date of last liquid consumption: 10/31/24                        Date of last solid food consumption: 10/31/24    BMI:   Wt Readings from Last 3 Encounters:   11/06/24 38 kg (83 lb 12.4 oz)   09/16/24 38.5 kg (84 lb 12.8 oz)   07/11/24 45.2 kg (99 lb 9.6 oz)     Body mass index is 18.12 kg/m².    CBC:   Lab Results   Component Value Date/Time    WBC 5.1 11/06/2024 04:21 AM    RBC 2.94 11/06/2024 04:21 AM    HGB 8.2 11/06/2024 04:21 AM    HCT 24.6 11/06/2024 04:21 AM    MCV 83.7 11/06/2024 04:21 AM    RDW 16.1 11/06/2024 04:21 AM     11/06/2024 04:21 AM       CMP:   Lab Results   Component Value Date/Time     11/06/2024 04:21 AM    K 3.3 11/06/2024 04:21 AM    K 3.3 10/24/2024 05:20 AM    CL 99 11/06/2024 04:21 AM    CO2 31 11/06/2024 04:21 AM    BUN 7 11/06/2024 04:21 AM    CREATININE 0.4 11/06/2024 04:21 AM    AGRATIO 0.9 11/06/2024 04:21 AM    LABGLOM >90 11/06/2024 04:21 AM    GLUCOSE 125 11/06/2024 04:21 AM    CALCIUM 8.8 11/06/2024 04:21 AM    BILITOT 0.3 11/06/2024 04:21 AM    ALKPHOS 77 11/06/2024 04:21 AM    AST 12 11/06/2024 04:21 AM    ALT 6 11/06/2024 04:21 AM       POC Tests:   Recent Labs     11/06/24  1129   POCGLU 117*       Coags:   Lab Results   Component Value Date/Time    PROTIME 15.1 10/23/2024 05:35 AM    INR 1.17 10/23/2024 05:35 AM    APTT 35.5 07/18/2024 08:33 AM       HCG (If Applicable): No results found for: \"PREGTESTUR\", \"PREGSERUM\", \"HCG\", \"HCGQUANT\"     ABGs: No results found for: \"PHART\", \"PO2ART\", \"LTZ1JUF\", \"LLP3KNY\", \"BEART\", \"B2JFFSBI\"     Type & Screen (If Applicable):  Lab Results   Component Value Date    LABANTI Negative 07/18/2024       Drug/Infectious Status (If Applicable):  No results found for: \"HIV\", \"HEPCAB\"    COVID-19 Screening (If Applicable): No results found for: \"COVID19\"        Anesthesia Evaluation  Patient

## 2024-11-06 NOTE — PLAN OF CARE
Problem: Pain  Goal: Verbalizes/displays adequate comfort level or baseline comfort level  11/5/2024 2359 by Yennifer Palumbo RN  Outcome: Progressing  Flowsheets (Taken 11/5/2024 1645 by Marlene Pena RN)  Verbalizes/displays adequate comfort level or baseline comfort level: Encourage patient to monitor pain and request assistance  11/5/2024 1622 by Marlene Pena RN  Outcome: Progressing  11/5/2024 1622 by Marlene Pena RN  Outcome: Progressing     Problem: Safety - Adult  Goal: Free from fall injury  11/5/2024 2359 by Yennifer Palumbo, RN  Outcome: Progressing  11/5/2024 1622 by Marlene Pena RN  Outcome: Progressing  11/5/2024 1622 by Marlene Pena RN  Outcome: Progressing     Problem: Cardiovascular - Adult  Goal: Absence of cardiac dysrhythmias or at baseline  11/5/2024 1622 by Marlene Pena RN  Outcome: Progressing  11/5/2024 1622 by Marlene Pena RN  Outcome: Progressing     Problem: Gastrointestinal - Adult  Goal: Minimal or absence of nausea and vomiting  11/5/2024 1622 by Marlene Pena RN  Outcome: Progressing  11/5/2024 1622 by Marlene Pena RN  Outcome: Progressing  Goal: Maintains adequate nutritional intake  11/5/2024 1622 by Marlene Pena RN  Outcome: Progressing  11/5/2024 1622 by Marlene Pena RN  Outcome: Progressing     Problem: Nutrition Deficit:  Goal: Optimize nutritional status  11/5/2024 1622 by Marlene Pena RN  Outcome: Progressing  11/5/2024 1622 by Marlene Pena RN  Outcome: Progressing     Problem: Discharge Planning  Goal: Discharge to home or other facility with appropriate resources  11/5/2024 1622 by Marlene Pena RN  Outcome: Progressing  11/5/2024 1622 by Marlene Pena RN  Outcome: Progressing       accompanied Olivia Graham to the emergency department on 7/3/2024.    Return date if applicable: 07/06/2024        If you have any questions or concerns, please don't hesitate to call.      Yesenia Bansal PA-C

## 2024-11-07 LAB
ALBUMIN SERPL-MCNC: 2.8 G/DL (ref 3.4–5)
ALBUMIN/GLOB SERPL: 0.8 {RATIO} (ref 1.1–2.2)
ALP SERPL-CCNC: 77 U/L (ref 40–129)
ALT SERPL-CCNC: 6 U/L (ref 10–40)
ANION GAP SERPL CALCULATED.3IONS-SCNC: 9 MMOL/L (ref 3–16)
AST SERPL-CCNC: 10 U/L (ref 15–37)
BASOPHILS # BLD: 0 K/UL (ref 0–0.2)
BASOPHILS NFR BLD: 0.1 %
BILIRUB SERPL-MCNC: <0.2 MG/DL (ref 0–1)
BUN SERPL-MCNC: 9 MG/DL (ref 7–20)
CALCIUM SERPL-MCNC: 8.5 MG/DL (ref 8.3–10.6)
CHLORIDE SERPL-SCNC: 104 MMOL/L (ref 99–110)
CO2 SERPL-SCNC: 24 MMOL/L (ref 21–32)
CREAT SERPL-MCNC: 0.5 MG/DL (ref 0.6–1.1)
DEPRECATED RDW RBC AUTO: 16.3 % (ref 12.4–15.4)
EOSINOPHIL # BLD: 0 K/UL (ref 0–0.6)
EOSINOPHIL NFR BLD: 0 %
GFR SERPLBLD CREATININE-BSD FMLA CKD-EPI: >90 ML/MIN/{1.73_M2}
GLUCOSE BLD-MCNC: 112 MG/DL (ref 70–99)
GLUCOSE BLD-MCNC: 124 MG/DL (ref 70–99)
GLUCOSE BLD-MCNC: 155 MG/DL (ref 70–99)
GLUCOSE BLD-MCNC: 158 MG/DL (ref 70–99)
GLUCOSE SERPL-MCNC: 191 MG/DL (ref 70–99)
HCT VFR BLD AUTO: 23.7 % (ref 36–48)
HGB BLD-MCNC: 8 G/DL (ref 12–16)
LYMPHOCYTES # BLD: 0.3 K/UL (ref 1–5.1)
LYMPHOCYTES NFR BLD: 4.6 %
MAGNESIUM SERPL-MCNC: 1.95 MG/DL (ref 1.8–2.4)
MCH RBC QN AUTO: 28.3 PG (ref 26–34)
MCHC RBC AUTO-ENTMCNC: 33.7 G/DL (ref 31–36)
MCV RBC AUTO: 83.9 FL (ref 80–100)
MONOCYTES # BLD: 0 K/UL (ref 0–1.3)
MONOCYTES NFR BLD: 0.7 %
NEUTROPHILS # BLD: 6.8 K/UL (ref 1.7–7.7)
NEUTROPHILS NFR BLD: 94.6 %
PERFORMED ON: ABNORMAL
PHOSPHATE SERPL-MCNC: 3.8 MG/DL (ref 2.5–4.9)
PLATELET # BLD AUTO: 258 K/UL (ref 135–450)
PMV BLD AUTO: 6.8 FL (ref 5–10.5)
POTASSIUM SERPL-SCNC: 4.8 MMOL/L (ref 3.5–5.1)
PROT SERPL-MCNC: 6.4 G/DL (ref 6.4–8.2)
RBC # BLD AUTO: 2.83 M/UL (ref 4–5.2)
SODIUM SERPL-SCNC: 137 MMOL/L (ref 136–145)
WBC # BLD AUTO: 7.1 K/UL (ref 4–11)

## 2024-11-07 PROCEDURE — 97530 THERAPEUTIC ACTIVITIES: CPT

## 2024-11-07 PROCEDURE — 80053 COMPREHEN METABOLIC PANEL: CPT

## 2024-11-07 PROCEDURE — 36591 DRAW BLOOD OFF VENOUS DEVICE: CPT

## 2024-11-07 PROCEDURE — 83735 ASSAY OF MAGNESIUM: CPT

## 2024-11-07 PROCEDURE — 85025 COMPLETE CBC W/AUTO DIFF WBC: CPT

## 2024-11-07 PROCEDURE — 99232 SBSQ HOSP IP/OBS MODERATE 35: CPT | Performed by: INTERNAL MEDICINE

## 2024-11-07 PROCEDURE — 2580000003 HC RX 258: Performed by: INTERNAL MEDICINE

## 2024-11-07 PROCEDURE — 6360000002 HC RX W HCPCS: Performed by: INTERNAL MEDICINE

## 2024-11-07 PROCEDURE — 84100 ASSAY OF PHOSPHORUS: CPT

## 2024-11-07 PROCEDURE — 6360000002 HC RX W HCPCS: Performed by: SURGERY

## 2024-11-07 PROCEDURE — 97535 SELF CARE MNGMENT TRAINING: CPT

## 2024-11-07 PROCEDURE — 6370000000 HC RX 637 (ALT 250 FOR IP): Performed by: SURGERY

## 2024-11-07 PROCEDURE — 99024 POSTOP FOLLOW-UP VISIT: CPT | Performed by: SURGERY

## 2024-11-07 PROCEDURE — 1200000000 HC SEMI PRIVATE

## 2024-11-07 RX ADMIN — GABAPENTIN 100 MG: 100 CAPSULE ORAL at 20:31

## 2024-11-07 RX ADMIN — PIPERACILLIN SODIUM AND TAZOBACTAM SODIUM 3375 MG: 3; .375 INJECTION, SOLUTION INTRAVENOUS at 21:32

## 2024-11-07 RX ADMIN — GABAPENTIN 100 MG: 100 CAPSULE ORAL at 14:18

## 2024-11-07 RX ADMIN — GABAPENTIN 100 MG: 100 CAPSULE ORAL at 09:15

## 2024-11-07 RX ADMIN — Medication 1 CAPSULE: at 16:55

## 2024-11-07 RX ADMIN — PIPERACILLIN SODIUM AND TAZOBACTAM SODIUM 3375 MG: 3; .375 INJECTION, SOLUTION INTRAVENOUS at 06:02

## 2024-11-07 RX ADMIN — ENOXAPARIN SODIUM 30 MG: 100 INJECTION SUBCUTANEOUS at 09:15

## 2024-11-07 RX ADMIN — SODIUM CHLORIDE 125 MG: 9 INJECTION, SOLUTION INTRAVENOUS at 10:01

## 2024-11-07 RX ADMIN — FLUCONAZOLE 200 MG: 2 INJECTION, SOLUTION INTRAVENOUS at 14:47

## 2024-11-07 RX ADMIN — Medication 1 CAPSULE: at 09:15

## 2024-11-07 RX ADMIN — PIPERACILLIN SODIUM AND TAZOBACTAM SODIUM 3375 MG: 3; .375 INJECTION, SOLUTION INTRAVENOUS at 14:23

## 2024-11-07 ASSESSMENT — PAIN DESCRIPTION - LOCATION: LOCATION: ABDOMEN

## 2024-11-07 ASSESSMENT — PAIN DESCRIPTION - ONSET: ONSET: ON-GOING

## 2024-11-07 ASSESSMENT — PAIN SCALES - WONG BAKER
WONGBAKER_NUMERICALRESPONSE: NO HURT
WONGBAKER_NUMERICALRESPONSE: NO HURT

## 2024-11-07 ASSESSMENT — PAIN DESCRIPTION - PAIN TYPE: TYPE: SURGICAL PAIN

## 2024-11-07 ASSESSMENT — PAIN SCALES - GENERAL
PAINLEVEL_OUTOF10: 0

## 2024-11-07 ASSESSMENT — PAIN DESCRIPTION - ORIENTATION: ORIENTATION: RIGHT;LEFT

## 2024-11-07 ASSESSMENT — PAIN DESCRIPTION - DESCRIPTORS: DESCRIPTORS: DISCOMFORT

## 2024-11-07 ASSESSMENT — PAIN - FUNCTIONAL ASSESSMENT: PAIN_FUNCTIONAL_ASSESSMENT: ACTIVITIES ARE NOT PREVENTED

## 2024-11-07 ASSESSMENT — PAIN DESCRIPTION - FREQUENCY: FREQUENCY: INTERMITTENT

## 2024-11-07 NOTE — BRIEF OP NOTE
Brief Postoperative Note      Patient: Stephani Reyes  YOB: 1970  MRN: [id number]    Date of Procedure: 11/6/2024    Pre-Op Diagnosis Codes:      * Open wound of abdominal wall, initial encounter [S31.109A]    Post-Op Diagnosis: Same       Procedure(s):  SECONDARY ABDOMINAL WALL WOUND CLOSURE    Surgeon(s):  Iam Nolasco MD    Assistant:  Surgical Assistant: Nabeel Lin RN    Anesthesia: General    Estimated Blood Loss (mL): Minimal    Complications: None    Specimens:   * No specimens in log *    Implants:  * No implants in log *      Drains:   Colostomy LLQ (Active)   Stomal Appliance 2 piece;Flat 11/06/24 1648   Flange Size (inches) 2.25 Inches 11/04/24 1200   Stoma  Assessment Pink;Protrudes;Moist 11/06/24 1648   Peristomal Assessment Clean, dry & intact 11/06/24 1648   Mucocutaneous Junction Intact 11/05/24 2354   Treatment Pouch change;Site care 11/04/24 1200   Stool Appearance Watery 11/06/24 1445   Stool Color Brown 11/06/24 1445   Stool Amount Medium 11/06/24 1445   Output (mL) 0 ml 11/06/24 0216       [REMOVED] Closed/Suction Drain Right RLQ Bulb (Removed)   Site Description Clean, dry & intact 11/04/24 1119   Dressing Status Clean, dry & intact 11/04/24 1119   Drainage Appearance Serosanguinous 11/04/24 1119   Drain Status To bulb suction 11/04/24 1119   Output (ml) 10 ml 11/04/24 1119       [REMOVED] NG/OG/NJ/NE Tube Nasogastric Left nostril (Removed)   Surrounding Skin Clean, dry & intact 11/04/24 1022   Securement device Tape 11/04/24 1022   Status Suction-low continuous 11/04/24 1022   Placement Verified Gastric Contents 11/04/24 1022   NG/OG/NJ/NE External Measurement (cm) 53 cm 11/04/24 0915   Drainage Appearance Brown 11/04/24 1022   Free Water/Flush (mL) 20 mL 11/04/24 1119   Output (mL) 70 ml 11/04/24 1119       [REMOVED] Urinary Catheter 10/31/24 Liriano (Removed)   $ Urethral catheter insertion Inserted for procedure 10/31/24 1918   Catheter Indications

## 2024-11-07 NOTE — ANESTHESIA POSTPROCEDURE EVALUATION
Department of Anesthesiology  Postprocedure Note    Patient: Stephani Reyes  MRN: [id number]  YOB: 1970  Date of evaluation: 11/6/2024    Procedure Summary       Date: 11/06/24 Room / Location: [address]    Anesthesia Start: 1953 Anesthesia Stop: 2037    Procedure: SECONDARY ABDOMINAL WALL WOUND CLOSURE Diagnosis:       Open wound of abdominal wall, initial encounter      (Open wound of abdominal wall, initial encounter [S31.109A])    Surgeons: Iam Nolasco MD Responsible Provider: James Borja MD    Anesthesia Type: general ASA Status: 3            Anesthesia Type: No value filed.    Katarina Phase I: Katarina Score: 9    Katarina Phase II:      Anesthesia Post Evaluation    Patient location during evaluation: PACU  Patient participation: complete - patient participated  Level of consciousness: awake and alert  Airway patency: patent  Nausea & Vomiting: no vomiting and no nausea  Cardiovascular status: hemodynamically stable  Respiratory status: acceptable  Hydration status: stable  Pain management: adequate    No notable events documented.

## 2024-11-07 NOTE — PLAN OF CARE
Problem: Pain  Goal: Verbalizes/displays adequate comfort level or baseline comfort level  Outcome: Progressing  Flowsheets (Taken 11/7/2024 6704)  Verbalizes/displays adequate comfort level or baseline comfort level: Encourage patient to monitor pain and request assistance     Problem: Safety - Adult  Goal: Free from fall injury  Outcome: Progressing     Problem: Cardiovascular - Adult  Goal: Absence of cardiac dysrhythmias or at baseline  Outcome: Progressing     Problem: Gastrointestinal - Adult  Goal: Minimal or absence of nausea and vomiting  Outcome: Progressing  Goal: Maintains adequate nutritional intake  Outcome: Progressing     Problem: Nutrition Deficit:  Goal: Optimize nutritional status  Outcome: Progressing  Flowsheets (Taken 11/7/2024 0930 by Sakina Becerril, MS, RD, LD)  Nutrient intake appropriate for improving, restoring, or maintaining nutritional needs:   Recommend appropriate diets, oral nutritional supplements, and vitamin/mineral supplements   Monitor oral intake, labs, and treatment plans     Problem: Discharge Planning  Goal: Discharge to home or other facility with appropriate resources  Outcome: Progressing

## 2024-11-07 NOTE — DISCHARGE INSTR - DIET

## 2024-11-07 NOTE — OP NOTE
[place] [address]                            OPERATIVE REPORT      PATIENT NAME: TESSIE LEWIS            : 1970  MED REC NO: [id number]                      ROOM: [address] [place] [address]  ACCOUNT NO: [id number]                       ADMIT DATE: 10/22/2024  PROVIDER: Iam Nolasco MD      DATE OF PROCEDURE:  2024    SURGEON:  Iam Nolasco MD    PREOPERATIVE DIAGNOSES:    1. Open abdominal wound.  2. Status post Kevin procedure for perforated colon.    POSTOPERATIVE DIAGNOSES:    1. Open abdominal wound.  2. Status post Kevin procedure for perforated colon.    PROCEDURE:  Open abdominal wound closure, 24 cm wound, BRIGIDA placement.    ANESTHESIA:  General plus local.    ESTIMATED BLOOD LOSS:  Minimal.    COMPLICATIONS:  None.    DETAILS OF SURGERY:  The patient has a planned return to the OR today for wound closure.  She has had a colon resection and submitted for perforated colon with intraabdominal abscesses and had abscesses drained and the colon removed.  The colostomy has been healing well.  Wound has been cleaning up nicely and she presents for wound closure today.    DESCRIPTION OF PROCEDURE:  The patient was brought to the operating room, placed on the operating table in supine position.  Compression boots were placed.  General anesthetic was administered.  The old colostomy was removed.  This area was draped from the field with a 10/10 drape.  Betadine scrub and paint was completed.  Time-out was done.  Local anesthetic was placed throughout the wound area.  Betadine scrub and paint was done again with a scrub brush and the wound fascia was cleaned thoroughly.  Hemostasis was assured with Bovie electrocautery.  A small amount of devitalized fat was removed.  The fascial sutures were intact without fascial necrosis.  Skin was closed with interrupted staples and a BRIGIDA wound dressing system was 
send a couple of inflammatory versus tumor appearing nodules from the jejunum for permanent pathology, this was more likely chronic inflammatory change than tumor, but pathology is pending at this time.  The abdomen was irrigated thoroughly with 2 L of saline solution, and again the lysis of adhesions took an extra hour and 30 minutes of surgical time.  The colon was evaluated.  The area of the cecum, right colon, transverse colon, and left colon down to the distal left colon were all normal.  The midtransverse colon was actually stuck down into the sigmoid colon perforated area, but was able to be dissected out without any colostomies.  The area of the perforation of the sigmoid colon was at the rectosigmoid junction region and this area was ultimately resected.  The left and sigmoid colon were mobilized along the lateral line of Toldt.  The mid sigmoid colon was staple-transected with the [place] 60 mm stapler with a green load.  The mesentery was taken down in this area and we dissected the matted small bowel out and away from the left pelvic sidewall carefully preserving the left ureter and pelvic vessels without any injury.  We were able to get down below the area of the perforation and at this point in time, cut across about fresh edges.  The end of the rectum was then suture-closed in 2 layers with running 3-0 Vicryl suture for initial layer and running 2-0 Stratafix overlying this area as a 2nd layer.  The abdomen was irrigated again.  The surgical team changed their gloves.  We then mobilized the left colon a little further and selected a site to bring colostomy out in the left abdomen.  This was done by removing the distal skin, the underlying subcutaneous tissue, and opening the fascia and bringing the colon out at this site.  There was good length to the stoma brought out and it was pink and healthy.  The NG was assured in proper position in the abdomen, and the bowel was all run again 2 times assuring

## 2024-11-08 LAB
ALBUMIN SERPL-MCNC: 2.7 G/DL (ref 3.4–5)
ALBUMIN SERPL-MCNC: 2.7 G/DL (ref 3.4–5)
ALBUMIN/GLOB SERPL: 0.8 {RATIO} (ref 1.1–2.2)
ALP SERPL-CCNC: 68 U/L (ref 40–129)
ALP SERPL-CCNC: 68 U/L (ref 40–129)
ALT SERPL-CCNC: <5 U/L (ref 10–40)
ALT SERPL-CCNC: <5 U/L (ref 10–40)
ANION GAP SERPL CALCULATED.3IONS-SCNC: 8 MMOL/L (ref 3–16)
AST SERPL-CCNC: 11 U/L (ref 15–37)
AST SERPL-CCNC: 12 U/L (ref 15–37)
BACTERIA BLD CULT ORG #2: NORMAL
BACTERIA BLD CULT: NORMAL
BASOPHILS # BLD: 0 K/UL (ref 0–0.2)
BASOPHILS NFR BLD: 0.9 %
BILIRUB DIRECT SERPL-MCNC: <0.1 MG/DL (ref 0–0.3)
BILIRUB INDIRECT SERPL-MCNC: ABNORMAL MG/DL (ref 0–1)
BILIRUB SERPL-MCNC: <0.2 MG/DL (ref 0–1)
BILIRUB SERPL-MCNC: <0.2 MG/DL (ref 0–1)
BUN SERPL-MCNC: 8 MG/DL (ref 7–20)
CALCIUM SERPL-MCNC: 8.7 MG/DL (ref 8.3–10.6)
CHLORIDE SERPL-SCNC: 105 MMOL/L (ref 99–110)
CO2 SERPL-SCNC: 26 MMOL/L (ref 21–32)
CREAT SERPL-MCNC: 0.5 MG/DL (ref 0.6–1.1)
CRP SERPL-MCNC: 40.1 MG/L (ref 0–5.1)
DEPRECATED RDW RBC AUTO: 16.2 % (ref 12.4–15.4)
EOSINOPHIL # BLD: 0.1 K/UL (ref 0–0.6)
EOSINOPHIL NFR BLD: 3.4 %
ERYTHROCYTE [SEDIMENTATION RATE] IN BLOOD BY WESTERGREN METHOD: 47 MM/HR (ref 0–30)
GFR SERPLBLD CREATININE-BSD FMLA CKD-EPI: >90 ML/MIN/{1.73_M2}
GLUCOSE BLD-MCNC: 105 MG/DL (ref 70–99)
GLUCOSE BLD-MCNC: 91 MG/DL (ref 70–99)
GLUCOSE BLD-MCNC: 97 MG/DL (ref 70–99)
GLUCOSE SERPL-MCNC: 106 MG/DL (ref 70–99)
HCT VFR BLD AUTO: 22.4 % (ref 36–48)
HCT VFR BLD AUTO: 24.1 % (ref 36–48)
HGB BLD-MCNC: 7.4 G/DL (ref 12–16)
HGB BLD-MCNC: 7.8 G/DL (ref 12–16)
LYMPHOCYTES # BLD: 1.2 K/UL (ref 1–5.1)
LYMPHOCYTES NFR BLD: 30.7 %
MAGNESIUM SERPL-MCNC: 1.98 MG/DL (ref 1.8–2.4)
MCH RBC QN AUTO: 27.9 PG (ref 26–34)
MCHC RBC AUTO-ENTMCNC: 33 G/DL (ref 31–36)
MCV RBC AUTO: 84.5 FL (ref 80–100)
MONOCYTES # BLD: 0.3 K/UL (ref 0–1.3)
MONOCYTES NFR BLD: 6.9 %
NEUTROPHILS # BLD: 2.3 K/UL (ref 1.7–7.7)
NEUTROPHILS NFR BLD: 58.1 %
PERFORMED ON: ABNORMAL
PERFORMED ON: NORMAL
PERFORMED ON: NORMAL
PHOSPHATE SERPL-MCNC: 2.9 MG/DL (ref 2.5–4.9)
PLATELET # BLD AUTO: 235 K/UL (ref 135–450)
PMV BLD AUTO: 7.1 FL (ref 5–10.5)
POTASSIUM SERPL-SCNC: 3.6 MMOL/L (ref 3.5–5.1)
PROT SERPL-MCNC: 6 G/DL (ref 6.4–8.2)
PROT SERPL-MCNC: 6.1 G/DL (ref 6.4–8.2)
RBC # BLD AUTO: 2.65 M/UL (ref 4–5.2)
SODIUM SERPL-SCNC: 139 MMOL/L (ref 136–145)
WBC # BLD AUTO: 3.9 K/UL (ref 4–11)

## 2024-11-08 PROCEDURE — 84100 ASSAY OF PHOSPHORUS: CPT

## 2024-11-08 PROCEDURE — 85652 RBC SED RATE AUTOMATED: CPT

## 2024-11-08 PROCEDURE — 83735 ASSAY OF MAGNESIUM: CPT

## 2024-11-08 PROCEDURE — 80053 COMPREHEN METABOLIC PANEL: CPT

## 2024-11-08 PROCEDURE — 99024 POSTOP FOLLOW-UP VISIT: CPT | Performed by: SURGERY

## 2024-11-08 PROCEDURE — 85018 HEMOGLOBIN: CPT

## 2024-11-08 PROCEDURE — 6360000002 HC RX W HCPCS: Performed by: SURGERY

## 2024-11-08 PROCEDURE — 6360000002 HC RX W HCPCS: Performed by: INTERNAL MEDICINE

## 2024-11-08 PROCEDURE — 6370000000 HC RX 637 (ALT 250 FOR IP): Performed by: SURGERY

## 2024-11-08 PROCEDURE — APPSS15 APP SPLIT SHARED TIME 0-15 MINUTES: Performed by: NURSE PRACTITIONER

## 2024-11-08 PROCEDURE — 85025 COMPLETE CBC W/AUTO DIFF WBC: CPT

## 2024-11-08 PROCEDURE — 85014 HEMATOCRIT: CPT

## 2024-11-08 PROCEDURE — 6370000000 HC RX 637 (ALT 250 FOR IP): Performed by: INTERNAL MEDICINE

## 2024-11-08 PROCEDURE — 2580000003 HC RX 258: Performed by: SURGERY

## 2024-11-08 PROCEDURE — 99233 SBSQ HOSP IP/OBS HIGH 50: CPT | Performed by: INTERNAL MEDICINE

## 2024-11-08 PROCEDURE — 86140 C-REACTIVE PROTEIN: CPT

## 2024-11-08 PROCEDURE — 1200000000 HC SEMI PRIVATE

## 2024-11-08 PROCEDURE — 2580000003 HC RX 258: Performed by: INTERNAL MEDICINE

## 2024-11-08 PROCEDURE — APPNB30 APP NON BILLABLE TIME 0-30 MINS: Performed by: NURSE PRACTITIONER

## 2024-11-08 PROCEDURE — 36415 COLL VENOUS BLD VENIPUNCTURE: CPT

## 2024-11-08 RX ORDER — LINEZOLID 600 MG/1
600 TABLET, FILM COATED ORAL EVERY 12 HOURS SCHEDULED
Status: DISCONTINUED | OUTPATIENT
Start: 2024-11-08 | End: 2024-11-10 | Stop reason: HOSPADM

## 2024-11-08 RX ADMIN — GABAPENTIN 100 MG: 100 CAPSULE ORAL at 10:03

## 2024-11-08 RX ADMIN — LINEZOLID 600 MG: 600 TABLET, FILM COATED ORAL at 20:19

## 2024-11-08 RX ADMIN — GABAPENTIN 100 MG: 100 CAPSULE ORAL at 20:19

## 2024-11-08 RX ADMIN — FLUCONAZOLE 200 MG: 2 INJECTION, SOLUTION INTRAVENOUS at 14:05

## 2024-11-08 RX ADMIN — LINEZOLID 600 MG: 600 TABLET, FILM COATED ORAL at 11:33

## 2024-11-08 RX ADMIN — Medication 10 ML: at 20:19

## 2024-11-08 RX ADMIN — ERTAPENEM SODIUM 1000 MG: 1 INJECTION INTRAMUSCULAR; INTRAVENOUS at 13:54

## 2024-11-08 RX ADMIN — GABAPENTIN 100 MG: 100 CAPSULE ORAL at 13:54

## 2024-11-08 RX ADMIN — SODIUM CHLORIDE 125 MG: 9 INJECTION, SOLUTION INTRAVENOUS at 10:21

## 2024-11-08 RX ADMIN — Medication 1 CAPSULE: at 10:03

## 2024-11-08 RX ADMIN — PIPERACILLIN SODIUM AND TAZOBACTAM SODIUM 3375 MG: 3; .375 INJECTION, SOLUTION INTRAVENOUS at 06:07

## 2024-11-08 RX ADMIN — Medication 10 ML: at 10:22

## 2024-11-08 RX ADMIN — Medication 1 CAPSULE: at 17:59

## 2024-11-08 ASSESSMENT — PAIN SCALES - WONG BAKER
WONGBAKER_NUMERICALRESPONSE: NO HURT

## 2024-11-08 ASSESSMENT — PAIN DESCRIPTION - LOCATION
LOCATION: ABDOMEN
LOCATION: ABDOMEN

## 2024-11-08 ASSESSMENT — PAIN SCALES - GENERAL
PAINLEVEL_OUTOF10: 0

## 2024-11-08 ASSESSMENT — PAIN DESCRIPTION - ONSET: ONSET: ON-GOING

## 2024-11-08 ASSESSMENT — PAIN DESCRIPTION - PAIN TYPE: TYPE: SURGICAL PAIN

## 2024-11-08 ASSESSMENT — PAIN DESCRIPTION - DESCRIPTORS: DESCRIPTORS: DISCOMFORT

## 2024-11-08 ASSESSMENT — PAIN DESCRIPTION - FREQUENCY: FREQUENCY: INTERMITTENT

## 2024-11-08 ASSESSMENT — PAIN DESCRIPTION - ORIENTATION: ORIENTATION: RIGHT;LEFT

## 2024-11-08 ASSESSMENT — PAIN - FUNCTIONAL ASSESSMENT: PAIN_FUNCTIONAL_ASSESSMENT: ACTIVITIES ARE NOT PREVENTED

## 2024-11-08 NOTE — PLAN OF CARE
Problem: Pain  Goal: Verbalizes/displays adequate comfort level or baseline comfort level  11/7/2024 2122 by Puja Mullins RN  Outcome: Progressing  11/7/2024 1326 by Marlene Pena RN  Outcome: Progressing  Flowsheets (Taken 11/7/2024 0845)  Verbalizes/displays adequate comfort level or baseline comfort level: Encourage patient to monitor pain and request assistance     Problem: Safety - Adult  Goal: Free from fall injury  11/7/2024 2122 by Puja Mullins RN  Outcome: Progressing  11/7/2024 1326 by Marlene Pena RN  Outcome: Progressing     Problem: Cardiovascular - Adult  Goal: Absence of cardiac dysrhythmias or at baseline  11/7/2024 2122 by Puja Mullins RN  Outcome: Progressing  11/7/2024 1326 by Marlene Pena RN  Outcome: Progressing     Problem: Gastrointestinal - Adult  Goal: Minimal or absence of nausea and vomiting  11/7/2024 2122 by Puja Mullins RN  Outcome: Progressing  11/7/2024 1326 by Marlene Pena RN  Outcome: Progressing  Goal: Maintains adequate nutritional intake  11/7/2024 2122 by Puja Mullins RN  Outcome: Progressing  11/7/2024 1326 by Marlene Pena RN  Outcome: Progressing     Problem: Nutrition Deficit:  Goal: Optimize nutritional status  11/7/2024 2122 by Puja Mullins RN  Outcome: Progressing  11/7/2024 1326 by Marlene Pena RN  Outcome: Progressing  Flowsheets (Taken 11/7/2024 0943 by Sakina Becerril, MS, RD, LD)  Nutrient intake appropriate for improving, restoring, or maintaining nutritional needs:   Recommend appropriate diets, oral nutritional supplements, and vitamin/mineral supplements   Monitor oral intake, labs, and treatment plans     Problem: Discharge Planning  Goal: Discharge to home or other facility with appropriate resources  11/7/2024 2122 by Puja Mullins RN  Outcome: Progressing  11/7/2024 1326 by Marlene Pena RN  Outcome: Progressing  Flowsheets (Taken 11/7/2024

## 2024-11-09 LAB
ALBUMIN SERPL-MCNC: 3.2 G/DL (ref 3.4–5)
ALBUMIN/GLOB SERPL: 0.8 {RATIO} (ref 1.1–2.2)
ALP SERPL-CCNC: 81 U/L (ref 40–129)
ALT SERPL-CCNC: 9 U/L (ref 10–40)
ANION GAP SERPL CALCULATED.3IONS-SCNC: 9 MMOL/L (ref 3–16)
AST SERPL-CCNC: 13 U/L (ref 15–37)
BASOPHILS # BLD: 0 K/UL (ref 0–0.2)
BASOPHILS NFR BLD: 1.2 %
BILIRUB SERPL-MCNC: <0.2 MG/DL (ref 0–1)
BUN SERPL-MCNC: 6 MG/DL (ref 7–20)
CALCIUM SERPL-MCNC: 9.3 MG/DL (ref 8.3–10.6)
CHLORIDE SERPL-SCNC: 103 MMOL/L (ref 99–110)
CO2 SERPL-SCNC: 28 MMOL/L (ref 21–32)
CREAT SERPL-MCNC: 0.5 MG/DL (ref 0.6–1.1)
DEPRECATED RDW RBC AUTO: 16.3 % (ref 12.4–15.4)
EOSINOPHIL # BLD: 0.2 K/UL (ref 0–0.6)
EOSINOPHIL NFR BLD: 6.9 %
GFR SERPLBLD CREATININE-BSD FMLA CKD-EPI: >90 ML/MIN/{1.73_M2}
GLUCOSE BLD-MCNC: 103 MG/DL (ref 70–99)
GLUCOSE BLD-MCNC: 103 MG/DL (ref 70–99)
GLUCOSE BLD-MCNC: 94 MG/DL (ref 70–99)
GLUCOSE BLD-MCNC: 97 MG/DL (ref 70–99)
GLUCOSE SERPL-MCNC: 105 MG/DL (ref 70–99)
HCT VFR BLD AUTO: 25.3 % (ref 36–48)
HGB BLD-MCNC: 8.2 G/DL (ref 12–16)
LYMPHOCYTES # BLD: 1 K/UL (ref 1–5.1)
LYMPHOCYTES NFR BLD: 32.1 %
MAGNESIUM SERPL-MCNC: 1.95 MG/DL (ref 1.8–2.4)
MCH RBC QN AUTO: 27.4 PG (ref 26–34)
MCHC RBC AUTO-ENTMCNC: 32.3 G/DL (ref 31–36)
MCV RBC AUTO: 84.6 FL (ref 80–100)
MONOCYTES # BLD: 0.3 K/UL (ref 0–1.3)
MONOCYTES NFR BLD: 8.2 %
NEUTROPHILS # BLD: 1.6 K/UL (ref 1.7–7.7)
NEUTROPHILS NFR BLD: 51.6 %
PERFORMED ON: ABNORMAL
PERFORMED ON: ABNORMAL
PERFORMED ON: NORMAL
PERFORMED ON: NORMAL
PHOSPHATE SERPL-MCNC: 3.9 MG/DL (ref 2.5–4.9)
PLATELET # BLD AUTO: 240 K/UL (ref 135–450)
PMV BLD AUTO: 7.1 FL (ref 5–10.5)
POTASSIUM SERPL-SCNC: 3.9 MMOL/L (ref 3.5–5.1)
PROT SERPL-MCNC: 7 G/DL (ref 6.4–8.2)
RBC # BLD AUTO: 3 M/UL (ref 4–5.2)
REASON FOR REJECTION: NORMAL
REJECTED TEST: NORMAL
SODIUM SERPL-SCNC: 140 MMOL/L (ref 136–145)
WBC # BLD AUTO: 3.1 K/UL (ref 4–11)

## 2024-11-09 PROCEDURE — 6370000000 HC RX 637 (ALT 250 FOR IP): Performed by: SURGERY

## 2024-11-09 PROCEDURE — 83735 ASSAY OF MAGNESIUM: CPT

## 2024-11-09 PROCEDURE — 2580000003 HC RX 258: Performed by: INTERNAL MEDICINE

## 2024-11-09 PROCEDURE — 6370000000 HC RX 637 (ALT 250 FOR IP): Performed by: INTERNAL MEDICINE

## 2024-11-09 PROCEDURE — 6360000002 HC RX W HCPCS: Performed by: SURGERY

## 2024-11-09 PROCEDURE — 1200000000 HC SEMI PRIVATE

## 2024-11-09 PROCEDURE — 80053 COMPREHEN METABOLIC PANEL: CPT

## 2024-11-09 PROCEDURE — 85025 COMPLETE CBC W/AUTO DIFF WBC: CPT

## 2024-11-09 PROCEDURE — 84100 ASSAY OF PHOSPHORUS: CPT

## 2024-11-09 PROCEDURE — 99024 POSTOP FOLLOW-UP VISIT: CPT | Performed by: SURGERY

## 2024-11-09 PROCEDURE — 6360000002 HC RX W HCPCS: Performed by: INTERNAL MEDICINE

## 2024-11-09 PROCEDURE — 2580000003 HC RX 258: Performed by: SURGERY

## 2024-11-09 RX ADMIN — LINEZOLID 600 MG: 600 TABLET, FILM COATED ORAL at 20:01

## 2024-11-09 RX ADMIN — FLUCONAZOLE 200 MG: 2 INJECTION, SOLUTION INTRAVENOUS at 15:20

## 2024-11-09 RX ADMIN — ERTAPENEM SODIUM 1000 MG: 1 INJECTION INTRAMUSCULAR; INTRAVENOUS at 13:34

## 2024-11-09 RX ADMIN — GABAPENTIN 100 MG: 100 CAPSULE ORAL at 13:34

## 2024-11-09 RX ADMIN — Medication 10 ML: at 08:57

## 2024-11-09 RX ADMIN — Medication 10 ML: at 20:02

## 2024-11-09 RX ADMIN — Medication 1 CAPSULE: at 18:49

## 2024-11-09 RX ADMIN — GABAPENTIN 100 MG: 100 CAPSULE ORAL at 20:01

## 2024-11-09 RX ADMIN — GABAPENTIN 100 MG: 100 CAPSULE ORAL at 08:56

## 2024-11-09 RX ADMIN — SODIUM CHLORIDE 125 MG: 9 INJECTION, SOLUTION INTRAVENOUS at 09:10

## 2024-11-09 RX ADMIN — Medication 1 CAPSULE: at 08:56

## 2024-11-09 RX ADMIN — LINEZOLID 600 MG: 600 TABLET, FILM COATED ORAL at 08:56

## 2024-11-09 ASSESSMENT — PAIN SCALES - WONG BAKER
WONGBAKER_NUMERICALRESPONSE: NO HURT

## 2024-11-09 ASSESSMENT — PAIN SCALES - GENERAL
PAINLEVEL_OUTOF10: 0
PAINLEVEL_OUTOF10: 0

## 2024-11-09 NOTE — PLAN OF CARE
Problem: Pain  Goal: Verbalizes/displays adequate comfort level or baseline comfort level  11/9/2024 0805 by Marlene Pena RN  Outcome: Progressing  11/9/2024 0202 by Giovana Brooks RN  Outcome: Progressing     Problem: Safety - Adult  Goal: Free from fall injury  11/9/2024 0805 by Marlene Pena RN  Outcome: Progressing  11/9/2024 0202 by Giovana Brooks RN  Outcome: Progressing     Problem: Cardiovascular - Adult  Goal: Absence of cardiac dysrhythmias or at baseline  11/9/2024 0805 by Marlene Pena RN  Outcome: Progressing  11/9/2024 0202 by Giovana Brooks RN  Outcome: Progressing     Problem: Gastrointestinal - Adult  Goal: Minimal or absence of nausea and vomiting  11/9/2024 0805 by Marlene Pena RN  Outcome: Progressing  11/9/2024 0202 by Giovana Brooks RN  Outcome: Progressing  Goal: Maintains adequate nutritional intake  11/9/2024 0805 by Marlene Pena RN  Outcome: Progressing  11/9/2024 0202 by Giovana Brooks RN  Outcome: Progressing     Problem: Nutrition Deficit:  Goal: Optimize nutritional status  11/9/2024 0805 by Marlene Pena RN  Outcome: Progressing  11/9/2024 0202 by Giovana Brooks RN  Outcome: Progressing     Problem: Discharge Planning  Goal: Discharge to home or other facility with appropriate resources  11/9/2024 0805 by Marlene Pena RN  Outcome: Progressing  11/9/2024 0202 by Giovana Brooks RN  Outcome: Progressing

## 2024-11-09 NOTE — PLAN OF CARE
Problem: Pain  Goal: Verbalizes/displays adequate comfort level or baseline comfort level  11/9/2024 0805 by Marlene Pena RN  Outcome: Progressing  11/9/2024 0805 by Marlene Pena RN  Outcome: Progressing  11/9/2024 0202 by Giovana Brooks RN  Outcome: Progressing     Problem: Safety - Adult  Goal: Free from fall injury  11/9/2024 0805 by Marlene Pena RN  Outcome: Progressing  11/9/2024 0805 by Marlene Pena RN  Outcome: Progressing  11/9/2024 0202 by Giovana Brooks, RN  Outcome: Progressing     Problem: Cardiovascular - Adult  Goal: Absence of cardiac dysrhythmias or at baseline  11/9/2024 0805 by Marlene Pena RN  Outcome: Progressing  11/9/2024 0805 by Marlene Pena RN  Outcome: Progressing  11/9/2024 0202 by Giovana Brooks, RN  Outcome: Progressing     Problem: Gastrointestinal - Adult  Goal: Minimal or absence of nausea and vomiting  11/9/2024 0805 by Marlene Pena RN  Outcome: Progressing  11/9/2024 0805 by Marlene Pena RN  Outcome: Progressing  11/9/2024 0202 by Giovana Brooks, RN  Outcome: Progressing  Goal: Maintains adequate nutritional intake  11/9/2024 0805 by Marlene Pena RN  Outcome: Progressing  11/9/2024 0805 by Marlene Pena RN  Outcome: Progressing  11/9/2024 0202 by Giovana Brooks, RN  Outcome: Progressing     Problem: Nutrition Deficit:  Goal: Optimize nutritional status  11/9/2024 0805 by Marlene Pena RN  Outcome: Progressing  11/9/2024 0805 by Marlene Pena RN  Outcome: Progressing  11/9/2024 0202 by Giovana Brooks, RN  Outcome: Progressing     Problem: Discharge Planning  Goal: Discharge to home or other facility with appropriate resources  11/9/2024 0805 by Marlene Pena RN  Outcome: Progressing  11/9/2024 0805 by Marlene Pena RN  Outcome: Progressing  11/9/2024 0202 by Giovana Brooks RN  Outcome: Progressing

## 2024-11-09 NOTE — PLAN OF CARE
Problem: Pain  Goal: Verbalizes/displays adequate comfort level or baseline comfort level  11/9/2024 0202 by Giovana Brooks RN  Outcome: Progressing  11/8/2024 1310 by Marlene Pena RN  Outcome: Progressing     Problem: Safety - Adult  Goal: Free from fall injury  11/9/2024 0202 by Giovana Brooks RN  Outcome: Progressing  11/8/2024 1310 by Marlene Pena RN  Outcome: Progressing     Problem: Cardiovascular - Adult  Goal: Absence of cardiac dysrhythmias or at baseline  11/9/2024 0202 by Giovana Brooks RN  Outcome: Progressing  11/8/2024 1310 by Marlene Pena RN  Outcome: Progressing     Problem: Gastrointestinal - Adult  Goal: Minimal or absence of nausea and vomiting  11/9/2024 0202 by Giovana Brooks RN  Outcome: Progressing  11/8/2024 1310 by Marlene Pena RN  Outcome: Progressing  Goal: Maintains adequate nutritional intake  11/9/2024 0202 by Giovana Brooks RN  Outcome: Progressing  11/8/2024 1310 by Marlene Pena RN  Outcome: Progressing     Problem: Nutrition Deficit:  Goal: Optimize nutritional status  11/9/2024 0202 by Giovana Brooks RN  Outcome: Progressing  11/8/2024 1310 by Marlene Pena RN  Outcome: Progressing     Problem: Discharge Planning  Goal: Discharge to home or other facility with appropriate resources  11/9/2024 0202 by Giovana Brooks RN  Outcome: Progressing  11/8/2024 1310 by Marlene Pena RN  Outcome: Progressing  Flowsheets (Taken 11/8/2024 0915)  Discharge to home or other facility with appropriate resources: Identify barriers to discharge with patient and caregiver

## 2024-11-10 VITALS
HEART RATE: 92 BPM | RESPIRATION RATE: 18 BRPM | SYSTOLIC BLOOD PRESSURE: 108 MMHG | OXYGEN SATURATION: 99 % | WEIGHT: 85.98 LBS | DIASTOLIC BLOOD PRESSURE: 75 MMHG | TEMPERATURE: 98.2 F | HEIGHT: 57 IN | BODY MASS INDEX: 18.55 KG/M2

## 2024-11-10 LAB
ALBUMIN SERPL-MCNC: 3.4 G/DL (ref 3.4–5)
ALBUMIN/GLOB SERPL: 1 {RATIO} (ref 1.1–2.2)
ALP SERPL-CCNC: 81 U/L (ref 40–129)
ALT SERPL-CCNC: 6 U/L (ref 10–40)
ANION GAP SERPL CALCULATED.3IONS-SCNC: 9 MMOL/L (ref 3–16)
AST SERPL-CCNC: 11 U/L (ref 15–37)
BASOPHILS # BLD: 0 K/UL (ref 0–0.2)
BASOPHILS NFR BLD: 0.7 %
BILIRUB SERPL-MCNC: <0.2 MG/DL (ref 0–1)
BUN SERPL-MCNC: 8 MG/DL (ref 7–20)
CALCIUM SERPL-MCNC: 9.6 MG/DL (ref 8.3–10.6)
CHLORIDE SERPL-SCNC: 103 MMOL/L (ref 99–110)
CO2 SERPL-SCNC: 28 MMOL/L (ref 21–32)
CREAT SERPL-MCNC: 0.5 MG/DL (ref 0.6–1.1)
DEPRECATED RDW RBC AUTO: 16.6 % (ref 12.4–15.4)
EOSINOPHIL # BLD: 0.3 K/UL (ref 0–0.6)
EOSINOPHIL NFR BLD: 9.7 %
GFR SERPLBLD CREATININE-BSD FMLA CKD-EPI: >90 ML/MIN/{1.73_M2}
GLUCOSE BLD-MCNC: 112 MG/DL (ref 70–99)
GLUCOSE SERPL-MCNC: 108 MG/DL (ref 70–99)
HCT VFR BLD AUTO: 25.3 % (ref 36–48)
HGB BLD-MCNC: 8.5 G/DL (ref 12–16)
LYMPHOCYTES # BLD: 1.2 K/UL (ref 1–5.1)
LYMPHOCYTES NFR BLD: 40.2 %
MAGNESIUM SERPL-MCNC: 1.95 MG/DL (ref 1.8–2.4)
MCH RBC QN AUTO: 28.5 PG (ref 26–34)
MCHC RBC AUTO-ENTMCNC: 33.6 G/DL (ref 31–36)
MCV RBC AUTO: 84.7 FL (ref 80–100)
MONOCYTES # BLD: 0.3 K/UL (ref 0–1.3)
MONOCYTES NFR BLD: 8.6 %
NEUTROPHILS # BLD: 1.2 K/UL (ref 1.7–7.7)
NEUTROPHILS NFR BLD: 40.8 %
PERFORMED ON: ABNORMAL
PHOSPHATE SERPL-MCNC: 4.3 MG/DL (ref 2.5–4.9)
PLATELET # BLD AUTO: 243 K/UL (ref 135–450)
PMV BLD AUTO: 7 FL (ref 5–10.5)
POTASSIUM SERPL-SCNC: 4.2 MMOL/L (ref 3.5–5.1)
PROT SERPL-MCNC: 6.8 G/DL (ref 6.4–8.2)
RBC # BLD AUTO: 2.98 M/UL (ref 4–5.2)
SODIUM SERPL-SCNC: 140 MMOL/L (ref 136–145)
WBC # BLD AUTO: 3 K/UL (ref 4–11)

## 2024-11-10 PROCEDURE — 2580000003 HC RX 258: Performed by: SURGERY

## 2024-11-10 PROCEDURE — 80053 COMPREHEN METABOLIC PANEL: CPT

## 2024-11-10 PROCEDURE — 84100 ASSAY OF PHOSPHORUS: CPT

## 2024-11-10 PROCEDURE — 83735 ASSAY OF MAGNESIUM: CPT

## 2024-11-10 PROCEDURE — 6370000000 HC RX 637 (ALT 250 FOR IP): Performed by: SURGERY

## 2024-11-10 PROCEDURE — 85025 COMPLETE CBC W/AUTO DIFF WBC: CPT

## 2024-11-10 PROCEDURE — 6370000000 HC RX 637 (ALT 250 FOR IP): Performed by: INTERNAL MEDICINE

## 2024-11-10 RX ORDER — GABAPENTIN 100 MG/1
100 CAPSULE ORAL 3 TIMES DAILY
Qty: 90 CAPSULE | Refills: 0 | Status: SHIPPED | OUTPATIENT
Start: 2024-11-10 | End: 2024-12-10

## 2024-11-10 RX ORDER — LACTOBACILLUS RHAMNOSUS GG 10B CELL
1 CAPSULE ORAL 2 TIMES DAILY WITH MEALS
Qty: 28 CAPSULE | Refills: 0 | Status: SHIPPED | OUTPATIENT
Start: 2024-11-10 | End: 2024-11-24

## 2024-11-10 RX ORDER — LINEZOLID 600 MG/1
600 TABLET, FILM COATED ORAL EVERY 12 HOURS SCHEDULED
Qty: 28 TABLET | Refills: 0 | Status: SHIPPED | OUTPATIENT
Start: 2024-11-10 | End: 2024-11-24

## 2024-11-10 RX ORDER — FERROUS SULFATE 325(65) MG
325 TABLET ORAL 2 TIMES DAILY
Qty: 30 TABLET | Refills: 3 | Status: SHIPPED | OUTPATIENT
Start: 2024-11-10

## 2024-11-10 RX ORDER — OXYCODONE AND ACETAMINOPHEN 5; 325 MG/1; MG/1
1 TABLET ORAL EVERY 6 HOURS PRN
Qty: 12 TABLET | Refills: 0 | Status: SHIPPED | OUTPATIENT
Start: 2024-11-10 | End: 2024-11-13

## 2024-11-10 RX ADMIN — LINEZOLID 600 MG: 600 TABLET, FILM COATED ORAL at 08:31

## 2024-11-10 RX ADMIN — Medication 10 ML: at 08:31

## 2024-11-10 RX ADMIN — GABAPENTIN 100 MG: 100 CAPSULE ORAL at 08:31

## 2024-11-10 RX ADMIN — Medication 1 CAPSULE: at 08:31

## 2024-11-10 ASSESSMENT — PAIN SCALES - WONG BAKER
WONGBAKER_NUMERICALRESPONSE: NO HURT

## 2024-11-10 ASSESSMENT — PAIN SCALES - GENERAL
PAINLEVEL_OUTOF10: 0
PAINLEVEL_OUTOF10: 0

## 2024-11-10 NOTE — PLAN OF CARE
Problem: Pain  Goal: Verbalizes/displays adequate comfort level or baseline comfort level  Outcome: Progressing     Problem: Safety - Adult  Goal: Free from fall injury  Outcome: Progressing     Problem: Cardiovascular - Adult  Goal: Absence of cardiac dysrhythmias or at baseline  Outcome: Progressing     Problem: Gastrointestinal - Adult  Goal: Minimal or absence of nausea and vomiting  Outcome: Progressing  Goal: Maintains adequate nutritional intake  Outcome: Progressing     Problem: Nutrition Deficit:  Goal: Optimize nutritional status  Outcome: Progressing     Problem: Discharge Planning  Goal: Discharge to home or other facility with appropriate resources  Outcome: Progressing  Flowsheets (Taken 11/9/2024 1515 by Marlene Pena, RN)  Discharge to home or other facility with appropriate resources: Identify barriers to discharge with patient and caregiver

## 2024-11-10 NOTE — PROGRESS NOTES
ONCOLOGY HEMATOLOGY CARE PROGRESS NOTE      HPI:    The patient known to me from the office  She has endometrioid carcinoma of the right ovary.  She underwent diagnostic laparoscopy and computer assisted total hysterectomy, bilateral salpingo-oophorectomy and extraction of surgical specimen.  This was staged as pT2a pN0 FIGO stage IIa.  Adjuvant chemotherapy with carboplatin and Taxol was started in September 2024.  On 10/21/2024 she received second cycle of carboplatin, paclitaxel and she received pegfilgrastim on 10/22/2024  She was sent to the emergency room from the office due to sudden onset lower abdominal pain  CT abdomen and pelvis had shown multiple intra-abdominal abscesses.  She is also febrile in the hospital    IV meropenem, vancomycin and Diflucan have been started.    SUBJECTIVE:    Events noted.  No fever  Feels better.  Just had CT scan done      ROS:       OBJECTIVE        Physical    VITALS:  Patient Vitals for the past 24 hrs:   BP Temp Temp src Pulse Resp SpO2   10/28/24 0454 -- -- -- -- 18 --   10/28/24 0430 97/60 98.8 °F (37.1 °C) Oral 77 18 97 %   10/28/24 0424 -- -- -- -- 18 --   10/28/24 0007 100/68 98.7 °F (37.1 °C) Oral 75 18 98 %   10/28/24 0000 -- -- -- 76 -- --   10/27/24 2233 -- -- -- -- 18 --   10/27/24 2200 112/69 97.9 °F (36.6 °C) Oral 86 16 99 %   10/27/24 1715 123/75 98.6 °F (37 °C) Oral 84 18 --   10/27/24 1200 106/67 98.4 °F (36.9 °C) Oral 77 18 99 %   10/27/24 1055 -- -- -- -- 18 --   10/27/24 1030 103/63 97.9 °F (36.6 °C) Oral 73 18 98 %       24HR INTAKE/OUTPUT:    Intake/Output Summary (Last 24 hours) at 10/28/2024 0803  Last data filed at 10/27/2024 1025  Gross per 24 hour   Intake 10 ml   Output --   Net 10 ml       Conscious alert and oriented.  No pallor or icterus  Neck is supple  Respiratory efforts are normal  Abdomen mild distention noted.  Extremities no edema  Neuro no focal deficits noted    DATA:  CBC:    Recent Labs     
                                      ONCOLOGY HEMATOLOGY CARE PROGRESS NOTE      HPI:    The patient known to me from the office  She has endometrioid carcinoma of the right ovary.  She underwent diagnostic laparoscopy and computer assisted total hysterectomy, bilateral salpingo-oophorectomy and extraction of surgical specimen.  This was staged as pT2a pN0 FIGO stage IIa.  Adjuvant chemotherapy with carboplatin and Taxol was started in September 2024.  On 10/21/2024 she received second cycle of carboplatin, paclitaxel and she received pegfilgrastim on 10/22/2024  She was sent to the emergency room from the office due to sudden onset lower abdominal pain  CT abdomen and pelvis had shown multiple intra-abdominal abscesses.  She is also febrile in the hospital    IV meropenem, vancomycin and Diflucan have been started.    SUBJECTIVE:    Feels okay.  Abdominal pain has improved.  Has tingling in the extremities      ROS:       OBJECTIVE        Physical    VITALS:  Patient Vitals for the past 24 hrs:   BP Temp Temp src Pulse Resp SpO2   10/25/24 1112 139/76 98.5 °F (36.9 °C) Oral 92 16 97 %   10/25/24 0851 132/75 98.1 °F (36.7 °C) Oral 86 16 97 %   10/25/24 0515 -- -- -- -- 16 --   10/25/24 0427 119/78 97.7 °F (36.5 °C) Oral 89 15 99 %   10/24/24 2315 111/72 98.5 °F (36.9 °C) Oral 93 14 96 %   10/24/24 2128 -- -- -- -- 16 --   10/24/24 2045 128/77 100.2 °F (37.9 °C) Oral 96 14 96 %   10/24/24 1711 118/72 100.4 °F (38 °C) Oral 91 14 96 %   10/24/24 1605 -- -- -- -- 16 --   10/24/24 1535 -- -- -- -- 14 --       24HR INTAKE/OUTPUT:  No intake or output data in the 24 hours ending 10/25/24 1334    Conscious alert and oriented.  No pallor or icterus  Neck is supple  Respiratory efforts are normal  Abdomen mild distention noted.  Extremities no edema  Neuro no focal deficits noted    DATA:  CBC:    Recent Labs     10/25/24  0510 10/24/24  0520 10/23/24  0536 10/22/24  1723   WBC 25.1* 27.6* 36.4* 20.7*   NEUTROABS 24.1*  --  
               [facility]        Progress Note    Patient Name: Stephani Reyes  MRN: [id number]  YOB: 1970  Date of Evaluation: 10/27/2024    Chief Complaint: Abdominal pain    Subjective:  No acute events overnight  Reports increased cramping pain early this morning, better at present and overall improved  No nausea or vomiting, tolerating full liquids  Passing stool, less bloating  Ambulates without difficulty      Vital Signs:  Patient Vitals for the past 24 hrs:   BP Temp Temp src Pulse Resp SpO2   10/27/24 0643 -- -- -- -- 16 --   10/27/24 0415 107/66 98.9 °F (37.2 °C) Oral 70 18 98 %   10/27/24 0403 -- -- -- -- 16 --   10/27/24 0333 -- -- -- -- 18 --   10/27/24 0001 106/69 98.4 °F (36.9 °C) Oral 81 18 99 %   10/26/24 2000 118/79 99 °F (37.2 °C) Oral 80 18 96 %   10/26/24 1721 121/72 98.9 °F (37.2 °C) Oral 80 18 98 %   10/26/24 1550 -- -- -- -- 18 --   10/26/24 1219 105/66 98.4 °F (36.9 °C) Oral 76 18 96 %   10/26/24 1124 -- -- -- -- 18 --   10/26/24 1030 110/66 97.6 °F (36.4 °C) Oral 76 18 97 %      TEMPERATURE HISTORY 24H: Temp (24hrs), Av.5 °F (36.9 °C), Min:97.6 °F (36.4 °C), Max:99 °F (37.2 °C)    BLOOD PRESSURE HISTORY: Systolic (36hrs), Av , Min:105 , Max:122    Diastolic (36hrs), Av, Min:66, Max:79      Intake/Output:  I/O last 3 completed shifts:  In: 10 [I.V.:10]  Out: -   No intake/output data recorded.  Drain/tube Output:       Physical Exam:  General: awake, alert, oriented to person, place, time  Cardiovascular:  regular rate and rhythm and no murmur noted  Lungs: clear to auscultation  Abdomen: soft, minimally distended, mild left lower quadrant tenderness only, bowel sounds active     Labs:  CBC:    Recent Labs     10/25/24  0510 10/26/24  0433 10/27/24  0433   WBC 25.1* 15.3* 10.7   HGB 9.8* 9.4* 9.5*   HCT 30.3* 27.7* 28.8*    210 239     BMP:    Recent Labs     10/25/24  0510 10/26/24  0433 10/27/24  0433   * 132* 134* 
               [facility]        Progress Note    Patient Name: Stephani Reyes  MRN: [id number]  YOB: 1970  Date of Evaluation: 2024    Postoperative Day #8, 2    Subjective:  No acute events overnight  Pain controlled  No nausea or vomiting, tolerating regular diet  Passing flatus and stool into ostomy, denies bloating  Resting in bed at this time      Vital Signs:  Patient Vitals for the past 24 hrs:   BP Temp Temp src Pulse Resp SpO2   24 0945 125/78 97.7 °F (36.5 °C) Oral 76 18 100 %   24 0730 137/86 97.8 °F (36.6 °C) Oral 68 16 99 %   24 0415 (!) 141/86 97.7 °F (36.5 °C) Oral 65 16 99 %   24 2345 130/80 98.8 °F (37.1 °C) Oral 70 16 98 %   24 1945 127/82 98.2 °F (36.8 °C) Oral 70 18 99 %   24 1620 135/79 97.7 °F (36.5 °C) Oral 69 16 99 %      TEMPERATURE HISTORY 24H: Temp (24hrs), Av °F (36.7 °C), Min:97.7 °F (36.5 °C), Max:98.8 °F (37.1 °C)    BLOOD PRESSURE HISTORY: Systolic (36hrs), Av , Min:118 , Max:146    Diastolic (36hrs), Av, Min:74, Max:86      Intake/Output:  I/O last 3 completed shifts:  In: 1100 [P.O.:600; I.V.:500]  Out: 0   No intake/output data recorded.  Drain/tube Output:  [REMOVED] Closed/Suction Drain Right RLQ Bulb-Output (ml): 10 ml    Physical Exam:  General: awake, alert, no acute distress  Abdomen: soft, non-distended, appropriate incisional tenderness only, stoma is pink/viable with loose brown enteric output  Skin/Wound: BRIGIDA dressing in place, seal/suction intact    Labs:  CBC:    Recent Labs     24  0421 24  0358 24  0513 24  1026   WBC 5.1 7.1 3.9*  --    HGB 8.2* 8.0* 7.4* 7.8*   HCT 24.6* 23.7* 22.4* 24.1*    258 235  --      BMP:    Recent Labs     24  0421 24  0358 24  0513    137 139   K 3.3* 4.8 3.6   CL 99 104 105   CO2 31 24 26   BUN 7 9 8   CREATININE 0.4* 0.5* 0.5*   GLUCOSE 125* 191* 106*     Hepatic:    Recent Labs     
               [facility]        Progress Note    Patient Name: Stephani Reyes  MRN: [id number]  YOB: 1970  Date of Evaluation: 2024    Postoperative Day #4    Subjective:  No acute events overnight  Pain controlled  Having some nausea with NGT, no vomiting, low output  Passing some flatus but no stool into ostomy  Resting in bed at this time      Vital Signs:  Patient Vitals for the past 24 hrs:   BP Temp Temp src Pulse Resp SpO2 Height   24 1415 135/80 98.3 °F (36.8 °C) Oral 88 16 98 % --   24 0915 -- -- -- -- -- -- 1.448 m (4' 9.01\")   24 0845 121/77 99.3 °F (37.4 °C) Oral 89 16 98 % --   24 0453 134/77 99 °F (37.2 °C) Oral 88 16 98 % --   24 0007 (!) 143/83 98.9 °F (37.2 °C) Oral 91 18 98 % --   24 (!) 147/56 98 °F (36.7 °C) Oral 98 16 98 % --      TEMPERATURE HISTORY 24H: Temp (24hrs), Av.7 °F (37.1 °C), Min:98 °F (36.7 °C), Max:99.3 °F (37.4 °C)    BLOOD PRESSURE HISTORY: Systolic (36hrs), Av , Min:121 , Max:147    Diastolic (36hrs), Av, Min:56, Max:83      Intake/Output:  I/O last 3 completed shifts:  In: -   Out: 955 [Emesis/NG output:850; Drains:105]  I/O this shift:  In: 70 [NG/GT:70]  Out: 180 [Emesis/NG output:170; Drains:10]  Drain/tube Output:  Closed/Suction Drain Right RLQ Bulb-Output (ml): 10 ml    Physical Exam:  General: awake, alert, no acute distress  Abdomen: soft, non-distended, appropriate incisional tenderness only, stoma is pink and viable without enteric output, NAVARRO serosanguineous, midline wound open and clean with intact fascia    Labs:  CBC:    Recent Labs     24  0730 24  0332 24  0507   WBC 12.8* 10.4 10.0   HGB 8.4* 8.1* 8.5*   HCT 25.8* 24.8* 24.9*    337 344     BMP:    Recent Labs     24  0730 24  0332 24  0507   * 136 133*   K 4.5 3.7 3.4*    98* 92*   CO2 26 26 30   BUN 28* 9 6*   CREATININE 0.7 0.4* 0.4*   GLUCOSE 118* 
               [facility]        Progress Note    Patient Name: Stephani Reyes  MRN: [id number]  YOB: 1970  Date of Evaluation: 2024    Postoperative Day # 7,1    Subjective:  No acute events overnight  Pain controlled  No nausea or emesis  Passing some flatus and small loose stool into ostomy  Up to chair at this time      Vital Signs:  Patient Vitals for the past 24 hrs:   BP Temp Temp src Pulse Resp SpO2 Height Weight   24 1133 (!) 146/80 97.7 °F (36.5 °C) Oral 84 16 98 % -- --   24 0943 -- -- -- -- -- -- 1.448 m (4' 9.01\") --   24 0845 120/77 97.7 °F (36.5 °C) Oral 78 18 99 % -- --   24 0438 -- -- -- -- -- -- -- 39 kg (85 lb 15.7 oz)   24 0337 118/74 97.7 °F (36.5 °C) Oral 76 16 97 % -- --   24 2308 131/72 98.3 °F (36.8 °C) Oral 86 18 96 % -- --   245 -- -- -- -- -- 95 % -- --   24 (!) 150/85 98.6 °F (37 °C) Oral 85 20 -- -- --   24 (!) 158/98 -- -- 89 22 96 % -- --   24 (!) 165/102 -- -- 100 22 96 % -- --   24 (!) 165/106 -- -- 97 20 95 % -- --   24 (!) 170/101 -- -- 96 17 97 % -- --   24 (!) 173/99 -- -- 99 18 99 % -- --   24 (!) 164/116 -- -- 94 18 100 % -- --   24 (!) 171/101 -- -- 97 22 98 % -- --   24 -- 98 °F (36.7 °C) Oral 87 17 96 % -- --   24 1709 (!) 141/89 98.4 °F (36.9 °C) Oral 81 16 100 % -- --      TEMPERATURE HISTORY 24H: Temp (24hrs), Av.1 °F (36.7 °C), Min:97.7 °F (36.5 °C), Max:98.6 °F (37 °C)    BLOOD PRESSURE HISTORY: Systolic (36hrs), Av , Min:118 , Max:173    Diastolic (36hrs), Av, Min:72, Max:116      Intake/Output:  I/O last 3 completed shifts:  In: 680 [P.O.:180; I.V.:500]  Out: 0   No intake/output data recorded.      Physical Exam:  General: awake, alert, no acute distress  Abdomen: soft, non-distended, appropriate incisional tenderness only, stoma is pink/viable with 
               [facility]        Progress Note    Patient Name: Stephani Reyes  MRN: [id number]  YOB: 1970  Date of Evaluation: 10/26/2024    Chief Complaint: Abdominal pain    Subjective:  No acute events overnight  Pain controlled and improving  No nausea or vomiting, tolerated clear liquids, wants more to eat  Passing stool, less bloating  Ambulates without difficulty      Vital Signs:  Patient Vitals for the past 24 hrs:   BP Temp Temp src Pulse Resp SpO2   10/26/24 0504 -- -- -- -- 16 --   10/26/24 0415 118/70 98.8 °F (37.1 °C) Oral 82 16 --   10/26/24 0154 -- -- -- -- 16 --   10/26/24 0124 -- -- -- -- 16 --   10/26/24 0006 122/74 99.4 °F (37.4 °C) Oral 80 16 96 %   10/25/24 2116 -- -- -- -- 16 --   10/25/24 2030 (!) 156/72 99.2 °F (37.3 °C) Oral 84 16 98 %   10/25/24 1700 138/77 98.7 °F (37.1 °C) Oral 86 16 100 %   10/25/24 1112 139/76 98.5 °F (36.9 °C) Oral 92 16 97 %   10/25/24 0851 132/75 98.1 °F (36.7 °C) Oral 86 16 97 %      TEMPERATURE HISTORY 24H: Temp (24hrs), Av.8 °F (37.1 °C), Min:98.1 °F (36.7 °C), Max:99.4 °F (37.4 °C)    BLOOD PRESSURE HISTORY: Systolic (36hrs), Av , Min:111 , Max:156    Diastolic (36hrs), Av, Min:70, Max:78      Intake/Output:  No intake/output data recorded.  No intake/output data recorded.  Drain/tube Output:       Physical Exam:  General: awake, alert, oriented to person, place, time  Cardiovascular:  regular rate and rhythm and no murmur noted  Lungs: clear to auscultation  Abdomen: soft, mildly distended, mild left lower quadrant tenderness only, bowel sounds active     Labs:  CBC:    Recent Labs     10/24/24  0520 10/25/24  0510 10/26/24  0433   WBC 27.6* 25.1* 15.3*   HGB 8.8* 9.8* 9.4*   HCT 26.4* 30.3* 27.7*   * 182 210     BMP:    Recent Labs     10/24/24  0520 10/25/24  0510 10/26/24  0433   * 134* 132*   K 3.3* 3.7 3.4*    103 99   CO2 21 19* 25   BUN 11 10 7   CREATININE <0.5* <0.5* <0.5* 
               [facility]        Progress Note    Patient Name: Stephani Reyes  MRN: [id number]  YOB: 1970  Date of Evaluation: 2024    Postoperative Day # 9, 3    Subjective:  No acute events overnight  Denies pain  No nausea or vomiting, tolerating regular diet  Passing flatus and stool into ostomy, denies bloating  Resting in bed at this time      Vital Signs:  Patient Vitals for the past 24 hrs:   BP Temp Temp src Pulse Resp SpO2   24 0845 114/71 97.7 °F (36.5 °C) Oral 86 18 --   24 0500 109/72 98 °F (36.7 °C) Oral 84 16 96 %   24 0110 125/83 98 °F (36.7 °C) Oral 71 16 98 %   24 2016 138/82 98.2 °F (36.8 °C) Oral 71 16 98 %   24 1800 (!) 142/88 97.9 °F (36.6 °C) Oral 73 18 --   24 1400 130/78 98 °F (36.7 °C) Oral 74 18 --      TEMPERATURE HISTORY 24H: Temp (24hrs), Av °F (36.7 °C), Min:97.7 °F (36.5 °C), Max:98.2 °F (36.8 °C)    BLOOD PRESSURE HISTORY: Systolic (36hrs), Av , Min:109 , Max:142    Diastolic (36hrs), Av, Min:71, Max:88      Intake/Output:  I/O last 3 completed shifts:  In: 360 [P.O.:360]  Out: -   No intake/output data recorded.      Physical Exam:  General: awake, alert, no acute distress  Abdomen: soft, non-distended, non-tender, stoma is pink/viable with loose brown enteric output  Skin/Wound: BRIGIDA dressing in place, covering incision    Labs:  CBC:    Recent Labs     24  0358 24  0513 24  1026 24  0641   WBC 7.1 3.9*  --  3.1*   HGB 8.0* 7.4* 7.8* 8.2*   HCT 23.7* 22.4* 24.1* 25.3*    235  --  240     BMP:    Recent Labs     24  0358 24  0513 24  0615    139 140   K 4.8 3.6 3.9    105 103   CO2 24 26 28   BUN 9 8 6*   CREATININE 0.5* 0.5* 0.5*   GLUCOSE 191* 106* 105*     Hepatic:    Recent Labs     24  0358 24  0513 24  0615   AST 10* 11*  12* 13*   ALT 6* <5*  <5* 9*   BILITOT <0.2 <0.2  <0.2 <0.2   ALKPHOS 77 68 
               [facility]        Progress Note    Patient Name: Stephani Reyes  MRN: [id number]  YOB: 1970  Date of Evaluation: 11/3/2024    Postoperative Day #3    Subjective:  No acute events overnight  Pain controlled  No nausea with NG, complains of throat pain  No flatus or stool into ostomy    Vital Signs:  Patient Vitals for the past 24 hrs:   BP Temp Temp src Pulse Resp SpO2   24 1210 (!) 147/83 98 °F (36.7 °C) Oral 81 16 99 %   24 0757 136/81 98.3 °F (36.8 °C) Oral 87 17 97 %   24 0429 135/82 98.7 °F (37.1 °C) Oral 91 16 97 %   24 0018 (!) 145/69 98 °F (36.7 °C) Oral 91 16 97 %   24 2020 (!) 159/88 98 °F (36.7 °C) Oral 87 18 98 %   24 1617 (!) 150/79 98.7 °F (37.1 °C) Oral 90 16 97 %   24 1348 -- -- -- -- 16 --   24 1315 (!) 150/83 97.8 °F (36.6 °C) Oral 91 16 99 %      TEMPERATURE HISTORY 24H: Temp (24hrs), Av.2 °F (36.8 °C), Min:97.8 °F (36.6 °C), Max:98.7 °F (37.1 °C)    BLOOD PRESSURE HISTORY: Systolic (36hrs), Av , Min:115 , Max:159    Diastolic (36hrs), Av, Min:69, Max:88      Intake/Output:  I/O last 3 completed shifts:  In: 4.9 [I.V.:1001.5; IV Piggyback:1083.4]  Out: 1280 [Emesis/NG output:1100; Drains:180]  I/O this shift:  In: -   Out: 130 [Emesis/NG output:100; Drains:30]  Drain/tube Output:  Closed/Suction Drain Right RLQ Bulb-Output (ml): 30 ml    Physical Exam:  General: awake, alert, no acute distress  Abdomen: soft, non-distended, appropriate incisional tenderness only, stoma is pink and viable without gas or enteric output, NAVARRO serosanguineous, midline wound open with intact fascia    Labs:  CBC:    Recent Labs     24  0522 24  0730 24  0332   WBC 29.8* 12.8* 10.4   HGB 11.5* 8.4* 8.1*   HCT 35.9* 25.8* 24.8*   * 352 337     BMP:    Recent Labs     24  1550 24  0730 24  0332    133* 136   K 5.3* 4.5 3.7    100 98*   CO2 21 26 
               [facility]        Progress Note    Patient Name: Stephani Reyes  MRN: [id number]  YOB: 1970  Date of Evaluation: 2024    Postoperative Day #1    Subjective:  No acute events overnight  Pain controlled  No nausea or vomiting, NGT in place  No stool or flatus per colostomy  Resting in bed at this time, was up to chair earlier      Vital Signs:  Patient Vitals for the past 24 hrs:   BP Temp Temp src Pulse Resp SpO2   24 1245 139/87 97.9 °F (36.6 °C) Oral 75 16 99 %   24 0800 122/83 98.5 °F (36.9 °C) Oral 77 16 97 %   24 0515 124/78 -- -- 95 18 98 %   24 0131 -- -- -- -- 16 --   240 117/79 -- -- 98 16 98 %   10/31/24 2315 123/83 -- -- 99 16 99 %   10/31/24 2227 -- -- -- -- 16 --   10/31/24 2200 107/75 98.5 °F (36.9 °C) Axillary (!) 104 16 97 %   10/31/24 2037 117/80 97.6 °F (36.4 °C) Axillary (!) 103 16 96 %   10/31/24 2015 121/85 -- -- (!) 102 18 96 %   10/31/24 2005 -- -- -- -- 19 --   10/31/24 2000 121/85 -- -- 97 16 96 %   10/31/24 194 108/86 -- -- 95 19 96 %   10/31/24 1930 126/84 97.8 °F (36.6 °C) Temporal 97 17 98 %   10/31/24 192 119/80 -- -- 89 15 100 %   10/31/24 192 118/83 -- -- 89 17 100 %   10/31/24 191 118/79 97.6 °F (36.4 °C) Temporal 93 14 99 %      TEMPERATURE HISTORY 24H: Temp (24hrs), Av °F (36.7 °C), Min:97.6 °F (36.4 °C), Max:98.5 °F (36.9 °C)    BLOOD PRESSURE HISTORY: Systolic (36hrs), Av , Min:107 , Max:139    Diastolic (36hrs), Av, Min:74, Max:87      Intake/Output:  I/O last 3 completed shifts:  In: 2319.4 [I.V.:1110; IV Piggyback:1209.4]  Out: 600 [Urine:400; Drains:100; Blood:100]  I/O this shift:  In: -   Out: 390 [Urine:300; Drains:90]  Drain/tube Output:  Closed/Suction Drain Right RLQ Bulb-Output (ml): 90 ml    Physical Exam:  General: awake, alert, oriented to person, place, time  Lungs: unlabored respirations  Abdomen: soft, non-distended, incisional tenderness only, 
               [facility]        Progress Note    Patient Name: Stephani Reyes  MRN: [id number]  YOB: 1970  Date of Evaluation: 10/30/2024    Chief Complaint: Abdominal pain    Subjective:  No acute events overnight  Ongoing intermittent cramping pain, but controlled  No nausea or vomiting  Passing stool  Resting in bed at this time      Vital Signs:  Patient Vitals for the past 24 hrs:   BP Temp Temp src Pulse Resp SpO2 Height   10/30/24 0921 106/67 98 °F (36.7 °C) Oral 87 16 97 % --   10/30/24 0424 -- -- -- -- 18 -- --   10/30/24 0416 119/78 98.1 °F (36.7 °C) Oral 74 17 98 % --   10/30/24 0030 103/72 97.8 °F (36.6 °C) Oral 87 17 98 % --   10/29/24 2100 131/82 97.9 °F (36.6 °C) Oral 86 18 98 % --   10/29/24 1800 106/71 98.1 °F (36.7 °C) Oral 80 16 97 % --   10/29/24 1204 103/72 97.4 °F (36.3 °C) Oral 73 16 97 % --   10/29/24 1041 -- -- -- -- -- -- 1.448 m (4' 9.01\")      TEMPERATURE HISTORY 24H: Temp (24hrs), Av.9 °F (36.6 °C), Min:97.4 °F (36.3 °C), Max:98.1 °F (36.7 °C)    BLOOD PRESSURE HISTORY: Systolic (36hrs), Av , Min:95 , Max:131    Diastolic (36hrs), Av, Min:61, Max:82      Intake/Output:  No intake/output data recorded.  No intake/output data recorded.  Drain/tube Output:       Physical Exam:  General: awake, alert, oriented to person, place, time  Cardiovascular:  regular rate and rhythm and no murmur noted  Lungs: clear to auscultation  Abdomen: soft, non-distended, mild left lower quadrant tenderness only, bowel sounds active     Labs:  CBC:    Recent Labs     10/28/24  0510 10/4 10/30/24  0530   WBC 9.2 8.4 10.2   HGB 9.6* 10.0* 10.6*   HCT 28.3* 29.5* 31.0*    379 469*     BMP:    Recent Labs     10/28/24  0510 10/29/24  0354 10/30/24  0530   * 137 133*   K 4.2 4.2 4.2   CL 97* 97* 93*   CO2 28 30 27   BUN 7 10 14   CREATININE <0.5* <0.5* 0.4*   GLUCOSE 116* 116* 87     Hepatic:    Recent Labs     10/28/24  0510 
               [facility]        Progress Note    Patient Name: Stephani Reyes  MRN: [id number]  YOB: 1970  Date of Evaluation: 2024    Postoperative Day #5    Subjective:  No acute events overnight  Pain controlled  No further nausea since NGT removed, no vomiting  Passing some flatus and small loose stool into ostomy  Up to chair at this time      Vital Signs:  Patient Vitals for the past 24 hrs:   BP Temp Temp src Pulse Resp SpO2 Weight   24 1415 -- -- -- -- -- -- 37.6 kg (82 lb 12.8 oz)   24 1230 121/78 97.8 °F (36.6 °C) Oral 83 16 98 % --   24 1042 124/84 -- -- 92 18 97 % --   24 0830 136/82 98.2 °F (36.8 °C) Oral 83 16 97 % --   24 0558 123/71 98.3 °F (36.8 °C) Oral 72 16 97 % --   24 0521 127/79 98.2 °F (36.8 °C) Oral 76 16 97 % --   24 0322 -- -- -- -- 16 -- --   24 1946 137/79 98.2 °F (36.8 °C) Oral 77 16 97 % --   24 1831 -- -- -- -- 16 -- --   24 1745 122/78 98.1 °F (36.7 °C) Oral 77 16 -- --      TEMPERATURE HISTORY 24H: Temp (24hrs), Av.1 °F (36.7 °C), Min:97.8 °F (36.6 °C), Max:98.3 °F (36.8 °C)    BLOOD PRESSURE HISTORY: Systolic (36hrs), Av , Min:121 , Max:137    Diastolic (36hrs), Av, Min:71, Max:84      Intake/Output:  I/O last 3 completed shifts:  In: 190 [P.O.:120; NG/GT:70]  Out: 180 [Emesis/NG output:170; Drains:10]  No intake/output data recorded.  Drain/tube Output:  Closed/Suction Drain Right RLQ Bulb-Output (ml): 10 ml    Physical Exam:  General: awake, alert, no acute distress  Abdomen: soft, non-distended, appropriate incisional tenderness only, stoma is pink/viable with loose brown/green enteric output  Drain: NAVARRO serosanguineous, low-output--removed  Skin/Wound: midline wound open and clean with intact fascia--dressing changed    Labs:  CBC:    Recent Labs     24  0332 24  0507 24  0601   WBC 10.4 10.0 8.1   HGB 8.1* 8.5* 8.2*   HCT 24.8* 24.9* 
               [facility]        Progress Note    Patient Name: Stephani Reyes  MRN: [id number]  YOB: 1970  Date of Evaluation: 10/28/2024    Chief Complaint: Abdominal pain    Subjective:  No acute events overnight  Still with intermittent cramping pain, but controlled and improving  No nausea or vomiting, tolerating low fiber diet  Passing stool, denies bloating  Ambulates without difficulty      Vital Signs:  Patient Vitals for the past 24 hrs:   BP Temp Temp src Pulse Resp SpO2   10/28/24 0902 102/67 98.8 °F (37.1 °C) Oral 83 16 97 %   10/28/24 0454 -- -- -- -- 18 --   10/28/24 0430 97/60 98.8 °F (37.1 °C) Oral 77 18 97 %   10/28/24 0424 -- -- -- -- 18 --   10/28/24 0007 100/68 98.7 °F (37.1 °C) Oral 75 18 98 %   10/28/24 0000 -- -- -- 76 -- --   10/27/24 2233 -- -- -- -- 18 --   10/27/24 2200 112/69 97.9 °F (36.6 °C) Oral 86 16 99 %   10/27/24 1715 123/75 98.6 °F (37 °C) Oral 84 18 --   10/27/24 1200 106/67 98.4 °F (36.9 °C) Oral 77 18 99 %   10/27/24 1055 -- -- -- -- 18 --   10/27/24 1030 103/63 97.9 °F (36.6 °C) Oral 73 18 98 %      TEMPERATURE HISTORY 24H: Temp (24hrs), Av.4 °F (36.9 °C), Min:97.9 °F (36.6 °C), Max:98.8 °F (37.1 °C)    BLOOD PRESSURE HISTORY: Systolic (36hrs), Av , Min:97 , Max:123    Diastolic (36hrs), Av, Min:60, Max:75      Intake/Output:  I/O last 3 completed shifts:  In: 10 [I.V.:10]  Out: -   No intake/output data recorded.  Drain/tube Output:       Physical Exam:  General: awake, alert, oriented to person, place, time  Cardiovascular:  regular rate and rhythm and no murmur noted  Lungs: clear to auscultation  Abdomen: soft, non-distended, mild left lower quadrant tenderness only, bowel sounds active     Labs:  CBC:    Recent Labs     10/26/24  0433 10/27/24  0433 10/28/24  0510   WBC 15.3* 10.7 9.2   HGB 9.4* 9.5* 9.6*   HCT 27.7* 28.8* 28.3*    239 295     BMP:    Recent Labs     10/26/24  0433 10/27/24  0433 
               [facility]        Progress Note    Patient Name: Stephani Reyes  MRN: [id number]  YOB: 1970  Date of Evaluation: 2024    Postoperative Day #2    Subjective:  No acute events overnight  Pain controlled  No nausea with NG  No flatus or stool into ostomy    Vital Signs:  Patient Vitals for the past 24 hrs:   BP Temp Temp src Pulse Resp SpO2   24 1315 (!) 150/83 97.8 °F (36.6 °C) Oral 91 16 99 %   24 0900 133/74 97.4 °F (36.3 °C) Oral 73 18 99 %   24 0513 115/73 98.2 °F (36.8 °C) Oral 76 -- 98 %   24 0007 127/82 98.6 °F (37 °C) Oral 73 16 97 %   24 120/73 98.7 °F (37.1 °C) Oral 72 18 97 %   24 1600 (!) 145/85 98 °F (36.7 °C) Axillary 87 16 99 %      TEMPERATURE HISTORY 24H: Temp (24hrs), Av.1 °F (36.7 °C), Min:97.4 °F (36.3 °C), Max:98.7 °F (37.1 °C)    BLOOD PRESSURE HISTORY: Systolic (36hrs), Av , Min:115 , Max:150    Diastolic (36hrs), Av, Min:73, Max:87      Intake/Output:  I/O last 3 completed shifts:  In: 100 [I.V.:100]  Out: 1230 [Urine:700; Emesis/NG output:200; Drains:230; Blood:100]  I/O this shift:  In: -   Out: 45 [Drains:45]  Drain/tube Output:  Closed/Suction Drain Right RLQ Bulb-Output (ml): 15 ml    Physical Exam:  General: awake, alert, no acute distress  Abdomen: soft, non-distended, appropriate incisional tenderness only, stoma is pink and viable without gas or enteric output, NAVARRO serosanguineous, midline wound open with infact fascia    Labs:  CBC:    Recent Labs     10/31/24  0525 24  0522 24  0730   WBC 11.1* 29.8* 12.8*   HGB 10.2* 11.5* 8.4*   HCT 31.1* 35.9* 25.8*   * 522* 352     BMP:    Recent Labs     24  0526 24  1550 24  07    138 133*   K 5.6* 5.3* 4.5   CL 99 102 100   CO2 17* 21 26   BUN 27* 36* 28*   CREATININE 1.3* 1.4* 0.7   GLUCOSE 153* 154* 118*     Hepatic:    Recent Labs     10/31/24  0525 24  0526 24  07 
      Admit Date: 10/22/2024  Diet: ADULT DIET; Clear Liquid  ADULT ORAL NUTRITION SUPPLEMENT; Breakfast, Lunch, Dinner; Clear Liquid Oral Supplement    CC: Abdominal abscess    Interval history:   Overnight, patient was febrile with a Tmax of 100.4 at 1711.  Other vitals remained stable.    Patient was seen this morning. I discussed the plan of the day with her in detail. All questions were addressed and answered to patient's satisfaction. Pt really wanted something to eat.   Patient denies fevers, chills, nausea, vomiting, chest pain, shortness of breath, diarrhea, constipation, dysuria, urinary frequency or urgency.     Plan:     -Continue IV antibiotics: Merrem, Vanco, Diflucan  -Probiotics  -IV fluid hydration  -KUB per Gen Sx    Assessment:   Stephani Reyes is a 53 y.o. female with PMH of stage IIa endometrioid ovarian cancer, recurrent pleural effusions who was admitted with sepsis secondary to multiple abdominal abscess      Sepsis secondary to Multiple Abdominal Abscesses  Admit inpatient with telemetry  , Lactic acid 2.6, WBC 20.7   NPO until surgical assesmemnt  IV pain medications  Surgery consulted by ER  IV antibiotics meropenem vanc and  fluconazole  Blood cx     Stage IIA Endometrioid Ovarian Cancer S/P RATH, BSO  Follows with [facility]  Receiving chemotherapy  Consult Oncology     Hx Recurrent Pleural Effusions  Denies any shortness of breath     Code Status: Full Code   FEN: ADULT DIET; Clear Liquid  ADULT ORAL NUTRITION SUPPLEMENT; Breakfast, Lunch, Dinner; Clear Liquid Oral Supplement   DVT PPX: [x]Lovenox, []Heparin, []Coumadin, []Eliquis, []Xarelto, []SCD  DISPO: Continue management of acute issues     Ravin Rosales MD  10/25/2024,  12:33 PM    This note was likely completed using voice recognition technology and may contain unintended errors.     Objective:   Vitals:   T-max:  Patient Vitals for the past 8 hrs:   BP Temp Temp src Pulse Resp SpO2   10/25/24 1112 139/76 98.5 °F (36.9 °C) Oral 
      Admit Date: 10/22/2024  Diet: ADULT DIET; Clear Liquid; Low Fiber  ADULT ORAL NUTRITION SUPPLEMENT; Breakfast, Lunch, Dinner; Clear Liquid Oral Supplement  Diet NPO    CC: Abdominal abscess    Interval history:   Patient with tolerable abdominal pain.  No CP, SOB, HA, fevers or diarrhea.    Plan:     -Continue IV Merrem, Diflucan  -CT Ab/P noted  -Enema imaging per Surgery  -CLD per Surgery  -May need colonoscopy or ex lap  -Lasix 20 mg IV bid X 3 doses for BL effusions  -ID and GI following    Assessment:   Stephani Reyes is a 53 y.o. female with PMH of stage IIa endometrioid ovarian cancer, recurrent pleural effusions who was admitted with sepsis secondary to multiple abdominal abscess      Sepsis secondary to Multiple Abdominal Abscesses  Continue IV Merrem and IV Diflucan  Enema imaging per Surgery  May need colonoscopy or ex lap given worsening CT Ab/P  CLD per Surgery  No plan for surgical intervention this weekend  ID following     Stage IIA Endometrioid Ovarian Cancer S/P RATH, BSO  Follows with [facility]  Receiving chemotherapy  Onc input appreciated     Hx Recurrent Pleural Effusions  Start Lasix 20 mg IV bid X 3 doses given CT findings    Code Status: Full Code   FEN: ADULT DIET; Clear Liquid; Low Fiber  ADULT ORAL NUTRITION SUPPLEMENT; Breakfast, Lunch, Dinner; Clear Liquid Oral Supplement  Diet NPO   DVT PPX: [x]Lovenox, []Heparin, []Coumadin, []Eliquis, []Xarelto, []SCD  DISPO: Home    Discussed with patient, nursing and CM.    Discussed with Edith Gastelum (Surgery NP).  Enema imaging.  May need colonoscopy or ex lap.    Discussed with .    She will need IV Abx upon DC.  NOT ready for DC given worsening CT findings.    Syed Morrsion MD  10/28/2024,  2:31 PM      Objective:   Vitals:   T-max:  Patient Vitals for the past 8 hrs:   BP Temp Temp src Pulse Resp SpO2   10/28/24 1140 108/79 97.9 °F (36.6 °C) Oral 86 16 98 %   10/28/24 1120 -- -- -- -- 18 --   10/28/24 1050 -- -- -- -- 18 --   10/28/24 
      Admit Date: 10/22/2024  Diet: ADULT DIET; Full Liquid  ADULT ORAL NUTRITION SUPPLEMENT; Breakfast, Lunch, Dinner; Standard High Calorie/High Protein Oral Supplement    CC: Abdominal abscess    Interval history:   Patient with abdominal pain.  No CP, SOB, HA, fevers or diarrhea.    Plan:     -Continue IV Merrem, Vanco, Diflucan  -FLD per Surgery  -ID and GI following    Assessment:   Stephani Reyes is a 53 y.o. female with PMH of stage IIa endometrioid ovarian cancer, recurrent pleural effusions who was admitted with sepsis secondary to multiple abdominal abscess      Sepsis secondary to Multiple Abdominal Abscesses  Continue IV Merrem, IV Vanco and IV Diflucan  FLD per Surgery  No plan for surgical intervention this weekend  ID following     Stage IIA Endometrioid Ovarian Cancer S/P RATH, BSO  Follows with [facility]  Receiving chemotherapy  Onc input appreciated     Hx Recurrent Pleural Effusions  Denies any shortness of breath     Code Status: Full Code   FEN: ADULT DIET; Full Liquid  ADULT ORAL NUTRITION SUPPLEMENT; Breakfast, Lunch, Dinner; Standard High Calorie/High Protein Oral Supplement   DVT PPX: [x]Lovenox, []Heparin, []Coumadin, []Eliquis, []Xarelto, []SCD  DISPO: Home    Discussed with patient and nursing.    Would need drainage of abscesses if not improving with IV Abx.    Syed Morrison MD  10/26/2024,  4:32 PM      Objective:   Vitals:   T-max:  Patient Vitals for the past 8 hrs:   BP Temp Temp src Pulse Resp SpO2   10/26/24 1219 105/66 98.4 °F (36.9 °C) Oral 76 18 96 %   10/26/24 1030 110/66 97.6 °F (36.4 °C) Oral 76 18 97 %       Intake/Output Summary (Last 24 hours) at 10/26/2024 1632  Last data filed at 10/26/2024 1054  Gross per 24 hour   Intake 10 ml   Output --   Net 10 ml         Physical Exam  Constitutional:       Appearance: She is thin  HENT:      Head: Normocephalic.      Nose: Nose normal.      Mouth/Throat:      Mouth: Mucous membranes are moist.   Eyes:      Pupils: Pupils are equal, 
      Admit Date: 10/22/2024  Diet: ADULT ORAL NUTRITION SUPPLEMENT; Breakfast, Lunch, Dinner; Standard High Calorie/High Protein Oral Supplement  ADULT DIET; Regular; Low Fiber    CC: Abdominal abscess    Interval history:   Patient had abdominal pain overnight.  No CP, SOB, HA, fevers or diarrhea.    Plan:     -Continue IV Merrem, Vanco, Diflucan  -Regular diet per Surgery  -ID and GI following    Assessment:   Stephani Reyes is a 53 y.o. female with PMH of stage IIa endometrioid ovarian cancer, recurrent pleural effusions who was admitted with sepsis secondary to multiple abdominal abscess      Sepsis secondary to Multiple Abdominal Abscesses  Continue IV Merrem, IV Vanco and IV Diflucan  Regular diet per Surgery  No plan for surgical intervention this weekend  ID following     Stage IIA Endometrioid Ovarian Cancer S/P RATH, BSO  Follows with [facility]  Receiving chemotherapy  Onc input appreciated     Hx Recurrent Pleural Effusions  Denies any shortness of breath     Code Status: Full Code   FEN: ADULT ORAL NUTRITION SUPPLEMENT; Breakfast, Lunch, Dinner; Standard High Calorie/High Protein Oral Supplement  ADULT DIET; Regular; Low Fiber   DVT PPX: [x]Lovenox, []Heparin, []Coumadin, []Eliquis, []Xarelto, []SCD  DISPO: Home    Discussed with patient and nursing.    Discussed with .    Home tomorrow if tolerating diet and ok with all.    Syed Morrison MD  10/27/2024,  9:48 AM      Objective:   Vitals:   T-max:  Patient Vitals for the past 8 hrs:   BP Temp Temp src Pulse Resp SpO2   10/27/24 0643 -- -- -- -- 16 --   10/27/24 0415 107/66 98.9 °F (37.2 °C) Oral 70 18 98 %   10/27/24 0403 -- -- -- -- 16 --   10/27/24 0333 -- -- -- -- 18 --       Intake/Output Summary (Last 24 hours) at 10/27/2024 0948  Last data filed at 10/26/2024 1054  Gross per 24 hour   Intake 10 ml   Output --   Net 10 ml         Physical Exam  Constitutional:       Appearance: She is thin  HENT:      Head: Normocephalic.      Nose: Nose normal. 
      Admit Date: 10/22/2024  Diet: Diet NPO Exceptions are: Ice Chips, Sips of Water with Meds    CC: Abdominal abscess    Interval history:   Overnight, t patient was febrile with a Tmax of 102.6 at 1545.  Other vitals remained stable.    Patient was seen this morning. I discussed the plan of the day with her in detail. All questions were addressed and answered to patient's satisfaction.   Patient denies fevers, chills, nausea, vomiting, chest pain, shortness of breath, diarrhea, constipation, dysuria, urinary frequency or urgency.     Plan:     -Continue IV antibiotics: Merrem, Vanco, Diflucan  -Probiotics  -IV fluid hydration  -Will await any further recs from general surgery regarding abscesses    Assessment:   Stephani Reyes is a 53 y.o. female with PMH of stage IIa endometrioid ovarian cancer, recurrent pleural effusions who was admitted with sepsis secondary to multiple abdominal abscess      Sepsis secondary to Multiple Abdominal Abscesses  Admit inpatient with telemetry  , Lactic acid 2.6, WBC 20.7   NPO until surgical assesmemnt  IV pain medications  Surgery consulted by ER  IV antibiotics meropenem vanc and  fluconazole  Blood cx     Stage IIA Endometrioid Ovarian Cancer S/P RATH, BSO  Follows with [facility]  Receiving chemotherapy  Consult Oncology     Hx Recurrent Pleural Effusions  Denies any shortness of breath     Code Status: Full Code   FEN: Diet NPO Exceptions are: Ice Chips, Sips of Water with Meds   DVT PPX: [x]Lovenox, []Heparin, []Coumadin, []Eliquis, []Xarelto, []SCD  DISPO: Continue management of acute issues     Ravin Rosales MD  10/24/2024,  11:52 AM    This note was likely completed using voice recognition technology and may contain unintended errors.     Objective:   Vitals:   T-max:  Patient Vitals for the past 8 hrs:   BP Temp Temp src Pulse Resp SpO2   10/24/24 0915 103/65 98.5 °F (36.9 °C) Oral 86 16 97 %   10/24/24 0615 -- 100.3 °F (37.9 °C) Oral -- -- --   10/24/24 0357 -- -- 
     [facility]        PATIENT NAME: Stephani Reyes     TODAY'S DATE: 10/24/2024    CC: abd pain  SUBJECTIVE:    Pt has had some pain continue. No emesis. Had flatus. No CP or SOB.  Had low grade fever.     Current Medications:   Current Facility-Administered Medications: dextrose bolus 10% 125 mL, 125 mL, IntraVENous, PRN **OR** dextrose bolus 10% 250 mL, 250 mL, IntraVENous, PRN  glucagon injection 1 mg, 1 mg, SubCUTAneous, PRN  dextrose 10 % infusion, , IntraVENous, Continuous PRN  sodium phosphate 15 mmol in sodium chloride 0.9 % 250 mL IVPB, 15 mmol, IntraVENous, PRN  vancomycin (VANCOCIN) 750 mg in sodium chloride 0.9 % 250 mL IVPB ([id number]), 750 mg, IntraVENous, Q8H  lactobacillus (CULTURELLE) capsule 1 capsule, 1 capsule, Oral, BID WC  meropenem (MERREM) 1,000 mg in sodium chloride 0.9 % 100 mL IVPB (mini-bag), 1,000 mg, IntraVENous, Q8H  sodium chloride 0.9 % bolus 1,000 mL, 1,000 mL, IntraVENous, Once  fluconazole (DIFLUCAN) in 0.9 % sodium chloride IVPB 400 mg, 400 mg, IntraVENous, Q24H  sodium chloride flush 0.9 % injection 5-40 mL, 5-40 mL, IntraVENous, 2 times per day  sodium chloride flush 0.9 % injection 5-40 mL, 5-40 mL, IntraVENous, PRN  0.9 % sodium chloride infusion, , IntraVENous, PRN  potassium chloride (KLOR-CON M) extended release tablet 40 mEq, 40 mEq, Oral, PRN **OR** potassium bicarb-citric acid (EFFER-K) effervescent tablet 40 mEq, 40 mEq, Oral, PRN **OR** potassium chloride 10 mEq/100 mL IVPB (Peripheral Line), 10 mEq, IntraVENous, PRN  magnesium sulfate 2000 mg in 50 mL IVPB premix, 2,000 mg, IntraVENous, PRN  enoxaparin Sodium (LOVENOX) injection 30 mg, 30 mg, SubCUTAneous, Daily  ondansetron (ZOFRAN-ODT) disintegrating tablet 4 mg, 4 mg, Oral, Q8H PRN **OR** ondansetron (ZOFRAN) injection 4 mg, 4 mg, IntraVENous, Q6H PRN  polyethylene glycol (GLYCOLAX) packet 17 g, 17 g, Oral, Daily PRN  acetaminophen (TYLENOL) tablet 650 mg, 650 mg, Oral, Q6H PRN 
    Clinical Pharmacy Note: Pharmacy to Dose Vancomycin    Stephani Reyes is a 53 y.o. female started on Vancomycin for Intra abdominal infection; consult received from Dr. Steele to manage therapy. Also receiving the following antibiotics: Merrem.    Goal AUC: 400-600 mg/L*hr  Goal Trough Level: 15-20 mcg/mL    Assessment/Plan:  Initiate vancomycin 750 mg IV every 12 hours. Bayesian modeling predicts an AUC of 469 mg/L*hr and a trough of 10.9 mcg/mL at steady state concentration.  A vancomycin random level has been ordered for 10/24 at 0600  Changes in regimen will be determined based on culture results, renal function, and clinical response.  Pharmacy will continue to monitor and adjust regimen as necessary.    Allergies:  Crab extract, Shellfish allergy, and Shrimp extract     Recent Labs     10/22/24  1723 10/23/24  0536   CREATININE 0.6 0.7       Recent Labs     10/22/24  1723 10/23/24  0536   WBC 20.7* 36.4*       Ht Readings from Last 1 Encounters:   10/22/24 1.448 m (4' 9\")        Wt Readings from Last 1 Encounters:   10/22/24 43.4 kg (95 lb 9.6 oz)         Estimated Creatinine Clearance: 64 mL/min (based on SCr of 0.7 mg/dL).      Thank you for the consult,    Anna De La O [facility]  [id number]    
    Clinical Pharmacy Note: Pharmacy to Dose Vancomycin    Vancomycin Day: 2  Indication: Intra abdominal infection  Current Dose: 750mg q12  Dosing Method: Bayesian Modeling    Random: 10.4    Recent Labs     10/23/24  0536 10/24/24  0520   BUN 20 11       Recent Labs     10/23/24  0536 10/24/24  0520   CREATININE 0.7 <0.5*       Recent Labs     10/23/24  0536 10/24/24  0520   WBC 36.4* 27.6*         Intake/Output Summary (Last 24 hours) at 10/24/2024 1121  Last data filed at 10/24/2024 0934  Gross per 24 hour   Intake 10 ml   Output --   Net 10 ml         Ht Readings from Last 1 Encounters:   10/22/24 1.448 m (4' 9\")        Wt Readings from Last 1 Encounters:   10/22/24 43.4 kg (95 lb 9.6 oz)         Body mass index is 20.69 kg/m².    Estimated Creatinine Clearance: 89 mL/min (based on SCr of 0.5 mg/dL).      Assessment/Plan:  Vancomycin level is subtherapeutic.  Increase vancomycin regimen to 750mg every 8 hours.   Bayesian Modeling predicts an AUC of 475 mg/L*hr and trough of 12 mg/L.  A vancomycin random level has been ordered on 10/25 at 0600 for follow-up.  Changes in regimen will be determined based on culture results, renal function, and clinical response.  Pharmacy will continue to monitor and adjust regimen as necessary.    Thank you for the consult,    Anna De La O [facility]  [id number]    
    Date of Admission: 10/22/2024. Hospital Day: 12       HOSPITAL COURSE  53 y.o. female with PMH of stage IIa endometrioid ovarian cancer, recurrent pleural effusions who was admitted with sepsis secondary to multiple abdominal abscess     Interval  History:   Reports abdominal pain is improving except at incision site.  NG tube in situ but off suction.  Thinks he has been passing gas.  Remains n.p.o.  CBC reviewed WBC down to 12, hemoglobin stable 8.4.  BMP reviewed potassium down to 4.5, sodium 133      Physical Exam     /73   Pulse 76   Temp 98.2 °F (36.8 °C) (Oral)   Resp 16   Ht 1.448 m (4' 9.01\")   Wt 43.4 kg (95 lb 9.6 oz)   LMP 04/17/2013 (Approximate) Comment: No period in almost 4 years.  SpO2 98%   BMI 20.68 kg/m²     General appearance: No apparent distress, appears stated age and cooperative.  ENT.  NG tube with suction  Respiratory:  Normal respiratory effort. Clear to auscultation, bilaterally without Rales/Wheezes/Rhonchi.  Cardiovascular: Regular rate and rhythm with normal S1/S2 without murmurs, rubs or gallops.  Abd: Surgical wound covered with bandage, dry, colostomy with very minimal output          Assessment/Plan:  Sepsis secondary to Multiple Abdominal Abscesses  CT abd pelvis 10  w con on 10/28  show several abscess increased compared to 10/22   she failed medical rx and abscess note amenable to IR drainage   Reviewed surgical note from 10/31, underwent drainage of abdominal abscess and colostomy creation  Xr kub  show contrast  extravasation around mid sigmoid.    Continue IV Merrem and IV Diflucan  11/2 Jamila culture growing E coli and enterococcus, sensitivity pending    LULU/hyperkalemia/metabolic acidosis.  Improving  Creatinine bumped to 1.4, potassium 5.6 after surgery  Secondary to surgery and poor intake from being n.p.o.  IV fluid started      Stage IIA Endometrioid Ovarian Cancer S/P RATH, BSO   Chemo on hold till sepsis/abscess cleared up    Hx Recurrent Pleural 
    Date of Admission: 10/22/2024. Hospital Day: 13       HOSPITAL COURSE  53 y.o. female with PMH of stage IIa endometrioid ovarian cancer, recurrent pleural effusions who was admitted with sepsis secondary to multiple abdominal abscess .  After failed conservative treatment, underwent laparotomy with colostomy, abscess drainage on 11/1    Interval  History:   NG tube remains in place.  Still has not passed flatus yet  . Cbc reviewed,  Wbc 10, hemoglobin 8.  BMP reviewed sodium 136, potassium 3.7  Culture/repeat reviewed, Enterococcus and E. coli sensitive to beta-lactam      Physical Exam     /81   Pulse 87   Temp 98.3 °F (36.8 °C) (Oral)   Resp 17   Ht 1.448 m (4' 9.01\")   Wt 43.4 kg (95 lb 9.6 oz)   LMP 04/17/2013 (Approximate) Comment: No period in almost 4 years.  SpO2 97%   BMI 20.68 kg/m²     General appearance: No apparent distress, appears stated age and cooperative.  ENT.  NG tube with suction  Respiratory:  Normal respiratory effort. Clear to auscultation, bilaterally without Rales/Wheezes/Rhonchi.  Cardiovascular: Regular rate and rhythm with normal S1/S2 without murmurs, rubs or gallops.  Abd: Surgical wound covered with bandage, dry, colostomy with very minimal output          Assessment/Plan:  Sepsis secondary to Multiple Abdominal Abscesses  CT abd pelvis 10  w con on 10/28  show several abscess increased compared to 10/22   she failed medical rx and abscess note amenable to IR drainage   Reviewed surgical note from 10/31, underwent drainage of abdominal abscess and colostomy creation  Surgical culture growing E. coli Enterococcus, sensitive to ampicillin, switch antibiotic to Zosyn and and continue IV Diflucan  Defer further antibiotic assessment and duration to infectious disease    LULU/hyperkalemia/metabolic acidosis.  Improving  Creatinine bumped to 1.4, potassium 5.6  Secondary to surgery and poor intake from being n.p.o.  IV fluid started      Stage IIA Endometrioid Ovarian 
    Date of Admission: 10/22/2024. Hospital Day: 15       HOSPITAL COURSE  53 y.o. female with PMH of stage IIa endometrioid ovarian cancer, recurrent pleural effusions who was admitted with sepsis secondary to multiple abdominal abscess .  After failed conservative treatment, underwent laparotomy with colostomy, abscess drainage on 11/1    Interval  History:   NG tube discontinued.  Started on clear liquid diet.  Abdominal pain well-controlled.  Small liquid stool in ostomy bag.      Physical Exam     /78   Pulse 83   Temp 97.8 °F (36.6 °C) (Oral)   Resp 16   Ht 1.448 m (4' 9.01\")   Wt 37.6 kg (82 lb 12.8 oz)   LMP 04/17/2013 (Approximate) Comment: No period in almost 4 years.  SpO2 98%   BMI 17.91 kg/m²     General appearance: No apparent distress, appears stated age and cooperative.  ENT.  NG tube with suction  Respiratory:  Normal respiratory effort. Clear to auscultation, bilaterally without Rales/Wheezes/Rhonchi.  Cardiovascular: Regular rate and rhythm with normal S1/S2 without murmurs, rubs or gallops.  Abd: Surgical wound covered with bandage, dry, colostomy with very minimal output          Assessment/Plan:  Sepsis secondary to Multiple Abdominal Abscesses  CT abd pelvis 10  w con on 10/28  show several abscess increased compared to 10/22   she failed medical rx and abscess note amenable to IR drainage   Reviewed surgical note from 10/31, underwent drainage of abdominal abscess and colostomy creation  Surgical culture growing E. coli Enterococcus  Currently on IV Zosyn and IV Diflucan  Defer further antibiotic assessment and duration to infectious disease.  NG tube removed and started on clear liquid diet.  Scheduled for secondary wound closure 11/6/2024.    Moderate malnutrition  From acute and chronic illness and prolonged poor oral intake  Without clear duration of expected bowel recovery , will start parenteral nutrition via TPN  Monitor electrolytes, phosphorus and magnesium daily 
    Date of Admission: 10/22/2024. Hospital Day: 18       HOSPITAL COURSE  53 y.o. female with PMH of stage IIa endometrioid ovarian cancer, recurrent pleural effusions who was admitted with sepsis secondary to multiple abdominal abscess .  After failed conservative treatment, underwent laparotomy with colostomy, abscess drainage on 11/1    Interval  History:   Patient seen and examined.   Denies any complaints. No pain, fever or chills.    Abdominal pain well-controlled.  Small liquid stool in ostomy bag.      Physical Exam     /78   Pulse 76   Temp 97.7 °F (36.5 °C) (Oral)   Resp 18   Ht 1.448 m (4' 9.01\")   Wt 39 kg (85 lb 15.7 oz)   LMP 04/17/2013 (Approximate) Comment: No period in almost 4 years.  SpO2 100%   BMI 18.60 kg/m²     General appearance: No apparent distress, appears stated age and cooperative.  ENT.  NG tube with suction  Respiratory:  Normal respiratory effort. Clear to auscultation, bilaterally without Rales/Wheezes/Rhonchi.  Cardiovascular: Regular rate and rhythm with normal S1/S2 without murmurs, rubs or gallops.  Abd: Surgical wound covered with bandage, dry, colostomy with very minimal output  Musculoskeletal: No clubbing, cyanosis or edema bilaterally.  Full range of motion without deformity.  Skin: Skin color, texture, turgor normal.  No rashes or lesions.  Neurologic:  Neurovascularly intact without any focal sensory/motor deficits. Cranial nerves: II-XII intact, grossly non-focal.  Psychiatric: Alert and oriented x 3.  Capillary Refill: Brisk, 3 seconds, normal   Peripheral Pulses: +2 palpable, equal bilaterally     Assessment/Plan:    Sepsis secondary to Multiple Abdominal Abscesses  CT A/P on 10/28  showed several abscess increased compared to 10/22.   She failed medical rx and abscess note amenable to IR drainage   Reviewed surgical note from 10/31, underwent drainage of abdominal abscess and colostomy creation  s/p secondary wound closure 11/6/2024.    Surgical culture 
    Date of Admission: 10/22/2024. Hospital Day: 2       HOSPITAL COURSE  53 y.o. female with pmh of stage IIa endometrioid ovarian cancer, recurrent pleural effusions who presents with abdominal pain.   Found to have multiple abd abscesses.   She was diagnosed with endometrioid ovarian cancer July 2023.  She is status post RATH, BSO.  On August 1/24 she was admitted to [facility] for respiratory failure, intra-abdominal abscesses without perforation, and bilateral hydropneumothorax with chest tube placement. She had drained placed for abdominal abscess.   She follows with [facility] and is receiving chemotherapy she has had 2 treatments, she received chemotherapy yesterday.   Gen sx, ID, hem onc consulted    Interval  History:   Had low gr fever 100.5 but pt doesn't feel febrile.  Has abd pain, nausea .  Asking if she can eat   Wbc spiked to 36 from  20      Physical Exam     /68   Pulse 100   Temp 99 °F (37.2 °C) (Oral)   Resp 16   Ht 1.448 m (4' 9\")   Wt 43.4 kg (95 lb 9.6 oz)   LMP 04/17/2013 (Approximate) Comment: No period in almost 4 years.  SpO2 97%   BMI 20.69 kg/m²     Vitals and nursing note reviewed.   Constitutional:       Appearance: She is ill-appearing.   HENT:      Head: Normocephalic.      Nose: Nose normal.      Mouth/Throat:      Mouth: Mucous membranes are dry.   Eyes:      Pupils: Pupils are equal, round, and reactive to light.   Cardiovascular:      Rate and Rhythm: Regular rhythm. Tachycardia present.      Pulses: Normal pulses.      Heart sounds: No murmur heard.  Pulmonary:      Effort: Pulmonary effort is normal. No respiratory distress.      Breath sounds: Normal breath sounds.   Abdominal:      General: Bowel sounds are normal. There is distension.      Tenderness: There is abdominal tenderness. There is no guarding.      Comments: Tenderness lower abdomen with distention    Musculoskeletal:         General: Normal range of motion.      Cervical back: Normal range of motion. 
    Date of Admission: 10/22/2024. Hospital Day: 20       HOSPITAL COURSE  53 y.o. female with PMH of stage IIa endometrioid ovarian cancer, recurrent pleural effusions who was admitted with sepsis secondary to multiple abdominal abscess .  After failed conservative treatment, underwent laparotomy with colostomy, abscess drainage on 11/1    Interval  History:   Patient seen and examined.   Denies any complaints. No pain, fever or chills.    Abdominal pain well-controlled.  Small liquid stool in ostomy bag.      Physical Exam     /75   Pulse 92   Temp 98.2 °F (36.8 °C) (Oral)   Resp 18   Ht 1.448 m (4' 9.01\")   Wt 39 kg (85 lb 15.7 oz)   LMP 04/17/2013 (Approximate) Comment: No period in almost 4 years.  SpO2 99%   BMI 18.60 kg/m²     General appearance: No apparent distress, appears stated age and cooperative.  ENT.  NG tube with suction  Respiratory:  Normal respiratory effort. Clear to auscultation, bilaterally without Rales/Wheezes/Rhonchi.  Cardiovascular: Regular rate and rhythm with normal S1/S2 without murmurs, rubs or gallops.  Abd: Surgical wound covered with bandage, dry, colostomy with very minimal output  Musculoskeletal: No clubbing, cyanosis or edema bilaterally.  Full range of motion without deformity.  Skin: Skin color, texture, turgor normal.  No rashes or lesions.  Neurologic:  Neurovascularly intact without any focal sensory/motor deficits. Cranial nerves: II-XII intact, grossly non-focal.  Psychiatric: Alert and oriented x 3.  Capillary Refill: Brisk, 3 seconds, normal   Peripheral Pulses: +2 palpable, equal bilaterally     Assessment/Plan:    Sepsis secondary to Multiple Abdominal Abscesses  CT A/P on 10/28  showed several abscess increased compared to 10/22.   She failed medical rx and abscess note amenable to IR drainage   Reviewed surgical note from 10/31, underwent drainage of abdominal abscess and colostomy creation  s/p secondary wound closure 11/6/2024.    Surgical culture 
    Date of Admission: 10/22/2024. Hospital Day: 8       HOSPITAL COURSE  53 y.o. female with PMH of stage IIa endometrioid ovarian cancer, recurrent pleural effusions who was admitted with sepsis secondary to multiple abdominal abscess     Interval  History:   Pain mild but persistent, remains npo for enema kub and possible flex sig of ex lap       Physical Exam     /68   Pulse 81   Temp 98.1 °F (36.7 °C) (Oral)   Resp 16   Ht 1.448 m (4' 9\")   Wt 43.4 kg (95 lb 9.6 oz)   LMP 04/17/2013 (Approximate) Comment: No period in almost 4 years.  SpO2 95%   BMI 20.69 kg/m²     General appearance: No apparent distress, appears stated age and cooperative.  Respiratory:  Normal respiratory effort. Clear to auscultation, bilaterally without Rales/Wheezes/Rhonchi.  Cardiovascular: Regular rate and rhythm with normal S1/S2 without murmurs, rubs or gallops.  Abd: mildly distended, less tender           Assessment/Plan:  Sepsis secondary to Multiple Abdominal Abscesses  CT abd pelvis 10  w con on 10/28  show several abscess increased compared to 10/22    Reviewed Gen sx note by dr salazar on 10/18 who rec  barium enema  KUB and  possible flex sig  today  May need surgical debridement eventually  this admission with failure of ab and not amenable to IR drainage   Continue IV Merrem and IV Diflucan    Enema imaging per Surgery  May need colonoscopy or ex lap given worsening CT Ab/P  CLD per Surgery    Stage IIA Endometrioid Ovarian Cancer S/P RATH, BSO   Chemo on hold till sepsis/abscess cleared up    Hx Recurrent Pleural Effusions   Likely malignant. No h/o  chf and clinically not in overload       Active Hospital Problems    Diagnosis     Intra-abdominal abscess (HCC) [K65.1]     On antineoplastic chemotherapy [Z79.899]     Port-A-Cath in place [Z95.828]     Elevated CA-125 [R97.1]     Carcinoembryonic antigen (CEA) elevation [R97.0]     Immunocompromised (HCC) [D84.9]     Sepsis (HCC) [A41.9]     Hepatitis B core 
    Date of Admission: 10/22/2024. Hospital Day: 9       HOSPITAL COURSE  53 y.o. female with PMH of stage IIa endometrioid ovarian cancer, recurrent pleural effusions who was admitted with sepsis secondary to multiple abdominal abscess     Interval  History:   Pain mild but persistent  . Wbc wnl 9. Na 133       Physical Exam     BP 99/66   Pulse 77   Temp 97.9 °F (36.6 °C) (Oral)   Resp 16   Ht 1.448 m (4' 9.01\")   Wt 43.4 kg (95 lb 9.6 oz)   LMP 04/17/2013 (Approximate) Comment: No period in almost 4 years.  SpO2 98%   BMI 20.68 kg/m²     General appearance: No apparent distress, appears stated age and cooperative.  Respiratory:  Normal respiratory effort. Clear to auscultation, bilaterally without Rales/Wheezes/Rhonchi.  Cardiovascular: Regular rate and rhythm with normal S1/S2 without murmurs, rubs or gallops.  Abd: mildly distended, less tender           Assessment/Plan:  Sepsis secondary to Multiple Abdominal Abscesses  CT abd pelvis 10  w con on 10/28  show several abscess increased compared to 10/22   Reviewed Gen sx note by dr salazar on 10/30  who rec ex lap and drainage of abscess as she failed medical rx and abscess note amenable to IR drainage   Xr kub  show contrast  extravasation around mid sigmoid.    Continue IV Merrem and IV Diflucan    Stage IIA Endometrioid Ovarian Cancer S/P RATH, BSO   Chemo on hold till sepsis/abscess cleared up    Hx Recurrent Pleural Effusions   Likely malignant. No h/o  chf and clinically not in overload       Active Hospital Problems    Diagnosis     Moderate malnutrition (HCC) [E44.0]     Intra-abdominal abscess (HCC) [K65.1]     On antineoplastic chemotherapy [Z79.899]     Port-A-Cath in place [Z95.828]     Elevated CA-125 [R97.1]     Carcinoembryonic antigen (CEA) elevation [R97.0]     Immunocompromised (HCC) [D84.9]     Septicemia (HCC) [A41.9]     Hepatitis B core antibody positive [R76.8]     Positive QuantiFERON-TB Gold test [R76.12]            DVT 
    Infectious Diseases   Progress Note      Admission Date: 10/22/2024  Hospital Day: Hospital Day: 10   Attending: Edwar Steele MD  Date of service: 10/31/2024     Chief complaint/ Reason for consult:     Sepsis with high-grade fever, worsening leukocytosis, tachycardia, elevated lactic acid level of 2.2  multiple intra-abdominal abscesses  Endometrial adenocarcinoma, status post robotic hand-assisted transabdominal hysterectomy and bilateral salpingo-oophorectomy on 7/22/2024, complicated by intra-abdominal abscesses and fistula formation  History of rectal ulcer and bleeding per rectum  History of recurrent idiopathic pleural effusions, status post pleurodesis in 2023  Elevated  4.17 on 7/18/2024  Elevated CEA of 8.8 on 6/17/2024    Microbiology:      I have reviewed allavailable micro lab data and cultures    Results       Procedure Component Value Units Date/Time    Culture, Surgical [[id number]] Collected: 10/31/24 1643    Order Status: Completed Specimen: Abscess Updated: 10/31/24 1957     Gram Stain Result No Epithelial Cells seen  2+ WBC's (Polymorphonuclear)  No organisms seen      Narrative:      ORDER#: [id number]                          ORDERED BY: EDWAR STEELE  SOURCE: Abscess Abdomen #2                 COLLECTED:  10/31/24 16:43  ANTIBIOTICS AT EMILY.:                      RECEIVED :  10/31/24 19:54    Culture, Surgical [[id number]] Collected: 10/31/24 1630    Order Status: Completed Specimen: Abscess Updated: 10/31/24 1955     Gram Stain Result No Epithelial Cells seen  4+ WBC's (Polymorphonuclear)  No organisms seen      Narrative:      ORDER#: [id number]                          ORDERED BY: EDWAR STEELE  SOURCE: Abscess Abdomen                    COLLECTED:  10/31/24 16:30  ANTIBIOTICS AT EMILY.:                      RECEIVED :  10/31/24 19:54    Blood Culture 2 [[id number]] Collected: 10/22/24 1723    Order Status: Completed Specimen: Blood Updated: 10/26/24 1815     Culture, Blood 2 No 
    Infectious Diseases   Progress Note      Admission Date: 10/22/2024  Hospital Day: Hospital Day: 14   Attending: Doroteo Steele MD  Date of service: 11/4/2024     Chief complaint/ Reason for consult:     Sepsis with high-grade fever, worsening leukocytosis, tachycardia, elevated lactic acid level of 2.2  multiple intra-abdominal abscesses  Endometrial adenocarcinoma, status post robotic hand-assisted transabdominal hysterectomy and bilateral salpingo-oophorectomy on 7/22/2024, complicated by intra-abdominal abscesses and fistula formation  History of rectal ulcer and bleeding per rectum  History of recurrent idiopathic pleural effusions, status post pleurodesis in 2023  Elevated  4.17 on 7/18/2024  Elevated CEA of 8.8 on 6/17/2024    Microbiology:      I have reviewed allavailable micro lab data and cultures    Results       Procedure Component Value Units Date/Time    Culture, Blood 1 [[id number]] Collected: 11/04/24    Order Status: No result     Culture, Blood 2 [[id number]] Collected: 11/04/24    Order Status: No result     Culture, Blood 1 [[id number]] Collected: 11/01/24 0000    Order Status: Canceled Specimen: Blood     Culture, Blood 2 [[id number]] Collected: 11/01/24 0000    Order Status: Canceled Specimen: Blood     Culture, Surgical [[id number]]  (Abnormal)  (Susceptibility) Collected: 10/31/24 1643    Order Status: Completed Specimen: Abscess Updated: 11/03/24 1248     Gram Stain Result No Epithelial Cells seen  2+ WBC's (Polymorphonuclear)  No organisms seen       Organism Escherichia coli     Culture Surgical Rare growth     Organism Enterococcus faecalis     Culture Surgical Light growth     Organism Lactobacillus species     Culture Surgical --     Moderate growth  No further workup       Organism Prevotella buccae     Anaerobic Culture --     Light growth  Beta lactamase negative. Sensitivities not routinely done.  Drugs of choice: Penicillin G, Clindamycin or Metronidazole.      
    Infectious Diseases   Progress Note      Admission Date: 10/22/2024  Hospital Day: Hospital Day: 17   Attending: Bessy Monroe MD  Date of service: 11/7/2024     Chief complaint/ Reason for consult:     Sepsis with high-grade fever, worsening leukocytosis, tachycardia, elevated lactic acid level of 2.2  multiple intra-abdominal abscesses  Endometrial adenocarcinoma, status post robotic hand-assisted transabdominal hysterectomy and bilateral salpingo-oophorectomy on 7/22/2024, complicated by intra-abdominal abscesses and fistula formation  History of rectal ulcer and bleeding per rectum  History of recurrent idiopathic pleural effusions, status post pleurodesis in 2023  Elevated  4.17 on 7/18/2024  Elevated CEA of 8.8 on 6/17/2024    Microbiology:      I have reviewed allavailable micro lab data and cultures    Results       Procedure Component Value Units Date/Time    Culture, Blood 1 [[id number]] Collected: 11/04/24 0751    Order Status: Completed Specimen: Blood Updated: 11/05/24 0815     Blood Culture, Routine No Growth to date.  Any change in status will be called.    Narrative:      ORDER#: [id number]                          ORDERED BY: ROGERIO MUÑIZ  SOURCE: Blood Antecubital-Rig              COLLECTED:  11/04/24 07:51  ANTIBIOTICS AT EMILY.:                      RECEIVED :  11/04/24 15:09  If child <=2 yrs old please draw pediatric bottle.~Blood Culture 1    Culture, Blood 2 [[id number]] Collected: 11/04/24 0750    Order Status: Completed Specimen: Blood Updated: 11/05/24 0815     Culture, Blood 2 No Growth to date.  Any change in status will be called.    Narrative:      ORDER#: [id number]                          ORDERED BY: ROGERIO MUÑIZ  SOURCE: Blood Hand, Right                  COLLECTED:  11/04/24 07:50  ANTIBIOTICS AT EMILY.:                      RECEIVED :  11/04/24 15:09  If child <=2 yrs old please draw pediatric bottle.~Blood Culture #2    Culture, Blood 1 [[id number]] Collected: 
    Infectious Diseases   Progress Note      Admission Date: 10/22/2024  Hospital Day: Hospital Day: 6   Attending: Syed Morrison MD  Date of service: 10/27/2024     Chief complaint/ Reason for consult:     Sepsis with high-grade fever, worsening leukocytosis, tachycardia, elevated lactic acid level of 2.2  multiple intra-abdominal abscesses  Endometrial adenocarcinoma, status post robotic hand-assisted transabdominal hysterectomy and bilateral salpingo-oophorectomy on 7/22/2024, complicated by intra-abdominal abscesses and fistula formation  History of rectal ulcer and bleeding per rectum  History of recurrent idiopathic pleural effusions, status post pleurodesis in 2023  Elevated  4.17 on 7/18/2024  Elevated CEA of 8.8 on 6/17/2024    Microbiology:      I have reviewed allavailable micro lab data and cultures    Results       Procedure Component Value Units Date/Time    Blood Culture 2 [[id number]] Collected: 10/22/24 1723    Order Status: Completed Specimen: Blood Updated: 10/26/24 1815     Culture, Blood 2 No Growth after 4 days of incubation.    Narrative:      ORDER#: [id number]                          ORDERED BY: JAIRON ALVAREZ  SOURCE: Blood port                         COLLECTED:  10/22/24 17:23  ANTIBIOTICS AT EMILY.:                      RECEIVED :  10/22/24 21:00  If child <=2 yrs old please draw pediatric bottle.~Blood Culture #2    Blood Culture 1 [[id number]] Collected: 10/22/24 1723    Order Status: Completed Specimen: Blood Updated: 10/26/24 1815     Blood Culture, Routine No Growth after 4 days of incubation.    Narrative:      ORDER#: [id number]                          ORDERED BY: JAIRON ALVAREZ  SOURCE: Blood RAXC                         COLLECTED:  10/22/24 17:23  ANTIBIOTICS AT EMILY.:                      RECEIVED :  10/22/24 21:00  If child <=2 yrs old please draw pediatric bottle.~Blood Culture 1             No results found for the last 90 days.         Antibiotics and immunizations: 
    Infectious Diseases   Progress Note      Admission Date: 10/22/2024  Hospital Day: Hospital Day: 8   Attending: Doroteo Steele MD  Date of service: 10/29/2024     Chief complaint/ Reason for consult:     Sepsis with high-grade fever, worsening leukocytosis, tachycardia, elevated lactic acid level of 2.2  multiple intra-abdominal abscesses  Endometrial adenocarcinoma, status post robotic hand-assisted transabdominal hysterectomy and bilateral salpingo-oophorectomy on 7/22/2024, complicated by intra-abdominal abscesses and fistula formation  History of rectal ulcer and bleeding per rectum  History of recurrent idiopathic pleural effusions, status post pleurodesis in 2023  Elevated  4.17 on 7/18/2024  Elevated CEA of 8.8 on 6/17/2024    Microbiology:      I have reviewed allavailable micro lab data and cultures    Results       Procedure Component Value Units Date/Time    Blood Culture 2 [[id number]] Collected: 10/22/24 1723    Order Status: Completed Specimen: Blood Updated: 10/26/24 1815     Culture, Blood 2 No Growth after 4 days of incubation.    Narrative:      ORDER#: [id number]                          ORDERED BY: JAIRON ALVAREZ  SOURCE: Blood port                         COLLECTED:  10/22/24 17:23  ANTIBIOTICS AT EMILY.:                      RECEIVED :  10/22/24 21:00  If child <=2 yrs old please draw pediatric bottle.~Blood Culture #2    Blood Culture 1 [[id number]] Collected: 10/22/24 1723    Order Status: Completed Specimen: Blood Updated: 10/26/24 1815     Blood Culture, Routine No Growth after 4 days of incubation.    Narrative:      ORDER#: [id number]                          ORDERED BY: JAIRON ALVAREZ  SOURCE: Blood RAXC                         COLLECTED:  10/22/24 17:23  ANTIBIOTICS AT EMILY.:                      RECEIVED :  10/22/24 21:00  If child <=2 yrs old please draw pediatric bottle.~Blood Culture 1             No results found for the last 90 days.         Antibiotics and immunizations: 
    Infectious Diseases   Progress Note      Admission Date: 10/22/2024  Hospital Day: Hospital Day: 9   Attending: Doroteo Steele MD  Date of service: 10/30/2024     Chief complaint/ Reason for consult:     Sepsis with high-grade fever, worsening leukocytosis, tachycardia, elevated lactic acid level of 2.2  multiple intra-abdominal abscesses  Endometrial adenocarcinoma, status post robotic hand-assisted transabdominal hysterectomy and bilateral salpingo-oophorectomy on 7/22/2024, complicated by intra-abdominal abscesses and fistula formation  History of rectal ulcer and bleeding per rectum  History of recurrent idiopathic pleural effusions, status post pleurodesis in 2023  Elevated  4.17 on 7/18/2024  Elevated CEA of 8.8 on 6/17/2024    Microbiology:      I have reviewed allavailable micro lab data and cultures    Results       Procedure Component Value Units Date/Time    Blood Culture 2 [[id number]] Collected: 10/22/24 1723    Order Status: Completed Specimen: Blood Updated: 10/26/24 1815     Culture, Blood 2 No Growth after 4 days of incubation.    Narrative:      ORDER#: [id number]                          ORDERED BY: JAIRON ALVAREZ  SOURCE: Blood port                         COLLECTED:  10/22/24 17:23  ANTIBIOTICS AT EMILY.:                      RECEIVED :  10/22/24 21:00  If child <=2 yrs old please draw pediatric bottle.~Blood Culture #2    Blood Culture 1 [[id number]] Collected: 10/22/24 1723    Order Status: Completed Specimen: Blood Updated: 10/26/24 1815     Blood Culture, Routine No Growth after 4 days of incubation.    Narrative:      ORDER#: [id number]                          ORDERED BY: JAIRON ALVAREZ  SOURCE: Blood RAXC                         COLLECTED:  10/22/24 17:23  ANTIBIOTICS AT EMILY.:                      RECEIVED :  10/22/24 21:00  If child <=2 yrs old please draw pediatric bottle.~Blood Culture 1             No results found for the last 90 days.         Antibiotics and immunizations: 
  [facility] - Inpatient Rehabilitation Department   Phone: [phone]    Occupational Therapy    [] Initial Evaluation            [x] Daily Treatment Note         [] Discharge Summary      Patient: Stephani Reyes   : 1970   MRN: [id number]   Date of Service:  2024    Admitting Diagnosis:  Sepsis (HCC)  Current Admission Summary:   53 y.o. female with PMH of stage IIa endometrioid ovarian cancer, recurrent pleural effusions who was admitted with sepsis secondary to multiple abdominal abscess     S/p Exploratory laparotomy, extensive lysis of adhesions, drainage of intra-abdominal abscesses, colon resection with sigmoid resection, closure of rectal stump, and end-colostomy on 10/31   Past Medical History:  has a past medical history of Chronic chest pain, Diverticulosis of colon, Essential hypertension, Hyperlipidemia, Ovarian cancer (HCC), Positive QuantiFERON-TB Gold test, and Uterine leiomyoma.  Past Surgical History:  has a past surgical history that includes Colonoscopy (2023); Lung surgery (2023); Cholecystectomy, laparoscopic (N/A, 2024); Umbilical hernia repair (N/A, 2024); Port Surgery (Left, 2024); Hysterectomy, total abdominal (2024); laparotomy (N/A, 10/31/2024); Small intestine surgery (N/A, 10/31/2024); Small intestine surgery (N/A, 10/31/2024); and Abdomen surgery (N/A, 2024).    Discharge Recommendations: Stephani Reyes scored a  on the AM-PAC ADL Inpatient form.  At this time, no further OT is recommended upon discharge due to anticipate patient to improve during hospital stay and return close to baseline/functional to return home.  Recommend patient returns to prior setting with prior services.       DME Required For Discharge: patient has all required DME for discharge    Precautions/Restrictions: high fall risk NPO with sips of water   Weight Bearing Restrictions: no restrictions  [] Right Upper Extremity  [] Left Upper 
  [facility] - Inpatient Rehabilitation Department   Phone: [phone]    Occupational Therapy    [] Initial Evaluation            [x] Daily Treatment Note         [] Discharge Summary      Patient: Stephani Reyes   : 1970   MRN: [id number]   Date of Service:  2024    Admitting Diagnosis:  Sepsis (HCC)  Current Admission Summary:   53 y.o. female with PMH of stage IIa endometrioid ovarian cancer, recurrent pleural effusions who was admitted with sepsis secondary to multiple abdominal abscess   S/p Exploratory laparotomy, extensive lysis of adhesions, drainage of intra-abdominal abscesses, colon resection with sigmoid resection, closure of rectal stump, and end-colostomy on 10/31/24  Past Medical History:  has a past medical history of Chronic chest pain, Diverticulosis of colon, Essential hypertension, Hyperlipidemia, Ovarian cancer (HCC), Positive QuantiFERON-TB Gold test, and Uterine leiomyoma.  Past Surgical History:  has a past surgical history that includes Colonoscopy (2023); Lung surgery (2023); Cholecystectomy, laparoscopic (N/A, 2024); Umbilical hernia repair (N/A, 2024); Port Surgery (Left, 2024); Hysterectomy, total abdominal (2024); laparotomy (N/A, 10/31/2024); Small intestine surgery (N/A, 10/31/2024); and Small intestine surgery (N/A, 10/31/2024).    Discharge Recommendations: Stephani Reyes scored a 20/24 on the AM-PAC ADL Inpatient form.  At this time, no further OT is recommended upon discharge due to anticipate patient to improve during hospital stay and return close to baseline/functional to return home.  Recommend patient returns to prior setting with prior services.       DME Required For Discharge: patient has all required DME for discharge    Precautions/Restrictions: low fall risk, NPO with sips of water   Weight Bearing Restrictions: no restrictions  [] Right Upper Extremity  [] Left Upper Extremity [] Right Lower 
  [facility] - Inpatient Rehabilitation Department   Phone: [phone]    Occupational Therapy    [x] Initial Evaluation            [] Daily Treatment Note         [] Discharge Summary      Patient: Stephani Reyes   : 1970   MRN: [id number]   Date of Service:  2024    Admitting Diagnosis:  Septicemia (HCC)  Current Admission Summary:   53 y.o. female with PMH of stage IIa endometrioid ovarian cancer, recurrent pleural effusions who was admitted with sepsis secondary to multiple abdominal abscess     S/p Exploratory laparotomy, extensive lysis of adhesions, drainage of intra-abdominal abscesses, colon resection with sigmoid resection, closure of rectal stump, and end-colostomy on 10/31   Past Medical History:  has a past medical history of Chronic chest pain, Diverticulosis of colon, Essential hypertension, Hyperlipidemia, Ovarian cancer (HCC), Positive QuantiFERON-TB Gold test, and Uterine leiomyoma.  Past Surgical History:  has a past surgical history that includes Colonoscopy (2023); Lung surgery (2023); Cholecystectomy, laparoscopic (N/A, 2024); Umbilical hernia repair (N/A, 2024); Port Surgery (Left, 2024); Hysterectomy, total abdominal (2024); laparotomy (N/A, 10/31/2024); Small intestine surgery (N/A, 10/31/2024); and Small intestine surgery (N/A, 10/31/2024).    Discharge Recommendations: Stephani Reyes scored a 18/24 on the AM-PAC ADL Inpatient form.  At this time, no further OT is recommended upon discharge due to anticipate patient to improve during hospital stay and return close to baseline/functional to return home.  Recommend patient returns to prior setting with prior services.       DME Required For Discharge: patient has all required DME for discharge    Precautions/Restrictions: high fall risk NPO with sips of water   Weight Bearing Restrictions: no restrictions  [] Right Upper Extremity  [] Left Upper Extremity [] Right Lower 
4 Eyes Skin Assessment     NAME:  Stephani Reyes  YOB: 1970  MEDICAL RECORD NUMBER:  [id number]    The patient is being assessed for  Admission    I agree that at least one RN has performed a thorough Head to Toe Skin Assessment on the patient. ALL assessment sites listed below have been assessed.      Areas assessed by both nurses:    Head, Face, Ears, Shoulders, Back, Chest, Arms, Elbows, Hands, Sacrum. Buttock, Coccyx, Ischium, Legs. Feet and Heels, and Under Medical Devices         Does the Patient have a Wound? No noted wound(s)       Tito Prevention initiated by RN: No  Wound Care Orders initiated by RN: No    Pressure Injury (Stage 3,4, Unstageable, DTI, NWPT, and Complex wounds) if present, place Wound referral order by RN under : No    New Ostomies, if present place, Ostomy referral order under : No     Nurse 1 eSignature: Electronically signed by Brittany Rudd RN on 10/23/24 at 4:42 AM EDT    **SHARE this note so that the co-signing nurse can place an eSignature**    Nurse 2 eSignature: Electronically signed by Monse Martínez RN on 10/23/24 at 4:50 AM EDT    
4 Eyes Skin Assessment     NAME:  Stephnai Reyes  YOB: 1970  MEDICAL RECORD NUMBER:  [id number]    The patient is being assess for  Post-Op Surgical    I agree that 2 RN's have performed a thorough Head to Toe Skin Assessment on the patient. ALL assessment sites listed below have been assessed.      Areas assessed by both nurses:    Head, Face, Ears, Shoulders, Back, Chest, Arms, Elbows, Hands, Sacrum. Buttock, Coccyx, Ischium, and Legs. Feet and Heels        Does the Patient have a Wound? Yes wound(s) were present on assessment. LDA wound assessment was Initiated and completed by OMAR Francis Prevention initiated:  Yes   Wound Care Orders initiated:  No    Pressure Injury (Stage 3,4, Unstageable, DTI, NWPT, and Complex wounds) if present place consult order under :: NA    New and Established Ostomies if present place consult order under : Yes      Nurse 1 eSignature: Electronically signed by Meredith Rankin RN on 11/1/24 at 3:54 AM EDT    **SHARE this note so that the co-signing nurse is able to place an eSignature**    Nurse 2 eSignature: Electronically signed by Hannah Antonio RN on 11/1/24 at 4:01 AM EDT         
ASSESSMENT: Completed, Patient had a low graded fever, some abdominal pain. Morphine 1 mL given and tolerated well!  All questions were answer, all need met. Will continue to monitor care.   
ASSESSMENT: Completed, VSS! Patient had some complaint of pain in the LLQ, Pain medication given, tolerated well! IV medication given tolerated well! All questions answered, all need met. Will continue to monitor.   
Anesthesia gave verbal order to treat BP  
Assessment complete. VSS. Patient resting in bed. Respirations even and easy. Call light in reach. No needs expressed at this time. Will continue to monitor.     
Assumed care for pt, pt already drinking about 20% of 2L go lytlely, will continue to encourage to finish   
CBC collected from left chest port and sent to lab. Unable to obtain enough blood for bmp, mag. Lab notified to collect.   
Consent signed and placed in patient chart.  
Nutrition Note    RECOMMENDATIONS  PO Diet: Advance diet as tolerated to low fiber  ONS: Ordered Ensure Plus HP TID    ASSESSMENT   Pt triggered for follow-up. NG tube for decompression removed 11/4 and clear liquid diet resumed 11/5 with Ensure Clear ordered TID which pt was accepting. S/p secondary wound closure yesterday and advanced to full liquid diet. RD will order ONS to offer additional protein to promote wound healing and will continue to monitor for diet advancement/tolerance with adequate po intake.     Malnutrition Status: Moderate malnutrition (as determined by initial RD assessment 10/29)  Chronic Illness  Findings of the 6 clinical characteristics of malnutrition:  Energy Intake:  75% or less estimated energy requirements for 1 month or longer  Weight Loss:  Mild weight loss (specify amount and time period) (2% x3mo; 7% x6 mo; 16% x12 mo.)     Body Fat Loss:  Mild body fat loss Triceps, Fat Overlying Ribs   Muscle Mass Loss:  Mild muscle mass loss Temples (temporalis), Clavicles (pectoralis & deltoids), Thigh (quadriceps), Calf (gastrocnemius), Scapula (trapezius)  Fluid Accumulation:  No significant fluid accumulation     Strength:  Normal  strength    NUTRITION DIAGNOSIS   Moderate malnutrition, in context of chronic illness related to catabolic illness as evidenced by criteria as identified in malnutrition assessment    Goals: PO intake 50% or greater, by next RD assessment, Other (diet advancement/tolerance)     NUTRITION RELATED FINDINGS  Objective: NS at 50 mL/hr. +10.7L per I/Os. Labs reviewed. Having colostomy output. No edema noted.  Wounds: Surgical Incision    CURRENT NUTRITION THERAPIES  ADULT DIET; Full Liquid       PO Intake: Unable to assess (no documented meal intakes since diet resumed)   PO Supplement Intake:None Ordered    ANTHROPOMETRICS  Current Height: 144.8 cm (4' 9.01\")  Current Weight - Scale: 39 kg (85 lb 15.7 oz)    Admission weight: 43.4 kg (95 lb 10.9 oz)  Ideal 
Nutrition Note    RECOMMENDATIONS  PO Diet: NPO  Nutrition Support: recommend parenteral nutrition if unable to advance PO diet today. Consult RD for recommendations.  Weight: obtain current weight      Pt is at significant risk for refeeding syndrome per AND/ASPEN guidelines based on meeting TWO of the following criteria:  [] BMI 16-18,5 kg/m2  [] Weight loss 5% in 1 month   None/negligible PO intake for:  [] 5-6 days  [x] less than 75% for greater than 7 days during acute illness  [x] Less than 75% for greater than 1 month  [x] Pre-feed labs are minimally low requiring minimal 1 dose supplementation  [] Moderate fat loss        ASSESSMENT   Pt is nutritionally compromised as evidenced by NPO x7 day.  Pt has been NPO since 10/29/24.  On 10/31/2024 pt has exp lap, CORA, drainage of intra-abdominal abscesses, colon resection with sigmoid resection, closure of rectal stump, & end-colostomy for perforated rectosigmoid colon, intraabdominal abscesses, intestinal adhesions.  Continues with NG suction and hypoactive BS.  Receiving NS at 75 mL/hr, labs & weight reviewed.  Ordered current wt, as the last wt recorded (on 10/22) was prior to surgery. Recommend replacing K+ and Mg, obtain phos level and begin parenteral nutrition.  Pt is high risk for refeeding sydrome.       Malnutrition Status: Moderate malnutrition  Chronic Illness  Findings of the 6 clinical characteristics of malnutrition:  Energy Intake:  75% or less estimated energy requirements for 1 month or longer  Weight Loss:  Mild weight loss (specify amount and time period) (2% x3mo; 7% x6 mo; 16% x12 mo.)     Body Fat Loss:  Mild body fat loss Triceps, Fat Overlying Ribs   Muscle Mass Loss:  Mild muscle mass loss Temples (temporalis), Clavicles (pectoralis & deltoids), Thigh (quadriceps), Calf (gastrocnemius), Scapula (trapezius)  Fluid Accumulation:  No significant fluid accumulation     Strength:  Normal  strength      NUTRITION DIAGNOSIS   Moderate 
Nutrition Note    RECOMMENDATIONS  PO Diet: NPO. When advanced recommend low fiber  ONS: when diet advanced recommend Ensure Plus High Protein (chocolate) TID      ASSESSMENT   Nutrition intervention triggered by LOS day 6.  Pt admitted with abdominal pain and nausea. Pt with multiple abdominal abscesses and endometrioid ovarian cancer undergoing chemotherapy.  Pt reports UBW of 120 - 125 lb last year and states she was down to 82 lb in July.  EHR weight reviewed and pt with a loss of 16% over past 12 months. CBW is 95 lb. Pt started taking oral nutrition supplements BID at home back in August.  Since admission pt's diet has been NPO/Clear liquid/low fiber and currently NPO.  Pt will drink Ensure Clear or Ensure Plus High Protein, stating I'll take whatever ONS is best for the diet I'm on.  When diet advanced, recommend low fiber with Ensure Plus High Protein TID.       Malnutrition Status: Moderate malnutrition  Chronic Illness  Findings of the 6 clinical characteristics of malnutrition:  Energy Intake:  75% or less estimated energy requirements for 1 month or longer  Weight Loss:  Mild weight loss (specify amount and time period) (2% x3mo; 7% x6 mo; 16% x12 mo.)     Body Fat Loss:  Mild body fat loss Triceps, Fat Overlying Ribs   Muscle Mass Loss:  Mild muscle mass loss Temples (temporalis), Clavicles (pectoralis & deltoids), Thigh (quadriceps), Calf (gastrocnemius), Scapula (trapezius)  Fluid Accumulation:  No significant fluid accumulation     Strength:  Normal  strength      NUTRITION DIAGNOSIS   Moderate malnutrition, In context of chronic illness related to catabolic illness, inadequate protein-energy intake as evidenced by Criteria as identified in malnutrition assessment    Goals: PO intake 50% or greater, by next RD assessment     NUTRITION RELATED FINDINGS  Objective: 10/28 LBM  Wounds: None    CURRENT NUTRITION THERAPIES  Diet NPO       PO Intake: NPO (variable when eating) 
Patient has a temp 102.6, gave 2 tablets acetaminophen 325 mg each, and RN perfect serve doctor.    
Patient remains NPO.  Belongings packed at bedside.  Family at bedside.  Vitals stable.  Consent in chart.  
Patient sleeping in bed with RR > 10. VSS. Dressing to abdomen CDI. Ostomy bag with very minimal bloody output- stoma remains red and moist. NG with minimal brown output. NAVARRO drain in place with dressing CDI. Family sent to room. Phase one criteria met, will transfer to   
Patient to PACU from OR, very drowsy. VSS- on 6L simple mask satting high 90s. NSR on monitor. Dressing to abdomen CDI, ice applied. NAVARRO drain in place with scant amount of bloody output. NGT at 53 cm and confirmed by Dr. Nolasco in OR. Stoma red and moist- ostomy bag with no output. Liriano draining yellow urine. Will continue to monitor   
Patient tolerated stanton removal well. Patient ambulated to restroom with a steady gait. Was able to void a small amount of urine.   
Pt VSS. O2 96% on room air. Pt states pain is at a tolerable level at this time and is ready to return to room. Surgical site to abdomen closed with staples and el dressing in place and green light visible. Pt seen by anesthesia. Phase I criteria met, transferring to .   
Pt arrived to PACU from OR, VSS, pt arouses to voice. Abd incision is CDI, ice applied. BRIGIDA dressing functioning properly. Ostomy site is CDI, beefy and red. Will continue to monitor.   
Pt refusing oral contrast d/t reaction she had during last procedure. States that after having diarrhea she ended up having a GI bleed and needing blood transfusion. States that she will take the IV contrast but will not take the oral contrast. Call placed to Gen Sx for further instructions. Will call CT to make them aware.  
Pt. Arrived from PACU at approx. 20:20.  Pt. Opens eyes to verbal stimuli and follows commands. Pt. Verbalizes name and date of birth. NGT noted to Left Nare at 53 cm. To low wall intermittent suction. Abdomen soft. Colostomy noted to LLQ. Stoma pink. Dressing to midline abdomin D/I. NAVARRO noted with moderate amount of sanguinous fluid noted. Pt. NPO except sips with meds at this time. Liriano to BSD noted. Draining Clear yellow urine. Pt. Arrived with left chest port accessed. Drsg. D/I.   Pt. On RA O2 sat 96%. Resp. Even/ nonlabored. Lungs diminished.  at bedside and updated on patient plan of care. Verbalized understanding. All needs met at this time.   
Shift assessment complete, A&Ox4, VSS, ambulating to bathroom, voiding. Administered all night meds, and safety precautions in place.  The care plan and education has been reviewed and mutually agreed upon with the patient.     
Shift assessment completed, vital sign obtained and ordered medications given. Patient appears to rest comfortably in the bed without signs of respiratory distress or discomfort. Patient oriented and denies additional nursing needs at this time. Call light and bedside table within reach; bed's wheels locked and in the lowest position.   
Shift assessment completed, vital sign obtained and ordered medications given. Patient c/o severe abdominal pain, prn pain medication given with therapeutic effect. Patient appears to rest comfortably in the bed without signs of respiratory distress or discomfort. Patient oriented and denies additional nursing needs at this time. Call light and bedside table within reach; bed's wheels locked and in the lowest position.   
Shift assessment completed, vital signs obtained and ordered medications given. Patient c/o pain and fever noted, prn morphine and tylenol given with therapeutic effect. Patient appears to rest comfortably in the bed and does not appear in respiratory distress or discomfort. Patient oriented and denies additional nursing needs at this time. Call light and bedside table within reach; bed's wheels locked and in the lowest position.   
Shift assessment completed. Routine vitals obtained and stable. Scheduled medications given. Patient is awake, alert and oriented. Independent in room. Respirations are easy and unlabored. Patient does not appear to be in distress, resting comfortably in bed at this time. Call light within reach.   
Shift assessment completed. Routine vitals obtained and stable. Scheduled medications given. Patient is awake, alert and oriented. Respirations are easy and unlabored. Patient does not appear to be in distress, resting comfortably in bed at this time. Call light within reach.   
Shift assessment completed. Routine vitals obtained. Scheduled medications given. Patient is awake, alert and oriented. Respirations are easy and unlabored. Patient does not appear to be in distress, resting comfortably at this time. Call light within reach.   
Shift assessment completed. Routine vitals obtained. Scheduled medications given. Patient is awake, alert and oriented. Respirations are easy and unlabored. Patient does not appear to be in distress, resting comfortably at this time. Call light within reach.   
Shift assessment completed. Routine vitals stable. Scheduled medications given. Patient is awake, alert and oriented. Respirations are easy and unlabored. Patient ambulated to bathroom, returned to bed safely, steady gait observed. Pt states pain increases with ambulation however declines intervention at this time. Abd dressing CDI. Minimal output noted from colostomy, stoma moist / red. Call light in reach. NGT remains in place. No needs expressed. IS encouraged.    
Shift assessment completed. Routine vitals stable. Scheduled medications given. Patient is awake, alert and oriented. Respirations are easy and unlabored. Patient does not appear to be in distress, resting comfortably at this time. Call light within reach.   
Shift assessment done, VSS, A/O. Neuro checks WNL.Denies pain at  this time. Meds given per MAR. Standard safety measures in place. The care plan and education has been reviewed and mutually agreed upon with the patient.     Pt potassium 3.3, has been replaced.     Pt is NPO for wound closer procedure. Pt is scheduled to go at 1600.          
Shift assessment. Patient in bed with family member alert and oriented. States that she's been going to the bathroom back and fort. Instructed to finish bowel prep for tomorrows surgery. Able to verbalized understanding. Denies pain, nausea and vomiting at this time. No further interventions needed. Plan of care ongoing.  
Shift assessment. VSS. Patient in bed alert and oriented. Denies pain, nausea and vomiting. Patient is aware that she cannot take anything by mouth. All questions answered at this time. Bed lower in position. Call light within reach. No further interventions needed at this time. Plan of care ongoing.  
Teaching / education initiated regarding perioperative experience, expectations, and pain management during stay. Patient verbalized understanding.   
Teaching / education initiated regarding perioperative experience, expectations, and pain management during stay. Patient verbalized understanding.    
   Intestinal adhesions [K66.0]     Perforated sigmoid colon (HCC) [K63.1]     Moderate malnutrition (HCC) [E44.0]     Intra-abdominal abscess (HCC) [K65.1]     On antineoplastic chemotherapy [Z79.899]     Port-A-Cath in place [Z95.828]     Elevated CA-125 [R97.1]     Carcinoembryonic antigen (CEA) elevation [R97.0]     Immunocompromised (HCC) [D84.9]     Septicemia (HCC) [A41.9]     Hepatitis B core antibody positive [R76.8]     Positive QuantiFERON-TB Gold test [R76.12]            DVT Prophylaxis:    Diet: Diet NPO Exceptions are: Sips of Water with Meds  Code Status: Full Code      Dispo - home  .  Expected DC  in/on   .4-5 d  Barrier to discharge           Chief Complaint:   Chief Complaint   Patient presents with    Abdominal Pain     States lower abd pain since last night, has ovarian cancer and is undergoing chemo. Denies diarrhea. Last chemo treatment was yesterday              Medications:  Reviewed    Infusion Medications    sodium chloride      dextrose      sodium chloride       Scheduled Medications    metoclopramide  5 mg IntraVENous Q6H    acetaminophen  1,000 mg Oral 3 times per day    diatrizoate meglumine-sodium  20 mL Oral Once    gabapentin  100 mg Oral TID    lactobacillus  1 capsule Oral BID WC    meropenem  1,000 mg IntraVENous Q8H    fluconazole  400 mg IntraVENous Q24H    sodium chloride flush  5-40 mL IntraVENous 2 times per day    [Held by provider] enoxaparin  30 mg SubCUTAneous Daily     PRN Meds: morphine **OR** morphine, [Held by provider] ketorolac, dextrose bolus **OR** dextrose bolus, glucagon (rDNA), dextrose, sodium phosphate 15 mmol in sodium chloride 0.9 % 250 mL IVPB, sodium chloride flush, sodium chloride, magnesium sulfate, ondansetron **OR** ondansetron, polyethylene glycol, morphine, melatonin      Intake/Output Summary (Last 24 hours) at 11/1/2024 1202  Last data filed at 11/1/2024 0549  Gross per 24 hour   Intake 2309.41 ml   Output 600 ml   Net 1709.41 ml 
10/28/2024. ?  Replaced.  Follow-up repeat iron panel.    LULU/hyperkalemia/metabolic acidosis: Resolved.  Creatinine bumped to 1.4, potassium 5.6  Secondary to surgery and poor intake from being n.p.o.    Hypokalemia: Replaced.  Monitor electrolytes and replace as needed.    Stage IIA Endometrioid Ovarian Cancer S/P RATH, BSO   Chemo on hold till sepsis/abscess cleared up    Hx Recurrent Pleural Effusions   Likely malignant. No h/o  chf and clinically not in overload       Active Hospital Problems    Diagnosis     Bandemia [D72.825]     Intestinal adhesions [K66.0]     Perforated sigmoid colon (HCC) [K63.1]     Moderate malnutrition (HCC) [E44.0]     Intra-abdominal abscess (HCC) [K65.1]     On antineoplastic chemotherapy [Z79.899]     Port-A-Cath in place [Z95.828]     Elevated CA-125 [R97.1]     Carcinoembryonic antigen (CEA) elevation [R97.0]     Immunocompromised (HCC) [D84.9]     Septicemia (HCC) [A41.9]     Hepatitis B core antibody positive [R76.8]     Positive QuantiFERON-TB Gold test [R76.12]            DVT Prophylaxis:  lovenox , currently on hold as per surgery  Diet: Diet NPO Exceptions are: Sips of Water with Meds  Code Status: Full Code      Dispo - home once medically stable.        Chief Complaint:   Chief Complaint   Patient presents with    Abdominal Pain     States lower abd pain since last night, has ovarian cancer and is undergoing chemo. Denies diarrhea. Last chemo treatment was yesterday              Medications:  Reviewed    Infusion Medications    sodium chloride 50 mL/hr at 11/05/24 1232    dextrose      sodium chloride       Scheduled Medications    fluconazole  200 mg IntraVENous Q24H    piperacillin-tazobactam  3,375 mg IntraVENous Q8H    diatrizoate meglumine-sodium  20 mL Oral Once    gabapentin  100 mg Oral TID    lactobacillus  1 capsule Oral BID WC    sodium chloride flush  5-40 mL IntraVENous 2 times per day    enoxaparin  30 mg SubCUTAneous Daily     PRN Meds: potassium chloride 
acidosis: Resolved.  Creatinine bumped to 1.4, potassium 5.6  Secondary to surgery and poor intake from being n.p.o.    Hypokalemia: Replaced.  Monitor electrolytes and replace as needed.    Stage IIA Endometrioid Ovarian Cancer S/P RATH, BSO   Chemo on hold till sepsis/abscess cleared up    Hx Recurrent Pleural Effusions   Likely malignant. No h/o  chf and clinically not in overload       Active Hospital Problems    Diagnosis     Open abdominal wall wound [S31.109A]     Bandemia [D72.825]     Intestinal adhesions [K66.0]     Perforated sigmoid colon (HCC) [K63.1]     Moderate malnutrition (HCC) [E44.0]     Intra-abdominal abscess (HCC) [K65.1]     On antineoplastic chemotherapy [Z79.899]     Port-A-Cath in place [Z95.828]     Elevated CA-125 [R97.1]     Carcinoembryonic antigen (CEA) elevation [R97.0]     Immunocompromised (HCC) [D84.9]     Sepsis (HCC) [A41.9]     Hepatitis B core antibody positive [R76.8]     Positive QuantiFERON-TB Gold test [R76.12]        DVT Prophylaxis:  lovenox , currently on hold as per surgery  Diet: ADULT ORAL NUTRITION SUPPLEMENT; Breakfast, Lunch, Dinner; Standard High Calorie/High Protein Oral Supplement  ADULT DIET; Regular  Code Status: Full Code      Dispo - home once medically stable.        Chief Complaint:   Chief Complaint   Patient presents with    Abdominal Pain     States lower abd pain since last night, has ovarian cancer and is undergoing chemo. Denies diarrhea. Last chemo treatment was yesterday        Medications:  Reviewed    Infusion Medications    dextrose      sodium chloride       Scheduled Medications    ferric gluconate (FERRLECIT) 125 mg in sodium chloride 0.9 % 100 mL IVPB  125 mg IntraVENous Q24H    fluconazole  200 mg IntraVENous Q24H    piperacillin-tazobactam  3,375 mg IntraVENous Q8H    diatrizoate meglumine-sodium  20 mL Oral Once    gabapentin  100 mg Oral TID    lactobacillus  1 capsule Oral BID WC    sodium chloride flush  5-40 mL IntraVENous 2 times 
Meds:   vancomycin  750 mg IntraVENous Q8H    lactobacillus  1 capsule Oral BID WC    meropenem  1,000 mg IntraVENous Q8H    fluconazole  400 mg IntraVENous Q24H    sodium chloride flush  5-40 mL IntraVENous 2 times per day    enoxaparin  30 mg SubCUTAneous Daily     Continuous Infusions:   dextrose      sodium chloride      sodium chloride 100 mL/hr at 10/25/24 0858     PRN Meds:.dextrose bolus **OR** dextrose bolus, glucagon (rDNA), dextrose, sodium phosphate 15 mmol in sodium chloride 0.9 % 250 mL IVPB, sodium chloride flush, sodium chloride, magnesium sulfate, ondansetron **OR** ondansetron, polyethylene glycol, acetaminophen **OR** acetaminophen, morphine, melatonin      Assessment:  53 y.o. female admitted with   1. Intra-abdominal abscess (HCC)    2. Septicemia (HCC)    3. Immunocompromised (HCC)        Intraabdominal abscess  Sepsis  Endometrioid right ovarian carcinoma  Prior total abdominal hysterectomy, BSO, with subsequent pelvic abscess and drainage   Recurrent pleural effusions      Plan:  1. Pain controlled, less distended with mild lower abdominal tenderness only on exam, no nausea or vomiting, feels hungry, having diarrhea, fevers trending down, only low-grade overnight up to 100.2, better this morning, leukocytosis remains high but trending down gradually; continued supportive care, check AXR  2. NPO--may started diet pending AXR results; monitor bowel function  3. IV hydration; monitor and correct electrolytes  4. Antibiotics per ID  5. Activity as tolerated  6. PRN analgesics and antiemetics--minimizing narcotics as tolerated  7. DVT prophylaxis with  Lovenox  8. Management of medical comorbid etiologies per primary team and consulting services    EDUCATION:  Educated patient on plan of care and disease process--all questions answered.    Plans discussed with patient and nursing.  Reviewed and discussed with Dr. Nolasco.      Signed:  Edith Gastelum, DEMIAN - CNP  10/25/2024 10:22 AM    Surgery 
last 72 hours.    Urinalysis:    No results found for: \"NITRU\", \"WBCUA\", \"BACTERIA\", \"RBCUA\", \"BLOODU\", \"SPECGRAV\", \"GLUCOSEU\"    Radiology:  FL WATER SOLUBLE ENEMA W OR WO KUB   Final Result   1. Contrast extravasation from the mid sigmoid colon representing a leak.   2. This may correspond to one of the peritoneal abscess collections seen on   recent prior CT as described above.   3. No additional area of extravasation is seen in the more proximal colon.         CT ABDOMEN PELVIS W IV CONTRAST Additional Contrast? None   Final Result   Several rim enhancing air-fluid collections are seen within the abdomen and   pelvis, increased as compared to prior examination dated 10/22/2024,   compatible with abscesses.      Moderate right and small left pleural effusions, increased as compared to   prior.         XR ABDOMEN (2 VIEWS)   Final Result   Nonspecific bowel gas pattern      Pleural-parenchymal disease at the lung bases.         XR CHEST PORTABLE   Final Result   Suspected small bibasilar pleural effusions.         CT ABDOMEN PELVIS W IV CONTRAST Additional Contrast? None   Final Result   Multiple abscesses which are known.  Negative for bowel obstruction.             IP CONSULT TO GENERAL SURGERY  IP CONSULT TO ONCOLOGY  IP CONSULT TO INFECTIOUS DISEASES      Doroteo Steele MD    
me    Recent Abx Admin                     meropenem (MERREM) 1,000 mg in sodium chloride 0.9 % 100 mL IVPB (mini-bag) (mg) 1,000 mg New Bag 11/01/24 2044    fluconazole (DIFLUCAN) in 0.9 % sodium chloride IVPB 400 mg (mg) 400 mg New Bag 11/01/24 1557    linezolid (ZYVOX) IVPB 600 mg (mg) 600 mg New Bag 11/01/24 1556    meropenem (MERREM) 1,000 mg in sodium chloride 0.9 % 100 mL IVPB (mini-bag) (mg) 1,000 mg New Bag 11/01/24 0846     1,000 mg New Bag  0051                      Immunization History: All immunization history was reviewed by me today.    Immunization History   Administered Date(s) Administered    COVID-19, PFIZER PURPLE top, DILUTE for use, (age 12 y+), 30mcg/0.3mL 04/23/2021, 05/14/2021, 12/23/2021    Hep A, HAVRIX, VAQTA, (age 19y+), IM, 1mL 12/21/2010    Hepatitis B 12/21/2010    Influenza Virus Vaccine 09/13/2013    Influenza, FLUARIX, FLULAVAL, FLUZONE (age 6 mo+) and AFLURIA, (age 3 y+), Quadv PF, 0.5mL 10/20/2019    Influenza, FLUCELVAX, (age 6 mo+), MDCK, Quadv PF, 0.5mL 10/10/2020    MMR, PRIORIX, M-M-R II, (age 12m+), SC, 0.5mL 12/21/2010    Poliovirus, IPOL, (age 6w+), SC/IM, 0.5mL 12/21/2010    TDaP, ADACEL (age 10y-64y), BOOSTRIX (age 10y+), IM, 0.5mL 12/21/2010       Known drug allergies:     All allergies were reviewed and updated    Allergies   Allergen Reactions    Crab Extract Itching    Shellfish Allergy Itching    Shrimp Extract Itching       Social history:     Social History:  All social andepidemiologic history was reviewed and updated by me today as needed.     Tobacco use:   reports that she has never smoked. She has never been exposed to tobacco smoke. She has never used smokeless tobacco.  Alcohol use:   reports that she does not currently use alcohol.  Currently lives in: [place] [address]   reports no history of drug use.     COVID VACCINATION AND LAB RESULT RECORDS:     Internal Administration   First Dose COVID-19, PFIZER PURPLE top, DILUTE for use, (age 12 y+), 
stand by assistance, contact guard assistance  Comments:  assists with LEs into bed; mild abdominal pain per facial expression; pt does not verbalize pain  Transfers:  Sit to stand transfer: stand by assistance, contact guard assistance  Stand to sit transfer: stand by assistance, contact guard assistance  Comments: from EOB  Ambulation:  Assistive Device: fields rail  Other Appliance: IV pole  Assistance: contact guard assistance  Distance: 125 ft  Gait Mechanics: slow apryl, but steady gait. Occasional use of fields rail for support; no LOB  Comments:    Stair Mobility:  Stair mobility not completed on this date.  Comments: declined stair ambulation  Wheelchair Mobility:  No w/c mobility completed on this date.  Comments:  Balance:  Static Sitting Balance: good: independent with functional balance in unsupported position  Dynamic Sitting Balance: good: independent with functional balance in unsupported position  Static Standing Balance: fair (+): maintains balance at SBA/supervision without use of UE support  Dynamic Standing Balance: fair (-): maintains balance at CGA with use of UE support  Comments: Pt stood at sink to brush teeth with SBA    Other Therapeutic Interventions  Pt declined exercise and more ambulation; pt wanting to return to bed; pt encouraged to sit in chair later today  Functional Outcomes  AM-PAC Inpatient Mobility Raw Score : 18              Cognition  WFL  Orientation:    alert and oriented x 4  Command Following:   WFL    Education  Barriers To Learning: none  Patient Education: patient educated on goals, PT role and benefits, plan of care, general safety, functional mobility training, transfer training, discharge recommendations  Learning Assessment:  patient verbalizes and demonstrates understanding    Assessment  Activity Tolerance: limited by weakness, endurance  Impairments Requiring Therapeutic Intervention: decreased functional mobility, decreased strength, decreased endurance, 
10/22/2024,   compatible with abscesses.      Moderate right and small left pleural effusions, increased as compared to   prior.         XR ABDOMEN (2 VIEWS)   Final Result   Nonspecific bowel gas pattern      Pleural-parenchymal disease at the lung bases.         XR CHEST PORTABLE   Final Result   Suspected small bibasilar pleural effusions.         CT ABDOMEN PELVIS W IV CONTRAST Additional Contrast? None   Final Result   Multiple abscesses which are known.  Negative for bowel obstruction.             IP CONSULT TO GENERAL SURGERY  IP CONSULT TO ONCOLOGY  IP CONSULT TO INFECTIOUS DISEASES  IP CONSULT TO SOCIAL WORK      Doroteo Steele MD    
HAVRIX, VAQTA, (age 19y+), IM, 1mL 12/21/2010    Hepatitis B 12/21/2010    Influenza Virus Vaccine 09/13/2013    Influenza, FLUARIX, FLULAVAL, FLUZONE (age 6 mo+) and AFLURIA, (age 3 y+), Quadv PF, 0.5mL 10/20/2019    Influenza, FLUCELVAX, (age 6 mo+), MDCK, Quadv PF, 0.5mL 10/10/2020    MMR, PRIORIX, M-M-R II, (age 12m+), SC, 0.5mL 12/21/2010    Poliovirus, IPOL, (age 6w+), SC/IM, 0.5mL 12/21/2010    TDaP, ADACEL (age 10y-64y), BOOSTRIX (age 10y+), IM, 0.5mL 12/21/2010       Known drug allergies:     All allergies were reviewed and updated    Allergies   Allergen Reactions    Crab Extract Itching    Shellfish Allergy Itching    Shrimp Extract Itching       Social history:     Social History:  All social andepidemiologic history was reviewed and updated by me today as needed.     Tobacco use:   reports that she has never smoked. She has never been exposed to tobacco smoke. She has never used smokeless tobacco.  Alcohol use:   reports that she does not currently use alcohol.  Currently lives in: [place] [address]   reports no history of drug use.     COVID VACCINATION AND LAB RESULT RECORDS:     Internal Administration   First Dose COVID-19, PFIZER PURPLE top, DILUTE for use, (age 12 y+), 30mcg/0.3mL  04/23/2021   Second Dose COVID-19, PFIZER PURPLE top, DILUTE for use, (age 12 y+), 30mcg/0.3mL   05/14/2021       Last COVID Lab No results found for: \"SARS-COV-2\"         Assessment:     The patient is a 53 y.o. old female who  has a past medical history of Chronic chest pain (01/24/2024), Diverticulosis of colon (05/05/2023), Essential hypertension (02/25/2021), Hyperlipidemia, Ovarian cancer (HCC) (06/17/2024), Positive QuantiFERON-TB Gold test (09/13/2023), and Uterine leiomyoma (04/27/2022). with following problems:    Sepsis with high-grade fever, worsening leukocytosis, tachycardia, elevated lactic acid level of 2.2--resolved  multiple intra-abdominal abscesses-the patient is on IV Zosyn and fluconazole, 
Hepatitis B core antibody positive    Positive QuantiFERON-TB Gold test    Prediabetes    Symptomatic cholelithiasis    Umbilical hernia    Intraabdominal mass    Ovarian cancer (HCC)    Pelvic mass    Endometrial cancer (HCC)    Septicemia (HCC)    Intra-abdominal abscess (HCC)    On antineoplastic chemotherapy    Port-A-Cath in place    Elevated CA-125    Carcinoembryonic antigen (CEA) elevation    Immunocompromised (HCC)    Moderate malnutrition (HCC)    Intestinal adhesions    Perforated sigmoid colon (HCC)       ASSESSMENT AND PLAN:    53-year-old lady has    1.  Endometrioid right ovarian carcinoma:    -FIGO stage II  -As described above  -C2 adjuvant chemotherapy with carboplatin, Taxol and pegfilgrastim were done on 10/21/2024 and 10/22/2024.    2.  Intra-abdominal abscess:    -Patient admitted on 10/22/2024 with abdominal pain  -CT abdomen and pelvis has shown multiple intra-abdominal abscesses  -She is on broad-spectrum antibiotics.  -ID is following.    -Leukocytosis is most likely due to pegfilgrastim.  Infection also might be contributing.    Patient had exploratory laparotomy, colon resection, drainage of intra-abdominal abscesses and colostomy done on 10/31/2024.      3.  Peripheral neuropathy:    -Due to chemotherapy  -started gabapentin 100 mg p.o. 3 times daily    4. Anemia: Due to chemotherapy, infection    -Had iron studies are consistent with anemia of chronic disease and iron deficiency  -Transfuse PRBCs if hemoglobin is less than 7  B12 and folate -no deficiency          ONCOLOGIC DISPOSITION:      Shailesh Thacker MD  Please contact through [employer]  
benefits, plan of care, general safety, functional mobility training, transfer training, discharge recommendations  Learning Assessment:  patient verbalizes and demonstrates understanding    Assessment  Activity Tolerance: limited by weakness, endurance, multiple lines  Impairments Requiring Therapeutic Intervention: decreased functional mobility, decreased strength, decreased endurance, decreased balance, increased pain, decreased posture  Prognosis: good  Clinical Assessment: Pt with recent abdominal surgery and multiple lines, history of CA. Pt was independent with mobility and is currently  HHA/CGA following surgery yesterday. Anticipate pt will recover to home with no further PT needs. Pt would benefit from skilled PT services while here in the hospital.   Safety Interventions: patient left in chair, chair alarm in place, call light within reach, gait belt, and nurse notified    Plan  Frequency: 3-5 x/per week  Current Treatment Recommendations: strengthening, balance training, functional mobility training, transfer training, gait training, stair training, endurance training, pain management, and home exercise program    Goals  Patient Goals: To DC home   Short Term Goals:  Time Frame: To be met by  DC  Patient will complete bed mobility at [facility] independent   Patient will complete transfers at supervision   Patient will ambulate 300 ft with use of no device at Independent  Patient will ascend/descend 2 stairs with (B) handrail at supervision    Above goals reviewed on 11/1/2024.  All goals are ongoing at this time unless indicated above.      Therapy Session Time      Individual Group Co-treatment   Time In     1022   Time Out     1101   Minutes     39     Timed Code Treatment Minutes:  24 Minutes  Total Treatment Minutes:  39 minutes       Electronically Signed By: ALE WILSON, [id number]  
glands are within normal limits.  No hydronephrosis or perinephric stranding. Abdominal aorta is within normal limits in caliber.  Shotty retroperitoneal lymph nodes. GI/Bowel: Bowel evaluation is limited without contrast.  Loops of small and large bowel are nondilated. Pelvis: Bladder is grossly unremarkable.  2.1 x 1.0 cm cyst is again demonstrated posterior to the right parasymphyseal pubic bone.  No inguinal adenopathy. Peritoneum/Retroperitoneum: Extraluminal air-fluid collections as above. Bones/Soft Tissues: Degenerative change of the spine.     Several rim enhancing air-fluid collections are seen within the abdomen and pelvis, increased as compared to prior examination dated 10/22/2024, compatible with abscesses. Moderate right and small left pleural effusions, increased as compared to prior.      Scheduled Meds:   iohexol        diatrizoate meglumine-sodium  20 mL Oral Once    gabapentin  100 mg Oral TID    lactobacillus  1 capsule Oral BID WC    meropenem  1,000 mg IntraVENous Q8H    fluconazole  400 mg IntraVENous Q24H    sodium chloride flush  5-40 mL IntraVENous 2 times per day    enoxaparin  30 mg SubCUTAneous Daily     Continuous Infusions:   dextrose      sodium chloride       PRN Meds:.iohexol, dextrose bolus **OR** dextrose bolus, glucagon (rDNA), dextrose, sodium phosphate 15 mmol in sodium chloride 0.9 % 250 mL IVPB, sodium chloride flush, sodium chloride, magnesium sulfate, ondansetron **OR** ondansetron, polyethylene glycol, acetaminophen **OR** acetaminophen, morphine, melatonin      Assessment:  53 y.o. female admitted with   1. Intra-abdominal abscess (HCC)    2. Septicemia (HCC)    3. Immunocompromised (HCC)        Intraabdominal abscess  Sepsis  Endometrioid right ovarian carcinoma  Prior total abdominal hysterectomy, BSO, with subsequent pelvic abscess and drainage   Recurrent pleural effusions      Plan:  1. Still with intermittent cramping pain, but controlled, non-distended with mild 
worsening diarrhea, break through fever etc.  Discussed patient's condition and what to expect. All of the patient's questions were addressed in a satisfactory manner and patient verbalized understanding all instructions.      Level of complexity of visit and medical decision making: High     Risk of Complications/Morbidity: High     Illness(es)/ Infection present that pose threat to life/bodily function.   There is potential for severe exacerbation of infection/side effects of treatment.  Therapy requires intensive monitoring for antimicrobial agent toxicity.     TIME SPENT TODAY:     - I did a detailed face-to-face evaluation of the patient including interval history and review of systems from patient/available family members/RN/patient care team and did a thorough bedside examination of the patient today.  I spent over  51 minutes today on this complex inpatient encounter (including interval history, physical exam, detailed review of data including labs, cultures, imaging studies and independent interpretation of those, development, documentation and implementation of a thorough treatment plan, discussions with patient/family/ nursing staff/providers, reviewing the past medical records, case management discussions and  coordination of complex care). More than 50 percent of this time includes the floor/unit time spent on counseling and coordination of care.      Thank you for involving me in the care of your patient. I will continue to follow. If you have anyadditional questions, please do not hesitate to contact me.        Subjective:     Interval history: Interval history was obtained from chart review and patient/ RN.  The patient was seen and examined at bedside today.  She is afebrile.  Has been tolerating the antibiotics okay.  No diarrhea     REVIEW OF SYSTEMS:      Review of Systems   Constitutional:  Positive for fatigue. Negative for chills, diaphoresis and fever.   HENT:  Negative for ear discharge, ear 
not jaundiced.      Findings: No bruising, erythema or rash.   Neurological:      General: No focal deficit present.      Mental Status: She is alert and oriented to person, place, and time. Mental status is at baseline.      Motor: No abnormal muscle tone.   Psychiatric:         Mood and Affect: Mood normal.         Behavior: Behavior normal.            Lines and drains: All vascular access sites are healthy with no local erythema, discharge or tenderness.      Intake and output:    I/O last 3 completed shifts:  In: 10 [I.V.:10]  Out: -     Lab Data:   All available labs and old records have been reviewed by me.    CBC:  Recent Labs     10/22/24  1723 10/23/24  0536 10/24/24  0520   WBC 20.7* 36.4* 27.6*   RBC 4.49 3.99* 3.13*   HGB 12.6 11.2* 8.8*   HCT 37.9 34.0* 26.4*    168 132*   MCV 84.5 85.1 84.5   MCH 28.1 28.1 28.0   MCHC 33.3 33.0 33.1   RDW 16.0* 16.4* 16.0*        BMP:  Recent Labs     10/22/24  1723 10/23/24  0536 10/24/24  0520   * 132* 135*   K 4.2 4.1 3.3*    101 105   CO2 22 21 21   BUN 21* 20 11   CREATININE 0.6 0.7 <0.5*   CALCIUM 8.7 8.2* 7.4*   GLUCOSE 164* 137* 102*        Hepatic Function Panel:   Lab Results   Component Value Date/Time    ALKPHOS 60 10/24/2024 05:20 AM    ALT 8 10/24/2024 05:20 AM    AST 12 10/24/2024 05:20 AM    BILITOT 0.4 10/24/2024 05:20 AM       CPK: No results found for: \"CKTOTAL\"  ESR:   Lab Results   Component Value Date    SEDRATE 27 10/23/2024     CRP:   Lab Results   Component Value Date    .0 (H) 10/23/2024           Imaging:    All pertinent images and reports for the current visit were reviewed by me during this visit.  I reviewed the chest x-ray/CT scan/MRI images and independently interpreted the findings and results today.    XR CHEST PORTABLE   Final Result   Suspected small bibasilar pleural effusions.         CT ABDOMEN PELVIS W IV CONTRAST Additional Contrast? None   Final Result   Multiple abscesses which are known.  Negative 
[place] [address]  [facility] days:  Tuesday & Thursday AM    [facility]  [address] , [address] (Medical office Building)  [place], [place], [address]  [facility] days: Wednesday AM             
  Final Result   Multiple abscesses which are known.  Negative for bowel obstruction.             Medications: All current and past medications were reviewed.     fluconazole  200 mg IntraVENous Q24H    piperacillin-tazobactam  3,375 mg IntraVENous Q8H    diatrizoate meglumine-sodium  20 mL Oral Once    gabapentin  100 mg Oral TID    lactobacillus  1 capsule Oral BID WC    sodium chloride flush  5-40 mL IntraVENous 2 times per day    enoxaparin  30 mg SubCUTAneous Daily        sodium chloride 50 mL/hr at 11/05/24 1232    dextrose      sodium chloride         potassium chloride **OR** potassium alternative oral replacement **OR** potassium chloride, morphine **OR** morphine, dextrose bolus **OR** dextrose bolus, glucagon (rDNA), dextrose, sodium phosphate 15 mmol in sodium chloride 0.9 % 250 mL IVPB, sodium chloride flush, sodium chloride, magnesium sulfate, ondansetron **OR** ondansetron, polyethylene glycol, morphine, melatonin      Problem list:       Patient Active Problem List   Diagnosis Code    Calculus of gallbladder without cholecystitis without obstruction K80.20    History of hepatitis B Z86.19    Mixed hyperlipidemia E78.2    Hepatitis B core antibody positive R76.8    Positive QuantiFERON-TB Gold test R76.12    Prediabetes R73.03    Symptomatic cholelithiasis K80.20    Umbilical hernia K42.9    Intraabdominal mass R19.00    Ovarian cancer (HCC) C56.9    Pelvic mass R19.00    Endometrial cancer (HCC) C54.1    Septicemia (HCC) A41.9    Intra-abdominal abscess (HCC) K65.1    On antineoplastic chemotherapy Z79.899    Port-A-Cath in place Z95.828    Elevated CA-125 R97.1    Carcinoembryonic antigen (CEA) elevation R97.0    Immunocompromised (HCC) D84.9    Moderate malnutrition (HCC) E44.0    Intestinal adhesions K66.0    Perforated sigmoid colon (HCC) K63.1    Bandemia D72.825       Please note that this chart was generated using Dragon dictation software. Although every effort was made to ensure the accuracy 
positive R76.8    Positive QuantiFERON-TB Gold test R76.12    Prediabetes R73.03    Symptomatic cholelithiasis K80.20    Umbilical hernia K42.9    Intraabdominal mass R19.00    Ovarian cancer (HCC) C56.9    Pelvic mass R19.00    Endometrial cancer (HCC) C54.1    Sepsis (HCC) A41.9    Intra-abdominal abscess (HCC) K65.1    On antineoplastic chemotherapy Z79.899    Port-A-Cath in place Z95.828    Elevated CA-125 R97.1    Carcinoembryonic antigen (CEA) elevation R97.0    Immunocompromised (HCC) D84.9    Moderate malnutrition (HCC) E44.0    Intestinal adhesions K66.0    Perforated sigmoid colon (HCC) K63.1    Bandemia D72.825       Please note that this chart was generated using Dragon dictation software. Although every effort was made to ensure the accuracy of this automated transcription, some errors in transcription may have occurred inadvertently. If you may need any clarification, please do not hesitate to contact me through [employer] or at the phone number provided below with my electronic signature.  Any pictures or media included in this note were obtained after taking informed verbal consent from the patient and with their approval to include those in the patient's medical record.        Warren Potter MD, MPH, FACP, [facility]  11/5/2024, 3:20 PM  Central Office Phone: [phone]  Central Office Fax: [phone]    [facility]   [address], [address] (Medical [address] [place] [address]  [facility] days:  Tuesday & Thursday AM    [facility]  [address] , [address] (Medical office Building)  [place], [address]  [facility] days: Wednesday AM

## 2024-11-10 NOTE — DISCHARGE SUMMARY
AM     Lab Results   Component Value Date/Time    BLOODCULT2 No Growth after 4 days of incubation. 11/04/2024 07:50 AM     Organism:   Lab Results   Component Value Date/Time    ORG Escherichia coli 10/31/2024 04:43 PM    ORG Enterococcus faecalis 10/31/2024 04:43 PM    ORG Lactobacillus species 10/31/2024 04:43 PM    ORG Prevotella buccae 10/31/2024 04:43 PM       Time Spent Discharging patient 35 minutes    Electronically signed by Bessy Monroe MD on 11/10/2024 at 11:29 AM

## 2024-11-10 NOTE — PLAN OF CARE
Problem: Pain  Goal: Verbalizes/displays adequate comfort level or baseline comfort level  11/10/2024 0743 by Marlene Pena RN  Outcome: Progressing  11/10/2024 0108 by Giovana Boroks RN  Outcome: Progressing     Problem: Safety - Adult  Goal: Free from fall injury  11/10/2024 0743 by Marlene Pena RN  Outcome: Progressing  11/10/2024 0108 by Giovana Brooks RN  Outcome: Progressing     Problem: Cardiovascular - Adult  Goal: Absence of cardiac dysrhythmias or at baseline  11/10/2024 0743 by Marlene Pena RN  Outcome: Progressing  11/10/2024 0108 by Giovana Brooks RN  Outcome: Progressing     Problem: Gastrointestinal - Adult  Goal: Minimal or absence of nausea and vomiting  11/10/2024 0743 by Marlene Pena RN  Outcome: Progressing  11/10/2024 0108 by Giovana Brooks RN  Outcome: Progressing  Goal: Maintains adequate nutritional intake  11/10/2024 0743 by Marlene Pena RN  Outcome: Progressing  11/10/2024 0108 by Giovana Brooks RN  Outcome: Progressing     Problem: Nutrition Deficit:  Goal: Optimize nutritional status  11/10/2024 0743 by Marlene Pena RN  Outcome: Progressing  11/10/2024 0108 by Giovana Brooks RN  Outcome: Progressing     Problem: Discharge Planning  Goal: Discharge to home or other facility with appropriate resources  11/10/2024 0743 by Marlene Pena RN  Outcome: Progressing  11/10/2024 0108 by Giovana Brooks RN  Outcome: Progressing  Flowsheets (Taken 11/9/2024 1515 by Marlene Pena RN)  Discharge to home or other facility with appropriate resources: Identify barriers to discharge with patient and caregiver

## 2024-11-10 NOTE — PLAN OF CARE
Problem: Pain  Goal: Verbalizes/displays adequate comfort level or baseline comfort level  11/10/2024 0743 by Marlene Pena RN  Outcome: Completed  11/10/2024 0743 by Marlene Pena RN  Outcome: Progressing  11/10/2024 0108 by Giovana Brooks RN  Outcome: Progressing     Problem: Safety - Adult  Goal: Free from fall injury  11/10/2024 0743 by Marlene Pena RN  Outcome: Completed  11/10/2024 0743 by Marlene Pena RN  Outcome: Progressing  11/10/2024 0108 by Giovana Brooks, RN  Outcome: Progressing     Problem: Cardiovascular - Adult  Goal: Absence of cardiac dysrhythmias or at baseline  11/10/2024 0743 by Marlene Pena RN  Outcome: Completed  11/10/2024 0743 by Marlene Pena RN  Outcome: Progressing  11/10/2024 0108 by Giovana Brooks, RN  Outcome: Progressing     Problem: Gastrointestinal - Adult  Goal: Minimal or absence of nausea and vomiting  11/10/2024 0743 by Marlene Pena RN  Outcome: Completed  11/10/2024 0743 by Marlene Pena RN  Outcome: Progressing  11/10/2024 0108 by Giovana Brooks, RN  Outcome: Progressing  Goal: Maintains adequate nutritional intake  11/10/2024 0743 by Marlene Pena RN  Outcome: Completed  11/10/2024 0743 by Marlene Pena RN  Outcome: Progressing  11/10/2024 0108 by Giovana Brooks, RN  Outcome: Progressing     Problem: Nutrition Deficit:  Goal: Optimize nutritional status  11/10/2024 0743 by Marlene Pena RN  Outcome: Completed  11/10/2024 0743 by Marlene Pena RN  Outcome: Progressing  11/10/2024 0108 by Giovana Brooks, RN  Outcome: Progressing     Problem: Discharge Planning  Goal: Discharge to home or other facility with appropriate resources  11/10/2024 0743 by Marlene Pena RN  Outcome: Completed  11/10/2024 0743 by Marlene Pena RN  Outcome: Progressing  11/10/2024 0108 by Giovana Brooks RN  Outcome: Progressing  Flowsheets (Taken 11/9/2024

## 2024-11-11 ENCOUNTER — TELEPHONE (OUTPATIENT)
Dept: INFECTIOUS DISEASES | Age: 54
End: 2024-11-11

## 2024-11-11 ENCOUNTER — TELEPHONE (OUTPATIENT)
Dept: PRIMARY CARE CLINIC | Age: 54
End: 2024-11-11

## 2024-11-11 ENCOUNTER — CLINICAL DOCUMENTATION (OUTPATIENT)
Dept: INFECTIOUS DISEASES | Age: 54
End: 2024-11-11

## 2024-11-11 DIAGNOSIS — K65.1 INTRA-ABDOMINAL ABSCESS (HCC): Primary | ICD-10-CM

## 2024-11-11 RX ORDER — FLUCONAZOLE 2 MG/ML
200 INJECTION, SOLUTION INTRAVENOUS EVERY 24 HOURS
Qty: 1100 ML | Refills: 0
Start: 2024-11-11 | End: 2024-11-22

## 2024-11-11 RX ORDER — SODIUM CHLORIDE 0.9 % (FLUSH) 0.9 %
5-40 SYRINGE (ML) INJECTION PRN
Status: CANCELLED | OUTPATIENT
Start: 2024-11-11

## 2024-11-11 NOTE — TELEPHONE ENCOUNTER
Care Transitions Initial Follow Up Call    Outreach made within 2 business days of discharge: Yes    Patient: Stephani Reyes Patient : 1970   MRN: [id number]  Reason for Admission: Abd Abscess   Discharge Date: 11/10/24       Spoke with: Stephani     Discharge department/facility: [facility] Interactive Patient Contact:  Was patient able to fill all prescriptions: Yes  Was patient instructed to bring all medications to the follow-up visit: Yes  Is patient taking all medications as directed in the discharge summary? Yes  Does patient understand their discharge instructions: Yes  Does patient have questions or concerns that need addressed prior to 7-14 day follow up office visit: no    Additional needs identified to be addressed with provider               Scheduled appointment with PCP within 7-14 days    Follow Up  Future Appointments   Date Time Provider Department Center   2024 10:30 AM ROOM [facility] ONC [facility] ONC [facility]   2024  1:30 PM Reyes, Eleia J, MD TC [facility]   2024 10:30 AM ROOM [facility] ONC [facility] ONC [facility]   2024 12:00 PM Warren Potter MD FF INFCT DIS [facility]       Patsy Cordero MA

## 2024-11-11 NOTE — CARE COORDINATION
Ostomy Follow-up Progress Note      NAME:  Stephani Reyes  MEDICAL RECORD NUMBER:  [id number]  AGE: 53 y.o.   GENDER:  female  :  1970  TODAY'S DATE:  2024    Subjective:    Stephani Reyes is a 53 y.o. female referred by:   Provider and Nursing    New    Objective    /77   Pulse 89   Temp 99.3 °F (37.4 °C) (Oral)   Resp 16   Ht 1.448 m (4' 9.01\")   Wt 43.4 kg (95 lb 9.6 oz)   LMP 2013 (Approximate) Comment: No period in almost 4 years.  SpO2 98%   BMI 20.68 kg/m²     Tito Risk Score Tito Scale Score: 21    Assessment   Surgeon Gaurav          Colostomy LLQ (Active)   Stomal Appliance 2 piece;Flat;Changed 24 1200   Flange Size (inches) 2.25 Inches 24 1200   Stoma  Assessment Pink;Protrudes;Moist 24 1200   Peristomal Assessment Clean, dry & intact 24 1200   Mucocutaneous Junction Intact 24 1200   Treatment Pouch change;Site care 24 1200   Stool Appearance Other (Comment) 24 1200   Stool Color Tan (Comment);Red 24 0915   Output (mL) 0 ml 24 1714   Number of days: 3   Stoma is pink and protrudes.  Os in the middle.  Pouch changed.             Intake/Output Summary (Last 24 hours) at 2024 1332  Last data filed at 2024 1119  Gross per 24 hour   Intake 70 ml   Output 380 ml   Net -310 ml       Plan:   Plan for Ostomy Care:        Routine ostomy care -   Change pouch every 3 days & PRN. Empty when 1/3 to 1/2 full of stool or gas.      Supplies:  Coloplast wafer 96085, pouch 07210, barrier ring (optional) 39190     Directions:   -Empty pouch, then remove , cleanse skin with warm water, dry thoroughly   -Measure stoma & cut wafer to fit snuggly around base of stoma   -Place wafer over stoma, press to skin  -remove adhesive ring paper cover, line up pouch inside of blue line on wafer. -Press wafer and pouch together for a good seal  -close end of pouch.  -place a warm blanket or hold your hand over pouch for 5 
    Ostomy Referral Progress Note      NAME:  Stephani Reyes  MEDICAL RECORD NUMBER:  [id number]  AGE: 53 y.o.   GENDER:  female  :  1970  TODAY'S DATE:  2024    Subjective     Stephani Reyes is a 53 y.o. female referred by:   Provider and Nursing    New and Established    Surgeon Gaurav     PAST MEDICAL HISTORY:        Diagnosis Date    Chronic chest pain 2024    Due to chronic pleural disease.  Was referred to pain clinic.    Diverticulosis of colon 2023    Essential hypertension 2021    Hyperlipidemia     Ovarian cancer (HCC) 2024    Endometrioid ovarian cancer stage IIa    Positive QuantiFERON-TB Gold test 2023    Uterine leiomyoma 2022       MEDICATIONS:    No current facility-administered medications on file prior to encounter.     Current Outpatient Medications on File Prior to Encounter   Medication Sig Dispense Refill    ferrous sulfate (IRON 325) 325 (65 Fe) MG tablet Take 1 tablet by mouth in the morning and 1 tablet in the evening.      acetaminophen (TYLENOL) 325 MG tablet Take 2 tablets by mouth every 6 hours as needed for Pain (Patient not taking: Reported on 10/23/2024)      diphenhydrAMINE (BENADRYL) 25 MG capsule Take 1 capsule by mouth nightly as needed for Itching (Patient not taking: Reported on 10/23/2024)      Vitamin D (CHOLECALCIFEROL) 25 MCG (1000 UT) TABS tablet Take 1 tablet by mouth daily (Patient not taking: Reported on 10/23/2024)      COLLAGEN PO Take 1 tablet by mouth Daily (Patient not taking: Reported on 10/23/2024)         ALLERGIES:    Allergies   Allergen Reactions    Crab Extract Itching    Shellfish Allergy Itching    Shrimp Extract Itching       PAST SURGICAL HISTORY:    Past Surgical History:   Procedure Laterality Date    CHOLECYSTECTOMY, LAPAROSCOPIC N/A 2024    LAPAROSCOPIC CHOLECYSTECTOMY WITH CHOLANGIOGRAM performed by Iam Nolasco MD at [facility] OR    COLONOSCOPY  2023    With polypectomy 
Bruce in financial services updated demographics to show Amb[employer] insurance which she states is pt's primary insurance now but was unable to remove from system the UMR that is inactive.     CM left  for Andie with [employer] infusion [phone] that she will now need to run IV benefits for pt with the Ambetter insurance d/t previously checked under R benefits and to call CM back.    CM is now attempting to obtain HC services for patient.       The following agencies that usually take Ambetter have declined:     [facility] has no RN in [place].    [facility]- only have one part time RN and no coverage in [place] where pt lives.    [address] no longer accepts ambetter insurance.    [facility], spoke to Mary in intake and they do not accept pt's insurance.     [facility] [phone] is seeing if they can accept and will call CM back.    Cm called [facility] [phone] and spoke to Daniel and faxed to her at [phone] : H & P, demographics, MD, ID and surgery notes. Waiting for call back after they review.    CM called [facility] [phone] and spoke to Andie who states they have skilled nursing and accept Ambetter but CM will need to fax referral for intake to review and see if they can accept. CM faxed all paperwork to [phone]. Waiting to hear back.    [facility] does not take insurance.     HOWARD called Leo with [facility] care [phone] and left  asking for call back to see if they accept pt's insurance.    [facility] cannot accept insurance.     Patient needs HC not just for possible IV abx but pt has had surgery and has a new colostomy.    Kenia Walker, RN, BSN  [phone]     Updated at 1:39 pm     [facility]  declined d/t does not take insurance.     Kenia Walker RN, BSN  [phone]     Updated at 2:15 PM    [facility] cannot accept unless it is billed through [employer] and per Andie with [employer] cannot be billed through 
CM met with patient and spouse.    No home care preference.    CM called Andie with [employer] [phone] and left vm that pt may possibly discharge today if stable. Pt is 100% for IV antibiotics.    CM had home care secured but when  ran pt's UMR insurance it was inactive.    CM called Bruce in [employer] [address] who will try to verify with pt her insurance. CM also called spouse who is in pt's room and discussed the above. He verbalized understanding.    CM had sent PS message to ANA LANDEROS and he will try to make his abx plan today to prepare for discharge.     CM updated pt's RN on the above.       Kenia Walker RN, BSN  [phone]     Updated at 11:49 AM    CM took pt's  down to Bruce in [employer]. He states pt's UMR insurance stopped on 10/31 and she has Ambetter that started 11/1/24 and he has a picture of it from an email due to her card won't arrive until 11/17.    CM will continue to follow and try to obtain home care for insurance pt now has as long as it is active.     Kenia Walker RN, BSN  [phone]       
CM reviewed chart for discharge planning.    Pt had surgery 10/31. Currently has NG tube/ NAVARRO drain/ ostomy.    Pt on IV abx. Per notes [employer] states IV benefits covered 100% if needed at home.     Per surgery note possible secondary wound closure tomorrow.     Pt needs therapy evaluations.     ID and oncology following.    Cm will follow for discharge plan and needs.     Kenia Walker RN, BSN  [phone]       
Called [employer] to run patient's home IV benefits in case she needs this for discharge.  Left Andie a message, [phone].  Waiting on a call back.    Electronically signed by MITCHELL Sim, FREDW on 10/28/2024 at 1:37 PM    
Case Management -  Discharge Note      Patient Name: Stephani Reyes                   YOB: 1970            Readmission Risk (Low < 19, Mod (19-27), High > 27): Readmission Risk Score: 12.7    Current PCP: Reyes, Eleia J, MD      (IMM) Important Message from Medicare:    Has pt received appropriate IMM before discharge if required: N/A  Date:    PT AM-PAC: 18 /24  OT AM-PAC: 22 /24    [facility]  [address] [place] [address]  Phone: [phone]  Fax: [phone]     [facility]  [address] [place] [address]  Phone: [phone]  Fax: [phone]       First appointment:   Enter at Same Day Surgery Entrance (right of main hospital entry)     Financial    Payor: UMR / Plan: UMR / Product Type: *No Product type* /     Pharmacy:  Potential assistance Purchasing Medications:    Meds-to-Beds request: Yes      [facility] [id number] [facility] [address] [place] [address] [phone] - F [phone]  [address] [place] [address]  Phone: [phone] Fax: [phone]    [facility] [address] [facility] [address] [facility] - P [phone] - F [phone]  [address] [facility] [address]  Phone: [phone] Fax: [phone]    [facility] [id number] [facility] [address] [facility] - P [phone] - F [phone]  [address] [facility] [address]  Phone: [phone] Fax: [phone]      Notes:    Additional Case Management Notes: Patient will discharge home today with her spouse. Patient/spouse were educated on administering the IV medications by Julianne/VAD nurse. Staff nurse educated both on the patients new colostomy. The patient has a surgical incision that the patient will follow-up with Dr Nolasco next Thursday. The patient will need to to go to the [facility] weekly for labs and port-a-cath dressing change. CM will have Germaine/admin call in the morning to schedule the patient at the 
Cm Admin was asked by Enoc NELSON/HOWADR to help. Patient needs an appointment with the infusion center for weekly labs and port a cath dressing change. Cm Admin called the infusion Center and spoke with the staff who states they will call the patient and schedule appointments with her.Pt aware, first appt on 11/12 per patient.   
Discharge Planning:     (CM) called and LVM for Andie with [employer], awaiting return call.    CM called [employer] and left a message for the on call pharmacist to call the CM.    Electronically signed by Enoc Roberts on 11/9/24 at 1:47 PM EST    
Discharge Planning:     (CM) reviewed the patient's chart to assess needs. Patient's Readmission Risk Score is 11%. Patient's medical insurance is R. Patient's PCP is REYES, ELEIA J .     No needs anticipated, at this time. CM team to follow. Staff to inform CM if additional discharge needs arise.     Electronically signed by Enoc Roberts on 10/23/24 at 8:57 AM EDT   
Discharge Planning:     (HOWARD) spoke with the patient and spouse who both report they do not want SNF. CM last week and today unable to find a [facility] due to the patients insurance.     CM called [facility] who will send out a VAD nurse to train on administration of the IV therapy at home.    CM phoned in IV therapy orders to [facility].    The patient will have to go to the infusion center weekly for labs and dressing change to port-a-cath. CM unable to schedule with the Infusion Center due to it being the weekend. CM will follow up in the morning.    HOWARD spoke to Dr Monroe to update on the plan of care. MD agrees and is ok with the plan as long as the patient receives the education on IV therapy and her new colostomy. She would like the patient to stay tonight and discharge in the morning to ensure the patient/spouse is comfortable with her Ivs and colostomy. Patient has a surgical wound that is to be left alone until the patient visits Dr Gonzalez next Thursday.     HOWARD updated the nurse/Sibo who has been training the spouse/patient on changing her colostomy bag and also IV infusions. The IV meds will arrive to the hospital tonight so the patient can be trained by the VAD nurse.     No further needs at this time.    Electronically signed by Enoc Roberts on 11/9/24 at 4:24 PM EST   
Received a call back from Andie at [facility] stating that patient would be covered at 100% for the current IV antibiotic she is on at this time.  Still undetermined if pt will need this at discharge.  ASA will continue to follow.    Electronically signed by MITCHELL Sim, GOLDIE on 10/29/2024 at 2:19 PM    
afternoon.    Pt has a port a cath. Had chemo in October.     CM will follow for pt progress and discharge needs and if IV abx needed at discharge.     The Plan for Transition of Care is related to the following treatment goals of Septicemia (HCC) [A41.9]  Intra-abdominal abscess (HCC) [K65.1]  Immunocompromised (HCC) [D84.9]  Sepsis (HCC) [A41.9]    IF APPLICABLE: The Patient and/or patient representative Stephani and her family were provided with a choice of provider and agrees with the discharge plan. Freedom of choice list with basic dialogue that supports the patient's individualized plan of care/goals and shares the quality data associated with the providers was provided to: Patient   Patient Representative Name:       The Patient and/or Patient Representative Agree with the Discharge Plan? Yes    Kenia Walker RN, BSN  [phone]

## 2024-11-11 NOTE — PROGRESS NOTES
Dr. Potter has placed a referral order for pharmacist to manage Outpatient Parental Antimicrobial Therapy (OPAT) pursuant the ID Collaborative Practice Agreement.     Pertinent PMH and HPI:  PMH gallbladder removal, hld, pre-diabetes, HepB    R ovarian CA  7/22/24 total hysterectomy, and BL oophorectomy   9/16/24 port placement  On chemotherapy     Presents 10/22 with sudden onset lower abdominal pain     Pertinent Objective Data:    Wt Readings from Last 1 Encounters:   11/07/24 39 kg (85 lb 15.7 oz)      BMI Readings from Last 1 Encounters:   11/07/24 18.60 kg/m²      Serum creatinine: 0.5 mg/dL (L) 11/10/24 0410  Estimated creatinine clearance: 80 mL/min (A)    Lab Results   Component Value Date    CRP 40.1 (H) 11/08/2024       Lab Results   Component Value Date    SEDRATE 47 (H) 11/08/2024       Imaging:   10/22 CTAP:  FINDINGS:  Lower chest: Heart size is normal. Lungs are right pleural effusion.  No  consolidation.     Liver: Liver is normal density. No enhancing masses.  Normal enhancement of  the intrahepatic vasculature.     Spleen:  Normal size.  No enhancing masses     Pancreas: No enhancing masses.  No ductal dilation. No adjacent fatty  stranding.     Gallbladder not seen     Bile ducts: Mild intrahepatic ductal dilation.  Extrahepatic bile duct is  normal.     Adrenals: The adrenal glands are unremarkable     Kidneys: Kidneys are normal in appearance.  No hydronephrosis.  No enhancing  masses. Norenal stones.  It in the vagina.     GI: No small bowel dilation.  No colonic wall thickening.  No large mass.  The stomach is unremarkable in appearance. Moderate amount of stool  throughout the colon.     Mesentery: Gas fluid collection in the left upper abdomen which does not  appear to be connected to the small bowel loops.  It does not a well-defined  capsule and measures approximately 2 cm x 3.5 cm.  Free fluid in the abdomen.  No free gas.  2nd fluid collection in the central portion of the

## 2024-11-11 NOTE — TELEPHONE ENCOUNTER
Spoke with Brian at [facility] and verified OPAT orders as follows:  Name and dose of Antimicrobial: IV ertapenem 1 g every 24 hour, IV fluconazole 200 mg daily   Antimicrobial start date: calculated from 11/8/2024   Antimicrobial completion date planned: 11/22/2024   Lab monitoring: CBC, Chem 12, ESR, CRP weekly    Spoke with patient who verified appt tomorrow at [facility]. She states her  is picking up linezolid and probiotic today. F/u appt scheduled for 11-21. She states chemo is on hold currently and Dr. Nolasco will be discussing with her onc Dr. LONDON. Her  is assisting with IV abx administration. V/u of office contact info for needs. She states no n/v/d, afebrile and has been ambulating in her house. Number provided for gen surg for f/u this week.

## 2024-11-12 ENCOUNTER — HOSPITAL ENCOUNTER (OUTPATIENT)
Dept: ONCOLOGY | Age: 54
Setting detail: INFUSION SERIES
Discharge: HOME | End: 2024-11-12
Payer: COMMERCIAL

## 2024-11-12 ENCOUNTER — TELEPHONE (OUTPATIENT)
Dept: INFECTIOUS DISEASES | Age: 54
End: 2024-11-12

## 2024-11-12 DIAGNOSIS — K65.1 INTRA-ABDOMINAL ABSCESS (HCC): Primary | ICD-10-CM

## 2024-11-12 LAB
ALBUMIN SERPL-MCNC: 3.6 G/DL (ref 3.4–5)
ALBUMIN/GLOB SERPL: 0.9 {RATIO} (ref 1.1–2.2)
ALP SERPL-CCNC: 90 U/L (ref 40–129)
ALT SERPL-CCNC: 9 U/L (ref 10–40)
ANION GAP SERPL CALCULATED.3IONS-SCNC: 11 MMOL/L (ref 3–16)
AST SERPL-CCNC: 16 U/L (ref 15–37)
BASOPHILS # BLD: 0 K/UL (ref 0–0.2)
BASOPHILS NFR BLD: 0.6 %
BILIRUB SERPL-MCNC: <0.2 MG/DL (ref 0–1)
BUN SERPL-MCNC: 9 MG/DL (ref 7–20)
CALCIUM SERPL-MCNC: 9.7 MG/DL (ref 8.3–10.6)
CHLORIDE SERPL-SCNC: 102 MMOL/L (ref 99–110)
CO2 SERPL-SCNC: 28 MMOL/L (ref 21–32)
CREAT SERPL-MCNC: 0.6 MG/DL (ref 0.6–1.1)
CRP SERPL-MCNC: 10.8 MG/L (ref 0–5.1)
DEPRECATED RDW RBC AUTO: 17 % (ref 12.4–15.4)
EOSINOPHIL # BLD: 0.3 K/UL (ref 0–0.6)
EOSINOPHIL NFR BLD: 11 %
ERYTHROCYTE [SEDIMENTATION RATE] IN BLOOD BY WESTERGREN METHOD: 67 MM/HR (ref 0–30)
GFR SERPLBLD CREATININE-BSD FMLA CKD-EPI: >90 ML/MIN/{1.73_M2}
GLUCOSE SERPL-MCNC: 136 MG/DL (ref 70–99)
HCT VFR BLD AUTO: 26.5 % (ref 36–48)
HGB BLD-MCNC: 8.6 G/DL (ref 12–16)
LYMPHOCYTES # BLD: 0.8 K/UL (ref 1–5.1)
LYMPHOCYTES NFR BLD: 26.9 %
MCH RBC QN AUTO: 27.8 PG (ref 26–34)
MCHC RBC AUTO-ENTMCNC: 32.3 G/DL (ref 31–36)
MCV RBC AUTO: 86.1 FL (ref 80–100)
MONOCYTES # BLD: 0.2 K/UL (ref 0–1.3)
MONOCYTES NFR BLD: 5.9 %
NEUTROPHILS # BLD: 1.7 K/UL (ref 1.7–7.7)
NEUTROPHILS NFR BLD: 55.6 %
PLATELET # BLD AUTO: 248 K/UL (ref 135–450)
PMV BLD AUTO: 7.6 FL (ref 5–10.5)
POTASSIUM SERPL-SCNC: 4.1 MMOL/L (ref 3.5–5.1)
PROT SERPL-MCNC: 7.6 G/DL (ref 6.4–8.2)
RBC # BLD AUTO: 3.08 M/UL (ref 4–5.2)
SODIUM SERPL-SCNC: 141 MMOL/L (ref 136–145)
WBC # BLD AUTO: 3.1 K/UL (ref 4–11)

## 2024-11-12 PROCEDURE — 85652 RBC SED RATE AUTOMATED: CPT

## 2024-11-12 PROCEDURE — 36591 DRAW BLOOD OFF VENOUS DEVICE: CPT

## 2024-11-12 PROCEDURE — 85025 COMPLETE CBC W/AUTO DIFF WBC: CPT

## 2024-11-12 PROCEDURE — 80053 COMPREHEN METABOLIC PANEL: CPT

## 2024-11-12 PROCEDURE — 99211 OFF/OP EST MAY X REQ PHY/QHP: CPT

## 2024-11-12 PROCEDURE — 86140 C-REACTIVE PROTEIN: CPT

## 2024-11-12 RX ORDER — SODIUM CHLORIDE 0.9 % (FLUSH) 0.9 %
5-40 SYRINGE (ML) INJECTION PRN
OUTPATIENT
Start: 2024-11-19

## 2024-11-12 RX ORDER — SODIUM CHLORIDE 0.9 % (FLUSH) 0.9 %
5-40 SYRINGE (ML) INJECTION PRN
Status: CANCELLED | OUTPATIENT
Start: 2024-11-19

## 2024-11-12 RX ORDER — SODIUM CHLORIDE 0.9 % (FLUSH) 0.9 %
5-40 SYRINGE (ML) INJECTION PRN
Status: DISCONTINUED | OUTPATIENT
Start: 2024-11-12 | End: 2024-11-13 | Stop reason: HOSPADM

## 2024-11-12 NOTE — TELEPHONE ENCOUNTER
----- Message from Jessica Durham RPH sent at 11/11/2024  3:59 PM EST -----  Order entered  ----- Message -----  From: Warren Potter MD  Sent: 11/11/2024   3:36 PM EST  To: Jessica Durham RPH; #    I think it would be a good idea to get the CT scan abdominal pelvis with IV contrast done for a follow-up of the abdominal abscesses before the stop date.  ----- Message -----  From: Jessica Durham RPH  Sent: 11/11/2024  10:04 AM EST  To: Warren Potter MD; #    Dr. Potter- Would you like repeat CTAP?  Pamela- please find out if she is going to hold chemo or continue it as this will affect our labs

## 2024-11-12 NOTE — TELEPHONE ENCOUNTER
Unable to schedule CT prior to 10 business days due to need for insurance approval per central scheduling. First date available 11-27-24, patient arrival 10am, NPO 4 hours prior.    Spoke with patient who v/u of above and will need to extend IV abx until this date to obtain results for determination of term decision. Will cancel f/u appt 12/21. Can we discuss results over the phone rather than make f/u due to the Thanksgiving holiday and no clinic availability?    [facility] and Kristin at [facility] and extended OPAT to 11-29 and they v/u

## 2024-11-12 NOTE — TELEPHONE ENCOUNTER
Extend abx until 11/29.  Therapy plan extended.   We will review CT after 11/27 and make determination over the phone.

## 2024-11-12 NOTE — PROGRESS NOTES
Patient ambulatory to infusion center for Port access change and blood draw.  Brisk blood return noted, and blood specimen collected.  Port flushed and de accessed.  Port re accessed with new needed per sterile technique.  Patient tolerated well.  Lines flushed, brisk blood return noted, alcohol caps applied.  Patient to return next Tuesday for same treatment.  Instructed patient to keep site dry and to call earlier to get in for dressing change is dressing becomes saturated or starts to come off at all. Patient verbally understands.  No other question or concerns stated.

## 2024-11-13 ENCOUNTER — OFFICE VISIT (OUTPATIENT)
Dept: PRIMARY CARE CLINIC | Age: 54
End: 2024-11-13

## 2024-11-13 VITALS
SYSTOLIC BLOOD PRESSURE: 114 MMHG | BODY MASS INDEX: 17.52 KG/M2 | WEIGHT: 81 LBS | OXYGEN SATURATION: 99 % | HEART RATE: 93 BPM | DIASTOLIC BLOOD PRESSURE: 78 MMHG

## 2024-11-13 DIAGNOSIS — K65.1 INTRA-ABDOMINAL ABSCESS (HCC): ICD-10-CM

## 2024-11-13 DIAGNOSIS — E44.0 MODERATE MALNUTRITION (HCC): Chronic | ICD-10-CM

## 2024-11-13 DIAGNOSIS — Z09 HOSPITAL DISCHARGE FOLLOW-UP: Primary | ICD-10-CM

## 2024-11-13 PROBLEM — R19.00 PELVIC MASS: Status: RESOLVED | Noted: 2024-09-04 | Resolved: 2024-11-13

## 2024-11-13 PROBLEM — K42.9 UMBILICAL HERNIA: Status: RESOLVED | Noted: 2024-06-11 | Resolved: 2024-11-13

## 2024-11-13 PROBLEM — D72.825 BANDEMIA: Status: RESOLVED | Noted: 2024-11-01 | Resolved: 2024-11-13

## 2024-11-13 PROBLEM — K66.0 INTESTINAL ADHESIONS: Status: RESOLVED | Noted: 2024-10-31 | Resolved: 2024-11-13

## 2024-11-13 PROBLEM — S31.109A OPEN ABDOMINAL WALL WOUND: Status: RESOLVED | Noted: 2024-11-06 | Resolved: 2024-11-13

## 2024-11-13 PROBLEM — K63.1 PERFORATED SIGMOID COLON (HCC): Status: RESOLVED | Noted: 2024-10-31 | Resolved: 2024-11-13

## 2024-11-13 PROBLEM — R19.00 INTRAABDOMINAL MASS: Status: RESOLVED | Noted: 2024-06-17 | Resolved: 2024-11-13

## 2024-11-13 PROBLEM — K80.20 SYMPTOMATIC CHOLELITHIASIS: Status: RESOLVED | Noted: 2024-06-11 | Resolved: 2024-11-13

## 2024-11-13 PROBLEM — K80.20 CALCULUS OF GALLBLADDER WITHOUT CHOLECYSTITIS WITHOUT OBSTRUCTION: Status: RESOLVED | Noted: 2023-10-02 | Resolved: 2024-11-13

## 2024-11-13 PROBLEM — A41.9 SEPTICEMIA (HCC): Status: RESOLVED | Noted: 2024-10-22 | Resolved: 2024-11-13

## 2024-11-13 NOTE — PROGRESS NOTES
Chief Complaint   Patient presents with    Follow-Up from Hospital     10/22/2024 - 11/10/2024 (19 days) [facility]             Subjective:       Stephani Reyes is a 53 y.o. female who for hospital discharge follow-up on 10/22/2024 after an intra-abdominal abscess and septicemia.  Had exploratory laparotomy on 10/31/2024 and had colon resection, drainage of intra-abdominal abscess, colostomy creation.  Patient had repeated hospitalization since she was diagnosed with ovarian cancer in July 2024.  Patient's status post robotic hysterectomy and bilateral salpingo-oophorectomy on 7/22/2024.  Had port placement on 9/16/2024.  Patient was seen in my office on 8/29/2024 as a new patient presented with symptomatic cholelithiasis and was referred to surgeon.  During laparoscopic cholecystectomy on 6/17/2024, Dr. Herrera found a pelvic mass and biopsy showed müllerian endometrioid adenocarcinoma.  Patient has stage IIa endometrioid ovarian cancer.  Chemotx is currently on hold until infection clears.  Had received 2 chemo treatment every 21 days. Has a follow-up appointment with Dr. Herrera tomorrow.  Patient is trying her best to eat since every 2 hours although is just a few bites.  She is in good spirits.    ID consulted, Dr. Potter, recommended IV ertapenem 1g x 24 hrs, IV Diflucan 200 mg daily and p.o. Xyvox on discharge for 2 weeks from 11/08/2024, end date 11/22/2024.  Just recently extended to 3 weeks and scheduled for CT scan of the abdomen pelvis follow-up on November 27.    Current Outpatient Medications   Medication Sig Dispense Refill    fluconazole (DIFLUCAN) 2MG/ML in 0.9 % sodium chloride IVPB Infuse 100 mLs intravenously every 24 hours for 11 days 1100 mL 0    lactobacillus (CULTURELLE) capsule Take 1 capsule by mouth 2 times daily (with meals) for 14 days 28 capsule 0    linezolid (ZYVOX) 600 MG tablet Take 1 tablet by mouth every 12 hours for 14 days 28 tablet 0    gabapentin

## 2024-11-14 ENCOUNTER — OFFICE VISIT (OUTPATIENT)
Dept: SURGERY | Age: 54
End: 2024-11-14

## 2024-11-14 VITALS — BODY MASS INDEX: 17.52 KG/M2 | SYSTOLIC BLOOD PRESSURE: 114 MMHG | DIASTOLIC BLOOD PRESSURE: 78 MMHG | WEIGHT: 81 LBS

## 2024-11-14 DIAGNOSIS — R19.00 PELVIC MASS: Primary | ICD-10-CM

## 2024-11-14 PROCEDURE — 99024 POSTOP FOLLOW-UP VISIT: CPT | Performed by: SURGERY

## 2024-11-14 NOTE — PROGRESS NOTES
[facility] GENERAL AND LAPAROSCOPIC SURGERY          PATIENT NAME: Stephani Reyes     TODAY'S DATE: 11/14/2024    SUBJECTIVE:    Pt up and active, no complaints.  Doing well with colostomy bag.     OBJECTIVE:  VITALS:  /78   Wt 36.7 kg (81 lb)   LMP 04/17/2013 (Approximate) Comment: No period in almost 4 years.  BMI 17.52 kg/m²     CONSTITUTIONAL:  awake and alert  LUNGS:  clear to auscultation  HEART: RRR  ABDOMEN:  normal bowel sounds, soft, non-distended, non-tender, incision healed, BRIGIDA removed     Data:  CBC:   Recent Labs     11/12/24  1039   WBC 3.1*   HGB 8.6*   HCT 26.5*        BMP:    Recent Labs     11/12/24  1039      K 4.1      CO2 28   BUN 9   CREATININE 0.6   GLUCOSE 136*     Hepatic:   Recent Labs     11/12/24  1039   AST 16   ALT 9*   BILITOT <0.2   ALKPHOS 90     Mag:    No results for input(s): \"MG\" in the last 72 hours.   Phos:   No results for input(s): \"PHOS\" in the last 72 hours.   INR: No results for input(s): \"INR\" in the last 72 hours.    Radiology Review:  None      ASSESSMENT AND PLAN:    S/P ex lap Hernandez's, abscess drainage, had rectosigmoid colon perforation  Drains out, wound healed  Incision looks good  See again next week      Iam Nolasco MD

## 2024-11-19 ENCOUNTER — OFFICE VISIT (OUTPATIENT)
Dept: SURGERY | Age: 54
End: 2024-11-19

## 2024-11-19 ENCOUNTER — HOSPITAL ENCOUNTER (OUTPATIENT)
Dept: ONCOLOGY | Age: 54
Setting detail: INFUSION SERIES
Discharge: HOME | End: 2024-11-19
Payer: COMMERCIAL

## 2024-11-19 VITALS — DIASTOLIC BLOOD PRESSURE: 78 MMHG | WEIGHT: 81.8 LBS | BODY MASS INDEX: 17.7 KG/M2 | SYSTOLIC BLOOD PRESSURE: 114 MMHG

## 2024-11-19 DIAGNOSIS — R19.00 PELVIC MASS: Primary | ICD-10-CM

## 2024-11-19 DIAGNOSIS — K65.1 INTRA-ABDOMINAL ABSCESS (HCC): Primary | ICD-10-CM

## 2024-11-19 LAB
ALBUMIN SERPL-MCNC: 3.9 G/DL (ref 3.4–5)
ALBUMIN/GLOB SERPL: 1.1 {RATIO} (ref 1.1–2.2)
ALP SERPL-CCNC: 86 U/L (ref 40–129)
ALT SERPL-CCNC: 9 U/L (ref 10–40)
ANION GAP SERPL CALCULATED.3IONS-SCNC: 9 MMOL/L (ref 3–16)
AST SERPL-CCNC: 20 U/L (ref 15–37)
BASOPHILS # BLD: 0 K/UL (ref 0–0.2)
BASOPHILS NFR BLD: 1.5 %
BILIRUB SERPL-MCNC: 0.3 MG/DL (ref 0–1)
BUN SERPL-MCNC: 13 MG/DL (ref 7–20)
CALCIUM SERPL-MCNC: 9.5 MG/DL (ref 8.3–10.6)
CHLORIDE SERPL-SCNC: 100 MMOL/L (ref 99–110)
CO2 SERPL-SCNC: 27 MMOL/L (ref 21–32)
CREAT SERPL-MCNC: 0.5 MG/DL (ref 0.6–1.1)
CRP SERPL-MCNC: <3 MG/L (ref 0–5.1)
DEPRECATED RDW RBC AUTO: 19.2 % (ref 12.4–15.4)
EOSINOPHIL # BLD: 0.1 K/UL (ref 0–0.6)
EOSINOPHIL NFR BLD: 4.1 %
ERYTHROCYTE [SEDIMENTATION RATE] IN BLOOD BY WESTERGREN METHOD: 34 MM/HR (ref 0–30)
GFR SERPLBLD CREATININE-BSD FMLA CKD-EPI: >90 ML/MIN/{1.73_M2}
GLUCOSE SERPL-MCNC: 107 MG/DL (ref 70–99)
HCT VFR BLD AUTO: 24.5 % (ref 36–48)
HGB BLD-MCNC: 8.1 G/DL (ref 12–16)
LYMPHOCYTES # BLD: 0.8 K/UL (ref 1–5.1)
LYMPHOCYTES NFR BLD: 38.6 %
MCH RBC QN AUTO: 28.5 PG (ref 26–34)
MCHC RBC AUTO-ENTMCNC: 32.9 G/DL (ref 31–36)
MCV RBC AUTO: 86.8 FL (ref 80–100)
MONOCYTES # BLD: 0.1 K/UL (ref 0–1.3)
MONOCYTES NFR BLD: 5.3 %
NEUTROPHILS # BLD: 1 K/UL (ref 1.7–7.7)
NEUTROPHILS NFR BLD: 50.5 %
PLATELET # BLD AUTO: 261 K/UL (ref 135–450)
PMV BLD AUTO: 6.8 FL (ref 5–10.5)
POTASSIUM SERPL-SCNC: 4.4 MMOL/L (ref 3.5–5.1)
PROT SERPL-MCNC: 7.3 G/DL (ref 6.4–8.2)
RBC # BLD AUTO: 2.83 M/UL (ref 4–5.2)
SODIUM SERPL-SCNC: 136 MMOL/L (ref 136–145)
WBC # BLD AUTO: 2 K/UL (ref 4–11)

## 2024-11-19 PROCEDURE — 99211 OFF/OP EST MAY X REQ PHY/QHP: CPT

## 2024-11-19 PROCEDURE — 80053 COMPREHEN METABOLIC PANEL: CPT

## 2024-11-19 PROCEDURE — 86140 C-REACTIVE PROTEIN: CPT

## 2024-11-19 PROCEDURE — 99024 POSTOP FOLLOW-UP VISIT: CPT | Performed by: SURGERY

## 2024-11-19 PROCEDURE — 36591 DRAW BLOOD OFF VENOUS DEVICE: CPT

## 2024-11-19 PROCEDURE — 96523 IRRIG DRUG DELIVERY DEVICE: CPT

## 2024-11-19 PROCEDURE — 85652 RBC SED RATE AUTOMATED: CPT

## 2024-11-19 PROCEDURE — 85025 COMPLETE CBC W/AUTO DIFF WBC: CPT

## 2024-11-19 RX ORDER — SODIUM CHLORIDE 0.9 % (FLUSH) 0.9 %
5-40 SYRINGE (ML) INJECTION PRN
OUTPATIENT
Start: 2024-11-26

## 2024-11-19 RX ORDER — SODIUM CHLORIDE 0.9 % (FLUSH) 0.9 %
5-40 SYRINGE (ML) INJECTION PRN
Status: DISCONTINUED | OUTPATIENT
Start: 2024-11-19 | End: 2024-11-20 | Stop reason: HOSPADM

## 2024-11-19 NOTE — PROGRESS NOTES
[facility] GENERAL AND LAPAROSCOPIC SURGERY          PATIENT NAME: Stephani Reyes     TODAY'S DATE: 11/19/2024    SUBJECTIVE:    Pt doing well overall.  Normal diet, no pain, maintaining stomal function.    OBJECTIVE:  VITALS:  /78   LMP 04/17/2013 (Approximate) Comment: No period in almost 4 years.    CONSTITUTIONAL:  awake and alert  LUNGS:  clear to auscultation  ABDOMEN:  normal bowel sounds, soft, non-distended, non-tender, incision healed     Data:      Radiology Review:  None      ASSESSMENT AND PLAN:  S/P Ex lap, Hernandez's, abscess drainage  Doing well, healed post op  Staples out  Stop antibiotics when Dr. Potter ready  Maintaining stoma well  Will be ok to return to Oncology care plan in December  We would consider exploration and colostomy closure in 6 mos or so - no urgency      Iam Nolasco MD

## 2024-11-20 ENCOUNTER — TELEPHONE (OUTPATIENT)
Dept: INFECTIOUS DISEASES | Age: 54
End: 2024-11-20

## 2024-11-20 NOTE — TELEPHONE ENCOUNTER
OPAT Nurse Coordinator Weekly Update Note    Current OPAT plan:  P.o. linezolid 600 milligram every 12 hour, IV ertapenem 1g every 24 hour, IV fluconazole 200 mg daily through 11/29    Diagnosis:   Intra-abdominal abscesses     Assessment:  Spoke with patient who states she is feeling good. Compliant with all IV abx and afebrile. Denies D/N/V. Ostomy working well and saw gen surg with staple removal. Appetite good and getting some energy back. V/u of CT 11/27 and this office will f/u with her 11/29 after holiday with results and term decision. Office contact info reviewed for needs    Follow up appts:  11/27 CT

## 2024-11-26 ENCOUNTER — TELEPHONE (OUTPATIENT)
Dept: INFECTIOUS DISEASES | Age: 54
End: 2024-11-26

## 2024-11-26 ENCOUNTER — HOSPITAL ENCOUNTER (OUTPATIENT)
Dept: ONCOLOGY | Age: 54
Setting detail: INFUSION SERIES
Discharge: HOME | End: 2024-11-26
Payer: COMMERCIAL

## 2024-11-26 VITALS — RESPIRATION RATE: 18 BRPM | DIASTOLIC BLOOD PRESSURE: 81 MMHG | SYSTOLIC BLOOD PRESSURE: 117 MMHG | HEART RATE: 92 BPM

## 2024-11-26 DIAGNOSIS — K65.1 INTRA-ABDOMINAL ABSCESS (HCC): Primary | ICD-10-CM

## 2024-11-26 LAB
ALBUMIN SERPL-MCNC: 4.1 G/DL (ref 3.4–5)
ALBUMIN/GLOB SERPL: 1.3 {RATIO} (ref 1.1–2.2)
ALP SERPL-CCNC: 85 U/L (ref 40–129)
ALT SERPL-CCNC: 12 U/L (ref 10–40)
ANION GAP SERPL CALCULATED.3IONS-SCNC: 12 MMOL/L (ref 3–16)
AST SERPL-CCNC: 15 U/L (ref 15–37)
BASOPHILS # BLD: 0 K/UL (ref 0–0.2)
BASOPHILS NFR BLD: 1.1 %
BILIRUB SERPL-MCNC: 0.3 MG/DL (ref 0–1)
BUN SERPL-MCNC: 14 MG/DL (ref 7–20)
CALCIUM SERPL-MCNC: 9.6 MG/DL (ref 8.3–10.6)
CHLORIDE SERPL-SCNC: 102 MMOL/L (ref 99–110)
CO2 SERPL-SCNC: 27 MMOL/L (ref 21–32)
CREAT SERPL-MCNC: 0.5 MG/DL (ref 0.6–1.1)
CRP SERPL-MCNC: <3 MG/L (ref 0–5.1)
DEPRECATED RDW RBC AUTO: 21 % (ref 12.4–15.4)
EOSINOPHIL # BLD: 0.1 K/UL (ref 0–0.6)
EOSINOPHIL NFR BLD: 5.3 %
ERYTHROCYTE [SEDIMENTATION RATE] IN BLOOD BY WESTERGREN METHOD: 18 MM/HR (ref 0–30)
GFR SERPLBLD CREATININE-BSD FMLA CKD-EPI: >90 ML/MIN/{1.73_M2}
GLUCOSE SERPL-MCNC: 134 MG/DL (ref 70–99)
HCT VFR BLD AUTO: 22.8 % (ref 36–48)
HGB BLD-MCNC: 7.8 G/DL (ref 12–16)
LYMPHOCYTES # BLD: 0.6 K/UL (ref 1–5.1)
LYMPHOCYTES NFR BLD: 47.3 %
MCH RBC QN AUTO: 30 PG (ref 26–34)
MCHC RBC AUTO-ENTMCNC: 34 G/DL (ref 31–36)
MCV RBC AUTO: 88.3 FL (ref 80–100)
MONOCYTES # BLD: 0.1 K/UL (ref 0–1.3)
MONOCYTES NFR BLD: 8.5 %
NEUTROPHILS # BLD: 0.5 K/UL (ref 1.7–7.7)
NEUTROPHILS NFR BLD: 37.8 %
PLATELET # BLD AUTO: 162 K/UL (ref 135–450)
PLATELET BLD QL SMEAR: ADEQUATE
PMV BLD AUTO: 7.1 FL (ref 5–10.5)
POTASSIUM SERPL-SCNC: 4.2 MMOL/L (ref 3.5–5.1)
PROT SERPL-MCNC: 7.2 G/DL (ref 6.4–8.2)
RBC # BLD AUTO: 2.59 M/UL (ref 4–5.2)
SLIDE REVIEW: ABNORMAL
SODIUM SERPL-SCNC: 141 MMOL/L (ref 136–145)
WBC # BLD AUTO: 1.3 K/UL (ref 4–11)

## 2024-11-26 PROCEDURE — 85025 COMPLETE CBC W/AUTO DIFF WBC: CPT

## 2024-11-26 PROCEDURE — 80053 COMPREHEN METABOLIC PANEL: CPT

## 2024-11-26 PROCEDURE — 85652 RBC SED RATE AUTOMATED: CPT

## 2024-11-26 PROCEDURE — 86140 C-REACTIVE PROTEIN: CPT

## 2024-11-26 PROCEDURE — 99211 OFF/OP EST MAY X REQ PHY/QHP: CPT

## 2024-11-26 PROCEDURE — 36591 DRAW BLOOD OFF VENOUS DEVICE: CPT

## 2024-11-26 RX ORDER — SODIUM CHLORIDE 0.9 % (FLUSH) 0.9 %
5-40 SYRINGE (ML) INJECTION PRN
Status: DISCONTINUED | OUTPATIENT
Start: 2024-11-26 | End: 2024-11-27 | Stop reason: HOSPADM

## 2024-11-26 RX ORDER — SODIUM CHLORIDE 0.9 % (FLUSH) 0.9 %
5-40 SYRINGE (ML) INJECTION PRN
Status: CANCELLED | OUTPATIENT
Start: 2024-11-26

## 2024-11-26 NOTE — PROGRESS NOTES
Patient to infusion center for labs and hueber needle change.  Port deaccessed and accessed per [facility] policy, sterile technique maintained and patient tolerated well.  Patient had critical results of WBC 1.3 and 0.5 absolute neutrophils.  Jessica Durham pharmacist notified of critical levels.

## 2024-11-26 NOTE — TELEPHONE ENCOUNTER
OPAT patient with R ovarian CA s/p hysterectomy in July.    Developed abdominal pain and was found to have multiple intra-abdominal abscesses.    Underwent drainage and colon resection 10/31.  Cultures grew pan-S EColi, EFaecalis, lactobacillus, and prevotella     Was discharged on 4 week course of IV ertapenem, IV fluconazole, and PO linezolid.   She has been adherent to this regimen and has been doing well at home.    Had port maintenance and labs done at [facility] today.  Clinical labs WNL and reassuring. CRP and ESR WNL.    However, WBC and ANC dropped to 1.3 and 0.5 respectively.    Called patient and verified that her chemo has been on hold since 10/21/24.    Instructed patient to hold all abx at this time to minimize risk of further cytopenia.     Due for CT tomorrow at 1130.  Ideally abscesses are resolved and we can de-access port.    If residual but smaller, may be able to do PO Augmentin and still de-access port.       [facility] is not open Thurs or Fri so she may need to go to [facility] vs go to  ED to have port de-accessed and repeat CBCwdiff done.    Will also have ID RN fax CBC to heme/onc Dr. Thacker for visibility.     Jessica Durham, PharmD, BCPS  Clinical Pharmacy Specialist- [facility] Infectious Disease  Phone: [phone] (also available on [employer])  Fax: [phone]    For Pharmacy Admin Tracking Only    Program: Medical Group  CPA in place: Yes  Time Spent (min): 20

## 2024-11-27 ENCOUNTER — HOSPITAL ENCOUNTER (OUTPATIENT)
Dept: CT IMAGING | Age: 54
Discharge: HOME | End: 2024-11-27
Payer: COMMERCIAL

## 2024-11-27 DIAGNOSIS — K65.1 INTRA-ABDOMINAL ABSCESS (HCC): ICD-10-CM

## 2024-11-27 PROCEDURE — 6360000004 HC RX CONTRAST MEDICATION: Performed by: PHARMACIST

## 2024-11-27 PROCEDURE — 74177 CT ABD & PELVIS W/CONTRAST: CPT

## 2024-11-27 RX ORDER — IOPAMIDOL 755 MG/ML
75 INJECTION, SOLUTION INTRAVASCULAR
Status: COMPLETED | OUTPATIENT
Start: 2024-11-27 | End: 2024-11-27

## 2024-11-27 RX ADMIN — IOPAMIDOL 75 ML: 755 INJECTION, SOLUTION INTRAVENOUS at 11:35

## 2024-11-27 NOTE — TELEPHONE ENCOUNTER
Patient did CT today but still in process.  Likely won't result before holiday/office closure.    Advised patient to flush accessed port each day.  Still hold on abx.    Will wait until Friday for next steps but will need some location for repeat labs and de-access.

## 2024-11-29 ENCOUNTER — HOSPITAL ENCOUNTER (OUTPATIENT)
Dept: ONCOLOGY | Age: 54
Setting detail: INFUSION SERIES
Discharge: HOME | End: 2024-11-29
Payer: COMMERCIAL

## 2024-11-29 PROCEDURE — 96523 IRRIG DRUG DELIVERY DEVICE: CPT

## 2024-11-29 NOTE — TELEPHONE ENCOUNTER
Called patient and made her aware. She stated she would like her port de accessed today. I informed her I would call [facility] and have them schedule an appointment with her if they were open.  If they are not, I will update her.    I called [facility] and spoke with Nicci. I informed her of patient needing port de accessed. She verified they did have appointments for today. I informed her of patient and asked her to call and schedule an appointment.  She verbalized understanding.     Called [facility] and spoke with Teodora. Informed her IV abx ending as planned and port being de accessed.  She verified orders and verbalized understanding.     Please verify port de accessed on Monday 12/2

## 2024-11-29 NOTE — TELEPHONE ENCOUNTER
Intra-abdominal abscesses resolved.  Patient can be done with abx.  Needs repeat CBCwdiff and port de-accessed.  Can go to [facility] if they are open or [facility] ED.

## 2024-12-10 ENCOUNTER — TELEPHONE (OUTPATIENT)
Dept: SURGERY | Age: 54
End: 2024-12-10

## 2024-12-10 NOTE — TELEPHONE ENCOUNTER
I called and talked to Huber, he stated that it was bright red blood and only 1 time occurrence. I also consulted with Dr. Lindsey since he is in the office and Dr. Nolasco is out of town. Dr. Lindsey stated that sometimes the body will \"release\" some blood out of the rectum, especially pt's current diagnosis. Advised for pt and  to monitor when she goes to the bathroom. If there is more blood, pt should go to the ER to be evaluated. Pt and Huber were advised and understand instructions.

## 2024-12-10 NOTE — TELEPHONE ENCOUNTER
Huber called, Pt states that when she went to the rest room today and wiped she had blood coming out of her rectum. Was a lot blood. She is very concerned and wants to know if this is normal after a colostomy? Please advise Huber [phone]

## 2024-12-16 ENCOUNTER — TELEPHONE (OUTPATIENT)
Dept: SURGERY | Age: 54
End: 2024-12-16

## 2024-12-16 NOTE — TELEPHONE ENCOUNTER
PT's  called and stated that they wanted to know if Dr Nolasco called about resuming chemo on pt    Please Advise

## 2024-12-17 NOTE — TELEPHONE ENCOUNTER
I talked to Dr. Nolasco, pt can resume chemo.   Tried to call Huber, no answer. LM for him to call back.

## 2025-03-30 NOTE — PROGRESS NOTES
Ronald Credit  : 1970  Encounter date: 2021    This trey 48 y.o. female who presents with  Chief Complaint   Patient presents with    Follow-up     med check/med refill       History of present illness:    HPI Pt is 48year old female for hypertension, taking lisinopril 10 mg.  PT has never had labs completed. No new concerns. Pt is due for upcoming PE 2022. Current Outpatient Medications on File Prior to Visit   Medication Sig Dispense Refill    Cholecalciferol 50 MCG ( UT) CAPS Take 2,000 Units by mouth daily      Omega-3 Fatty Acids (FISH OIL) 1000 MG CAPS Take 3,000 mg by mouth 3 times daily       No current facility-administered medications on file prior to visit. Allergies   Allergen Reactions    Crab Extract Allergy Skin Test     Seasonal     Shellfish Allergy     Shrimp Extract Allergy Skin Test      History reviewed. No pertinent past medical history. History reviewed. No pertinent surgical history. History reviewed. No pertinent family history. Social History     Tobacco Use    Smoking status: Never Smoker    Smokeless tobacco: Never Used   Substance Use Topics    Alcohol use: Yes     Comment: ocasionally        Review of Systems    Objective:    /72 (Site: Left Upper Arm, Position: Sitting, Cuff Size: Medium Adult)   Pulse 86   Temp 98.4 °F (36.9 °C)   Wt 127 lb (57.6 kg)   SpO2 97%   BMI 26.54 kg/m²   Weight: 127 lb (57.6 kg)     BP Readings from Last 3 Encounters:   21 112/72   21 122/78     Wt Readings from Last 3 Encounters:   21 127 lb (57.6 kg)   21 120 lb (54.4 kg)     BMI Readings from Last 3 Encounters:   21 26.54 kg/m²   21 25.08 kg/m²       Physical Exam  Vitals reviewed. Constitutional:       Appearance: Normal appearance. She is well-developed. Cardiovascular:      Rate and Rhythm: Normal rate and regular rhythm. Pulses: Normal pulses. Heart sounds: Normal heart sounds.  No murmur n/a heard.      Pulmonary:      Effort: Pulmonary effort is normal.      Breath sounds: Normal breath sounds. Musculoskeletal:      Right lower leg: No edema. Left lower leg: No edema. Skin:     General: Skin is warm and dry. Neurological:      Mental Status: She is alert and oriented to person, place, and time. Psychiatric:         Mood and Affect: Mood normal.         Assessment/Plan    1. Essential hypertension  Controlled  Advised to have labs completed  Routine monitoring  - lisinopril (PRINIVIL;ZESTRIL) 10 MG tablet; Take 1 tablet by mouth daily  Dispense: 90 tablet; Refill: 1      Return in about 3 months (around 2/22/2022) for annual check up. This dictation was generated by voice recognition computer software. Although all attempts are made to edit the dictation for accuracy, there may be errors in the transcription that are not intended. n/a

## 2025-05-21 ENCOUNTER — TELEPHONE (OUTPATIENT)
Dept: SURGERY | Age: 55
End: 2025-05-21

## 2025-05-21 NOTE — TELEPHONE ENCOUNTER
Per Dr. Nolasco:    Yes we can schedule colostomy closure. I would like to see in office preop also to go over surgery plans and recovery.         I called and LM for Huber/Stephani (pt) to call back. Pt needs an appointment to discuss colostomy reversal.

## 2025-05-21 NOTE — TELEPHONE ENCOUNTER
Dr. Nolasco,  Please review and advise if you want pt to be seen first or can go ahead and schedule surgery.

## 2025-05-21 NOTE — TELEPHONE ENCOUNTER
Pt's  stated pt has finished chemo and per pt's spouse Dr. Nolasco stated once finished chemo pt can schedule surgery to repair the colon.   Please call pt's spouse Huber and advise.

## 2025-05-29 ENCOUNTER — OFFICE VISIT (OUTPATIENT)
Dept: SURGERY | Age: 55
End: 2025-05-29
Payer: COMMERCIAL

## 2025-05-29 VITALS — SYSTOLIC BLOOD PRESSURE: 109 MMHG | WEIGHT: 110.2 LBS | DIASTOLIC BLOOD PRESSURE: 72 MMHG | BODY MASS INDEX: 23.84 KG/M2

## 2025-05-29 DIAGNOSIS — C56.9 MALIGNANT NEOPLASM OF OVARY, UNSPECIFIED LATERALITY (HCC): Primary | ICD-10-CM

## 2025-05-29 DIAGNOSIS — K57.32 SIGMOID DIVERTICULITIS: ICD-10-CM

## 2025-05-29 PROCEDURE — 99213 OFFICE O/P EST LOW 20 MIN: CPT | Performed by: SURGERY

## 2025-05-29 NOTE — PROGRESS NOTES
[facility] GENERAL AND LAPAROSCOPIC SURGERY          PATIENT NAME: Stephani Reyes     TODAY'S DATE: 5/29/2025    CC: Colostomy    SUBJECTIVE:    Pt has had weight gain, doing exercise. Pt has had colon resection / colostomy. Complicated recovery from hysterectomy with rectosigmoid perforation and abscess.  No emesis, no CP or SOB. Finished chemo, and did have surgery for Ov cancer. Managing stoma well.  Denies rectal or vaginal drainage. No UTI symptoms.     OBJECTIVE:  VITALS:  Wt 50 kg (110 lb 3.2 oz)   LMP 04/17/2013 (Approximate) Comment: No period in almost 4 years.  BMI 23.84 kg/m²     CONSTITUTIONAL:  awake and alert  LUNGS:  clear to auscultation  HEART: RRR  ABDOMEN:  normal bowel sounds, soft, non-distended, non-tender, prior incision healed, colostomy in place.   EXTR: no edema    Data:      Radiology Review:  No recent studies      ASSESSMENT AND PLAN:  Colostomy in place  Complex prior surgical issues, resection, cancer, stoma, prior abscess  Will do follow up CT and likely plan stomal reversal surgery.  DW pt and , all questions answered, agree with plans    Iam Nolasco MD

## 2025-06-10 ENCOUNTER — HOSPITAL ENCOUNTER (OUTPATIENT)
Dept: CT IMAGING | Age: 55
Discharge: HOME | End: 2025-06-10
Attending: SURGERY
Payer: COMMERCIAL

## 2025-06-10 DIAGNOSIS — C56.9 MALIGNANT NEOPLASM OF OVARY, UNSPECIFIED LATERALITY (HCC): ICD-10-CM

## 2025-06-10 DIAGNOSIS — K57.32 SIGMOID DIVERTICULITIS: ICD-10-CM

## 2025-06-10 PROCEDURE — 6360000004 HC RX CONTRAST MEDICATION: Performed by: SURGERY

## 2025-06-10 PROCEDURE — 74177 CT ABD & PELVIS W/CONTRAST: CPT

## 2025-06-10 RX ORDER — IOPAMIDOL 755 MG/ML
75 INJECTION, SOLUTION INTRAVASCULAR
Status: COMPLETED | OUTPATIENT
Start: 2025-06-10 | End: 2025-06-10

## 2025-06-10 RX ADMIN — IOPAMIDOL 75 ML: 755 INJECTION, SOLUTION INTRAVENOUS at 07:55

## 2025-06-10 RX ADMIN — IOHEXOL 25 ML: 350 INJECTION, SOLUTION INTRAVENOUS at 07:55

## 2025-06-16 ENCOUNTER — PREP FOR PROCEDURE (OUTPATIENT)
Dept: SURGERY | Age: 55
End: 2025-06-16

## 2025-06-16 ENCOUNTER — TELEPHONE (OUTPATIENT)
Dept: SURGERY | Age: 55
End: 2025-06-16

## 2025-06-16 DIAGNOSIS — Z93.3 COLOSTOMY IN PLACE (HCC): ICD-10-CM

## 2025-06-16 NOTE — TELEPHONE ENCOUNTER
I called pt and scheduled her surgery for 7/16/25 at 730 am, arrival time of 6 am. All other instructions will come from PAT.   Never smoker

## 2025-06-18 ENCOUNTER — RESULTS FOLLOW-UP (OUTPATIENT)
Dept: SURGERY | Age: 55
End: 2025-06-18

## 2025-06-23 NOTE — PROGRESS NOTES
DATE __7/16__ PLACE __x__  [address]       TIME ___0730_                                   ____  [address]      ARRIVAL TIME _0600__                                                                             SURGEONS OFFICE TO GIVE ARRIVAL TIME ___                                    IF YOU HAVE NOT RECEIVED A TIME CONTACT YOUR SURGEON                                     THE FOLLOWING THINGS NEED TO BE DONE OR YOUR SURGERY WILL BE CANCELLED    NOTHING TO EAT OR DRINK AFTER MIDNIGHT THE NIGHT BEFORE. YOU MAY TAKE ONLY THE FOLLOWING MEDICATIONS WITH A SIP OF WATER ON THE MORNING OF YOUR SURGERY.  _____________NONE_________________________________________________________________________________YOU MAY BRUSH YOUR TEETH.    2.   A HISTORY/PHYSICAL MUST BE COMPLETED AND DATED WITHIN 30 DAYS OF YOUR SURGERY BY YOUR PCP __                                                                                                                                                                                                 SURGEON _x_                                                                                                                                                                                                 HOSPITALIST __    3.  THE FOLLOWING MUST BE DONE WITHIN 30 DAYS OF SURGERY. LAB __  EKG __ CHEST XRAY __ TO BE DONE BY _will do DOS___  NOT APPICABLE________    4.   IF YOU TAKE A LONG ACTING INSULIN AT NIGHT, CUT YOUR NORMAL DOSE IN HALF, AND TAKE NO DIABETIC MEDCATION IN THE MORNING.    5.   IF YOU TAKE A GLP-1 RECEPTOR (SUCH AS TRULICITY, MOUNJARO, OZEMPIC-WEEKLY), YOU MUST BE OFF x 7 DAYS. IF YOU TAKE A DAILY DOSE SUCH AS RYBELSUS,      YOU MUST STOP 24 HOURS PRIOR TO SURGERY. LAST DOSE________    6.  IF YOU TAKE A SGLT2 INHIBITOR  (SUCH AS FARXIGA, JARDIANCE, INVOKANA OR SYNJARDY), YOU MUST STOP 3 DAYS PRIOR TO SURGERY. LAST DOSE_______    7.  IF YOU TAKE A BLOOD THINNER (SUCH AS PLAVIX, ELIQUIS, XARELTO,

## 2025-06-23 NOTE — PROGRESS NOTES
Preop instructions sent via [employer]. Patient informed.    Instructions given:    Verbal __x__    Voicemail ____

## 2025-07-16 ENCOUNTER — HOSPITAL ENCOUNTER (INPATIENT)
Age: 55
LOS: 6 days | Discharge: HOME | DRG: 331 | End: 2025-07-22
Attending: SURGERY | Admitting: SURGERY
Payer: COMMERCIAL

## 2025-07-16 ENCOUNTER — ANESTHESIA EVENT (OUTPATIENT)
Dept: OPERATING ROOM | Age: 55
End: 2025-07-16
Payer: COMMERCIAL

## 2025-07-16 ENCOUNTER — ANESTHESIA (OUTPATIENT)
Dept: OPERATING ROOM | Age: 55
End: 2025-07-16
Payer: COMMERCIAL

## 2025-07-16 DIAGNOSIS — Z93.3 COLOSTOMY IN PLACE (HCC): ICD-10-CM

## 2025-07-16 PROBLEM — K66.0 INTESTINAL ADHESIONS: Status: ACTIVE | Noted: 2025-07-16

## 2025-07-16 LAB
ABO/RH: NORMAL
ANION GAP SERPL CALCULATED.3IONS-SCNC: 12 MMOL/L (ref 3–16)
ANTIBODY SCREEN: NORMAL
BUN SERPL-MCNC: 12 MG/DL (ref 7–20)
CALCIUM SERPL-MCNC: 10.1 MG/DL (ref 8.3–10.6)
CHLORIDE SERPL-SCNC: 103 MMOL/L (ref 99–110)
CO2 SERPL-SCNC: 23 MMOL/L (ref 21–32)
CREAT SERPL-MCNC: 0.7 MG/DL (ref 0.6–1.1)
DEPRECATED RDW RBC AUTO: 13.2 % (ref 12.4–15.4)
GFR SERPLBLD CREATININE-BSD FMLA CKD-EPI: >90 ML/MIN/{1.73_M2}
GLUCOSE SERPL-MCNC: 119 MG/DL (ref 70–99)
HCT VFR BLD AUTO: 40.3 % (ref 36–48)
HGB BLD-MCNC: 13.9 G/DL (ref 12–16)
MCH RBC QN AUTO: 29.5 PG (ref 26–34)
MCHC RBC AUTO-ENTMCNC: 34.4 G/DL (ref 31–36)
MCV RBC AUTO: 85.8 FL (ref 80–100)
PLATELET # BLD AUTO: 208 K/UL (ref 135–450)
PMV BLD AUTO: 8.2 FL (ref 5–10.5)
POTASSIUM SERPL-SCNC: 3.9 MMOL/L (ref 3.5–5.1)
RBC # BLD AUTO: 4.7 M/UL (ref 4–5.2)
SODIUM SERPL-SCNC: 138 MMOL/L (ref 136–145)
WBC # BLD AUTO: 4.8 K/UL (ref 4–11)

## 2025-07-16 PROCEDURE — 6360000002 HC RX W HCPCS: Performed by: SURGERY

## 2025-07-16 PROCEDURE — 3700000001 HC ADD 15 MINUTES (ANESTHESIA): Performed by: SURGERY

## 2025-07-16 PROCEDURE — 1200000000 HC SEMI PRIVATE

## 2025-07-16 PROCEDURE — 6360000002 HC RX W HCPCS: Performed by: STUDENT IN AN ORGANIZED HEALTH CARE EDUCATION/TRAINING PROGRAM

## 2025-07-16 PROCEDURE — 0DSL0ZZ REPOSITION TRANSVERSE COLON, OPEN APPROACH: ICD-10-PCS | Performed by: SURGERY

## 2025-07-16 PROCEDURE — 97605 NEG PRS WND THER DME<=50SQCM: CPT | Performed by: SURGERY

## 2025-07-16 PROCEDURE — 2580000003 HC RX 258: Performed by: NURSE ANESTHETIST, CERTIFIED REGISTERED

## 2025-07-16 PROCEDURE — 86901 BLOOD TYPING SEROLOGIC RH(D): CPT

## 2025-07-16 PROCEDURE — 94150 VITAL CAPACITY TEST: CPT

## 2025-07-16 PROCEDURE — 7100000001 HC PACU RECOVERY - ADDTL 15 MIN: Performed by: SURGERY

## 2025-07-16 PROCEDURE — 3600000005 HC SURGERY LEVEL 5 BASE: Performed by: SURGERY

## 2025-07-16 PROCEDURE — 80048 BASIC METABOLIC PNL TOTAL CA: CPT

## 2025-07-16 PROCEDURE — 0DQP0ZZ REPAIR RECTUM, OPEN APPROACH: ICD-10-PCS | Performed by: SURGERY

## 2025-07-16 PROCEDURE — 0WQF0ZZ REPAIR ABDOMINAL WALL, OPEN APPROACH: ICD-10-PCS | Performed by: SURGERY

## 2025-07-16 PROCEDURE — 86900 BLOOD TYPING SEROLOGIC ABO: CPT

## 2025-07-16 PROCEDURE — 86850 RBC ANTIBODY SCREEN: CPT

## 2025-07-16 PROCEDURE — 6360000002 HC RX W HCPCS: Performed by: NURSE ANESTHETIST, CERTIFIED REGISTERED

## 2025-07-16 PROCEDURE — 7100000000 HC PACU RECOVERY - FIRST 15 MIN: Performed by: SURGERY

## 2025-07-16 PROCEDURE — 0JQ80ZZ REPAIR ABDOMEN SUBCUTANEOUS TISSUE AND FASCIA, OPEN APPROACH: ICD-10-PCS | Performed by: SURGERY

## 2025-07-16 PROCEDURE — 2500000003 HC RX 250 WO HCPCS: Performed by: SURGERY

## 2025-07-16 PROCEDURE — 0DN80ZZ RELEASE SMALL INTESTINE, OPEN APPROACH: ICD-10-PCS | Performed by: SURGERY

## 2025-07-16 PROCEDURE — 2720000010 HC SURG SUPPLY STERILE: Performed by: SURGERY

## 2025-07-16 PROCEDURE — 2500000003 HC RX 250 WO HCPCS: Performed by: NURSE ANESTHETIST, CERTIFIED REGISTERED

## 2025-07-16 PROCEDURE — 36415 COLL VENOUS BLD VENIPUNCTURE: CPT

## 2025-07-16 PROCEDURE — 3700000000 HC ANESTHESIA ATTENDED CARE: Performed by: SURGERY

## 2025-07-16 PROCEDURE — 0DQE0ZZ REPAIR LARGE INTESTINE, OPEN APPROACH: ICD-10-PCS | Performed by: SURGERY

## 2025-07-16 PROCEDURE — 0DNU0ZZ RELEASE OMENTUM, OPEN APPROACH: ICD-10-PCS | Performed by: SURGERY

## 2025-07-16 PROCEDURE — 2709999900 HC NON-CHARGEABLE SUPPLY: Performed by: SURGERY

## 2025-07-16 PROCEDURE — 88304 TISSUE EXAM BY PATHOLOGIST: CPT

## 2025-07-16 PROCEDURE — 44626 REPAIR BOWEL OPENING: CPT | Performed by: SURGERY

## 2025-07-16 PROCEDURE — 85027 COMPLETE CBC AUTOMATED: CPT

## 2025-07-16 PROCEDURE — 3600000015 HC SURGERY LEVEL 5 ADDTL 15MIN: Performed by: SURGERY

## 2025-07-16 RX ORDER — ENOXAPARIN SODIUM 100 MG/ML
30 INJECTION SUBCUTANEOUS DAILY
Status: DISCONTINUED | OUTPATIENT
Start: 2025-07-17 | End: 2025-07-22 | Stop reason: HOSPADM

## 2025-07-16 RX ORDER — SODIUM CHLORIDE 0.9 % (FLUSH) 0.9 %
5-40 SYRINGE (ML) INJECTION EVERY 12 HOURS SCHEDULED
Status: DISCONTINUED | OUTPATIENT
Start: 2025-07-16 | End: 2025-07-22 | Stop reason: HOSPADM

## 2025-07-16 RX ORDER — MORPHINE SULFATE 2 MG/ML
2 INJECTION, SOLUTION INTRAMUSCULAR; INTRAVENOUS
Status: DISCONTINUED | OUTPATIENT
Start: 2025-07-16 | End: 2025-07-22 | Stop reason: HOSPADM

## 2025-07-16 RX ORDER — PROPOFOL 10 MG/ML
INJECTION, EMULSION INTRAVENOUS
Status: DISCONTINUED | OUTPATIENT
Start: 2025-07-16 | End: 2025-07-16 | Stop reason: SDUPTHER

## 2025-07-16 RX ORDER — GABAPENTIN 100 MG/1
100 CAPSULE ORAL 3 TIMES DAILY
Status: DISCONTINUED | OUTPATIENT
Start: 2025-07-16 | End: 2025-07-16 | Stop reason: ALTCHOICE

## 2025-07-16 RX ORDER — ACETAMINOPHEN 325 MG/1
650 TABLET ORAL
Status: DISCONTINUED | OUTPATIENT
Start: 2025-07-16 | End: 2025-07-16 | Stop reason: HOSPADM

## 2025-07-16 RX ORDER — KETAMINE HYDROCHLORIDE 10 MG/ML
INJECTION, SOLUTION INTRAMUSCULAR; INTRAVENOUS
Status: DISCONTINUED | OUTPATIENT
Start: 2025-07-16 | End: 2025-07-16 | Stop reason: SDUPTHER

## 2025-07-16 RX ORDER — SODIUM CHLORIDE 9 MG/ML
INJECTION, SOLUTION INTRAVENOUS PRN
Status: DISCONTINUED | OUTPATIENT
Start: 2025-07-16 | End: 2025-07-16 | Stop reason: HOSPADM

## 2025-07-16 RX ORDER — IBUPROFEN 400 MG/1
600 TABLET, FILM COATED ORAL ONCE
Status: DISCONTINUED | OUTPATIENT
Start: 2025-07-16 | End: 2025-07-16 | Stop reason: HOSPADM

## 2025-07-16 RX ORDER — LORAZEPAM 2 MG/ML
0.5 INJECTION INTRAMUSCULAR
Status: DISCONTINUED | OUTPATIENT
Start: 2025-07-16 | End: 2025-07-16 | Stop reason: HOSPADM

## 2025-07-16 RX ORDER — ONDANSETRON 2 MG/ML
INJECTION INTRAMUSCULAR; INTRAVENOUS
Status: DISCONTINUED | OUTPATIENT
Start: 2025-07-16 | End: 2025-07-16 | Stop reason: SDUPTHER

## 2025-07-16 RX ORDER — MEPERIDINE HYDROCHLORIDE 25 MG/ML
12.5 INJECTION INTRAMUSCULAR; INTRAVENOUS; SUBCUTANEOUS EVERY 5 MIN PRN
Status: DISCONTINUED | OUTPATIENT
Start: 2025-07-16 | End: 2025-07-16 | Stop reason: HOSPADM

## 2025-07-16 RX ORDER — MIDAZOLAM HYDROCHLORIDE 1 MG/ML
INJECTION, SOLUTION INTRAMUSCULAR; INTRAVENOUS
Status: DISCONTINUED | OUTPATIENT
Start: 2025-07-16 | End: 2025-07-16 | Stop reason: SDUPTHER

## 2025-07-16 RX ORDER — HYDROMORPHONE HYDROCHLORIDE 2 MG/ML
0.5 INJECTION, SOLUTION INTRAMUSCULAR; INTRAVENOUS; SUBCUTANEOUS EVERY 5 MIN PRN
Status: DISCONTINUED | OUTPATIENT
Start: 2025-07-16 | End: 2025-07-16 | Stop reason: HOSPADM

## 2025-07-16 RX ORDER — SODIUM CHLORIDE 0.9 % (FLUSH) 0.9 %
5-40 SYRINGE (ML) INJECTION EVERY 12 HOURS SCHEDULED
Status: DISCONTINUED | OUTPATIENT
Start: 2025-07-16 | End: 2025-07-16 | Stop reason: HOSPADM

## 2025-07-16 RX ORDER — LABETALOL HYDROCHLORIDE 5 MG/ML
10 INJECTION, SOLUTION INTRAVENOUS
Status: DISCONTINUED | OUTPATIENT
Start: 2025-07-16 | End: 2025-07-16 | Stop reason: HOSPADM

## 2025-07-16 RX ORDER — TRAMADOL HYDROCHLORIDE 50 MG/1
50 TABLET ORAL PRN
Status: DISCONTINUED | OUTPATIENT
Start: 2025-07-16 | End: 2025-07-16 | Stop reason: HOSPADM

## 2025-07-16 RX ORDER — ONDANSETRON 2 MG/ML
4 INJECTION INTRAMUSCULAR; INTRAVENOUS
Status: DISCONTINUED | OUTPATIENT
Start: 2025-07-16 | End: 2025-07-16 | Stop reason: HOSPADM

## 2025-07-16 RX ORDER — PROCHLORPERAZINE EDISYLATE 5 MG/ML
5 INJECTION INTRAMUSCULAR; INTRAVENOUS
Status: DISCONTINUED | OUTPATIENT
Start: 2025-07-16 | End: 2025-07-16 | Stop reason: HOSPADM

## 2025-07-16 RX ORDER — HYDRALAZINE HYDROCHLORIDE 20 MG/ML
10 INJECTION INTRAMUSCULAR; INTRAVENOUS
Status: DISCONTINUED | OUTPATIENT
Start: 2025-07-16 | End: 2025-07-16 | Stop reason: HOSPADM

## 2025-07-16 RX ORDER — DEXAMETHASONE SODIUM PHOSPHATE 4 MG/ML
INJECTION, SOLUTION INTRA-ARTICULAR; INTRALESIONAL; INTRAMUSCULAR; INTRAVENOUS; SOFT TISSUE
Status: DISCONTINUED | OUTPATIENT
Start: 2025-07-16 | End: 2025-07-16 | Stop reason: SDUPTHER

## 2025-07-16 RX ORDER — MORPHINE SULFATE 4 MG/ML
4 INJECTION, SOLUTION INTRAMUSCULAR; INTRAVENOUS
Status: DISCONTINUED | OUTPATIENT
Start: 2025-07-16 | End: 2025-07-21

## 2025-07-16 RX ORDER — ACETAMINOPHEN 500 MG
1000 TABLET ORAL EVERY 8 HOURS SCHEDULED
Status: DISPENSED | OUTPATIENT
Start: 2025-07-16 | End: 2025-07-19

## 2025-07-16 RX ORDER — SODIUM CHLORIDE 0.9 % (FLUSH) 0.9 %
5-40 SYRINGE (ML) INJECTION PRN
Status: DISCONTINUED | OUTPATIENT
Start: 2025-07-16 | End: 2025-07-22 | Stop reason: HOSPADM

## 2025-07-16 RX ORDER — HYDROMORPHONE HYDROCHLORIDE 2 MG/ML
INJECTION, SOLUTION INTRAMUSCULAR; INTRAVENOUS; SUBCUTANEOUS
Status: DISCONTINUED | OUTPATIENT
Start: 2025-07-16 | End: 2025-07-16 | Stop reason: SDUPTHER

## 2025-07-16 RX ORDER — METRONIDAZOLE 500 MG/100ML
500 INJECTION, SOLUTION INTRAVENOUS EVERY 8 HOURS
Status: COMPLETED | OUTPATIENT
Start: 2025-07-16 | End: 2025-07-18

## 2025-07-16 RX ORDER — ONDANSETRON 2 MG/ML
4 INJECTION INTRAMUSCULAR; INTRAVENOUS EVERY 4 HOURS PRN
Status: DISCONTINUED | OUTPATIENT
Start: 2025-07-16 | End: 2025-07-22 | Stop reason: HOSPADM

## 2025-07-16 RX ORDER — FENTANYL CITRATE 50 UG/ML
INJECTION, SOLUTION INTRAMUSCULAR; INTRAVENOUS
Status: DISCONTINUED | OUTPATIENT
Start: 2025-07-16 | End: 2025-07-16 | Stop reason: SDUPTHER

## 2025-07-16 RX ORDER — SODIUM CHLORIDE 9 MG/ML
INJECTION, SOLUTION INTRAVENOUS
Status: DISCONTINUED | OUTPATIENT
Start: 2025-07-16 | End: 2025-07-16 | Stop reason: SDUPTHER

## 2025-07-16 RX ORDER — SODIUM CHLORIDE 0.9 % (FLUSH) 0.9 %
5-40 SYRINGE (ML) INJECTION PRN
Status: DISCONTINUED | OUTPATIENT
Start: 2025-07-16 | End: 2025-07-16 | Stop reason: HOSPADM

## 2025-07-16 RX ORDER — BUPIVACAINE HYDROCHLORIDE 5 MG/ML
INJECTION, SOLUTION EPIDURAL; INTRACAUDAL; PERINEURAL
Status: DISCONTINUED | OUTPATIENT
Start: 2025-07-16 | End: 2025-07-16 | Stop reason: ALTCHOICE

## 2025-07-16 RX ORDER — MAGNESIUM SULFATE HEPTAHYDRATE 500 MG/ML
INJECTION, SOLUTION INTRAMUSCULAR; INTRAVENOUS
Status: DISCONTINUED | OUTPATIENT
Start: 2025-07-16 | End: 2025-07-16 | Stop reason: SDUPTHER

## 2025-07-16 RX ORDER — SODIUM CHLORIDE 9 MG/ML
INJECTION, SOLUTION INTRAVENOUS PRN
Status: DISCONTINUED | OUTPATIENT
Start: 2025-07-16 | End: 2025-07-22 | Stop reason: HOSPADM

## 2025-07-16 RX ORDER — GLYCOPYRROLATE 0.2 MG/ML
INJECTION INTRAMUSCULAR; INTRAVENOUS
Status: DISCONTINUED | OUTPATIENT
Start: 2025-07-16 | End: 2025-07-16 | Stop reason: SDUPTHER

## 2025-07-16 RX ORDER — TRAMADOL HYDROCHLORIDE 50 MG/1
100 TABLET ORAL PRN
Status: DISCONTINUED | OUTPATIENT
Start: 2025-07-16 | End: 2025-07-16 | Stop reason: HOSPADM

## 2025-07-16 RX ORDER — MAGNESIUM HYDROXIDE 1200 MG/15ML
LIQUID ORAL CONTINUOUS PRN
Status: COMPLETED | OUTPATIENT
Start: 2025-07-16 | End: 2025-07-16

## 2025-07-16 RX ORDER — LIDOCAINE HYDROCHLORIDE 20 MG/ML
INJECTION, SOLUTION INFILTRATION; PERINEURAL
Status: DISCONTINUED | OUTPATIENT
Start: 2025-07-16 | End: 2025-07-16 | Stop reason: SDUPTHER

## 2025-07-16 RX ORDER — DEXMEDETOMIDINE HYDROCHLORIDE 100 UG/ML
INJECTION, SOLUTION INTRAVENOUS
Status: DISCONTINUED | OUTPATIENT
Start: 2025-07-16 | End: 2025-07-16 | Stop reason: SDUPTHER

## 2025-07-16 RX ORDER — DEXTROSE MONOHYDRATE, SODIUM CHLORIDE, AND POTASSIUM CHLORIDE 50; 1.49; 4.5 G/1000ML; G/1000ML; G/1000ML
INJECTION, SOLUTION INTRAVENOUS CONTINUOUS
Status: DISCONTINUED | OUTPATIENT
Start: 2025-07-16 | End: 2025-07-18

## 2025-07-16 RX ORDER — ROCURONIUM BROMIDE 10 MG/ML
INJECTION, SOLUTION INTRAVENOUS
Status: DISCONTINUED | OUTPATIENT
Start: 2025-07-16 | End: 2025-07-16 | Stop reason: SDUPTHER

## 2025-07-16 RX ORDER — OXYCODONE HYDROCHLORIDE 5 MG/1
5 TABLET ORAL EVERY 4 HOURS PRN
Status: DISCONTINUED | OUTPATIENT
Start: 2025-07-16 | End: 2025-07-22 | Stop reason: HOSPADM

## 2025-07-16 RX ORDER — HYDROMORPHONE HYDROCHLORIDE 2 MG/ML
0.25 INJECTION, SOLUTION INTRAMUSCULAR; INTRAVENOUS; SUBCUTANEOUS EVERY 5 MIN PRN
Status: DISCONTINUED | OUTPATIENT
Start: 2025-07-16 | End: 2025-07-16 | Stop reason: HOSPADM

## 2025-07-16 RX ADMIN — WATER 2000 MG: 1 INJECTION INTRAMUSCULAR; INTRAVENOUS; SUBCUTANEOUS at 15:44

## 2025-07-16 RX ADMIN — HYDROMORPHONE HYDROCHLORIDE 0.2 MG: 2 INJECTION, SOLUTION INTRAMUSCULAR; INTRAVENOUS; SUBCUTANEOUS at 09:05

## 2025-07-16 RX ADMIN — PHENYLEPHRINE HYDROCHLORIDE 100 MCG: 10 INJECTION INTRAVENOUS at 09:46

## 2025-07-16 RX ADMIN — HYDROMORPHONE HYDROCHLORIDE 0.2 MG: 2 INJECTION, SOLUTION INTRAMUSCULAR; INTRAVENOUS; SUBCUTANEOUS at 09:40

## 2025-07-16 RX ADMIN — KETAMINE HYDROCHLORIDE 10 MG: 10 INJECTION, SOLUTION INTRAMUSCULAR; INTRAVENOUS at 08:09

## 2025-07-16 RX ADMIN — PHENYLEPHRINE HYDROCHLORIDE 100 MCG: 10 INJECTION INTRAVENOUS at 10:38

## 2025-07-16 RX ADMIN — SODIUM CHLORIDE: 9 INJECTION, SOLUTION INTRAVENOUS at 09:10

## 2025-07-16 RX ADMIN — MORPHINE SULFATE 2 MG: 2 INJECTION, SOLUTION INTRAMUSCULAR; INTRAVENOUS at 15:44

## 2025-07-16 RX ADMIN — FENTANYL CITRATE 50 MCG: 50 INJECTION, SOLUTION INTRAMUSCULAR; INTRAVENOUS at 08:14

## 2025-07-16 RX ADMIN — DEXMEDETOMIDINE HYDROCHLORIDE 2 MCG: 100 INJECTION, SOLUTION INTRAVENOUS at 08:38

## 2025-07-16 RX ADMIN — PHENYLEPHRINE HYDROCHLORIDE 100 MCG: 10 INJECTION INTRAVENOUS at 08:03

## 2025-07-16 RX ADMIN — SODIUM CHLORIDE: 9 INJECTION, SOLUTION INTRAVENOUS at 07:15

## 2025-07-16 RX ADMIN — DEXMEDETOMIDINE HYDROCHLORIDE 2 MCG: 100 INJECTION, SOLUTION INTRAVENOUS at 08:29

## 2025-07-16 RX ADMIN — HYDROMORPHONE HYDROCHLORIDE 0.2 MG: 2 INJECTION, SOLUTION INTRAMUSCULAR; INTRAVENOUS; SUBCUTANEOUS at 10:16

## 2025-07-16 RX ADMIN — MAGNESIUM SULFATE HEPTAHYDRATE 1 G: 500 INJECTION, SOLUTION INTRAMUSCULAR; INTRAVENOUS at 07:40

## 2025-07-16 RX ADMIN — METRONIDAZOLE 500 MG: 500 INJECTION, SOLUTION INTRAVENOUS at 07:27

## 2025-07-16 RX ADMIN — PHENYLEPHRINE HYDROCHLORIDE 100 MCG: 10 INJECTION INTRAVENOUS at 09:04

## 2025-07-16 RX ADMIN — ROCURONIUM BROMIDE 10 MG: 10 INJECTION, SOLUTION INTRAVENOUS at 10:21

## 2025-07-16 RX ADMIN — ONDANSETRON 4 MG: 2 INJECTION INTRAMUSCULAR; INTRAVENOUS at 07:45

## 2025-07-16 RX ADMIN — PHENYLEPHRINE HYDROCHLORIDE 100 MCG: 10 INJECTION INTRAVENOUS at 07:41

## 2025-07-16 RX ADMIN — DEXMEDETOMIDINE HYDROCHLORIDE 4 MCG: 100 INJECTION, SOLUTION INTRAVENOUS at 08:18

## 2025-07-16 RX ADMIN — FENTANYL CITRATE 50 MCG: 50 INJECTION, SOLUTION INTRAMUSCULAR; INTRAVENOUS at 07:36

## 2025-07-16 RX ADMIN — GLYCOPYRROLATE 0.2 MG: 0.2 INJECTION, SOLUTION INTRAMUSCULAR; INTRAVENOUS at 07:33

## 2025-07-16 RX ADMIN — PHENYLEPHRINE HYDROCHLORIDE 100 MCG: 10 INJECTION INTRAVENOUS at 07:48

## 2025-07-16 RX ADMIN — SUGAMMADEX 200 MG: 100 INJECTION, SOLUTION INTRAVENOUS at 10:53

## 2025-07-16 RX ADMIN — HYDROMORPHONE HYDROCHLORIDE 0.2 MG: 2 INJECTION, SOLUTION INTRAMUSCULAR; INTRAVENOUS; SUBCUTANEOUS at 08:52

## 2025-07-16 RX ADMIN — HYDROMORPHONE HYDROCHLORIDE 0.2 MG: 2 INJECTION, SOLUTION INTRAMUSCULAR; INTRAVENOUS; SUBCUTANEOUS at 09:26

## 2025-07-16 RX ADMIN — PHENYLEPHRINE HYDROCHLORIDE 100 MCG: 10 INJECTION INTRAVENOUS at 08:44

## 2025-07-16 RX ADMIN — ROCURONIUM BROMIDE 10 MG: 10 INJECTION, SOLUTION INTRAVENOUS at 09:56

## 2025-07-16 RX ADMIN — PHENYLEPHRINE HYDROCHLORIDE 100 MCG: 10 INJECTION INTRAVENOUS at 09:20

## 2025-07-16 RX ADMIN — METRONIDAZOLE 500 MG: 500 INJECTION, SOLUTION INTRAVENOUS at 15:52

## 2025-07-16 RX ADMIN — LIDOCAINE HYDROCHLORIDE 100 MG: 20 INJECTION, SOLUTION INFILTRATION; PERINEURAL at 07:36

## 2025-07-16 RX ADMIN — KETAMINE HYDROCHLORIDE 10 MG: 10 INJECTION, SOLUTION INTRAMUSCULAR; INTRAVENOUS at 08:41

## 2025-07-16 RX ADMIN — HYDROMORPHONE HYDROCHLORIDE 0.25 MG: 2 INJECTION, SOLUTION INTRAMUSCULAR; INTRAVENOUS; SUBCUTANEOUS at 12:39

## 2025-07-16 RX ADMIN — DEXAMETHASONE SODIUM PHOSPHATE 8 MG: 4 INJECTION, SOLUTION INTRAMUSCULAR; INTRAVENOUS at 07:43

## 2025-07-16 RX ADMIN — DEXTROSE, SODIUM CHLORIDE, AND POTASSIUM CHLORIDE: 5; .45; .15 INJECTION INTRAVENOUS at 12:49

## 2025-07-16 RX ADMIN — KETAMINE HYDROCHLORIDE 20 MG: 10 INJECTION, SOLUTION INTRAMUSCULAR; INTRAVENOUS at 08:25

## 2025-07-16 RX ADMIN — KETAMINE HYDROCHLORIDE 10 MG: 10 INJECTION, SOLUTION INTRAMUSCULAR; INTRAVENOUS at 08:56

## 2025-07-16 RX ADMIN — PHENYLEPHRINE HYDROCHLORIDE 100 MCG: 10 INJECTION INTRAVENOUS at 10:02

## 2025-07-16 RX ADMIN — HYDROMORPHONE HYDROCHLORIDE 0.5 MG: 2 INJECTION, SOLUTION INTRAMUSCULAR; INTRAVENOUS; SUBCUTANEOUS at 12:27

## 2025-07-16 RX ADMIN — ROCURONIUM BROMIDE 20 MG: 10 INJECTION, SOLUTION INTRAVENOUS at 08:51

## 2025-07-16 RX ADMIN — ROCURONIUM BROMIDE 50 MG: 10 INJECTION, SOLUTION INTRAVENOUS at 07:36

## 2025-07-16 RX ADMIN — PROPOFOL 200 MG: 10 INJECTION, EMULSION INTRAVENOUS at 07:36

## 2025-07-16 RX ADMIN — MIDAZOLAM 2 MG: 1 INJECTION INTRAMUSCULAR; INTRAVENOUS at 07:28

## 2025-07-16 RX ADMIN — PHENYLEPHRINE HYDROCHLORIDE 100 MCG: 10 INJECTION INTRAVENOUS at 10:29

## 2025-07-16 RX ADMIN — WATER 2000 MG: 1 INJECTION INTRAMUSCULAR; INTRAVENOUS; SUBCUTANEOUS at 07:46

## 2025-07-16 ASSESSMENT — PAIN DESCRIPTION - DESCRIPTORS
DESCRIPTORS: ACHING
DESCRIPTORS: DISCOMFORT
DESCRIPTORS: DISCOMFORT
DESCRIPTORS: ACHING
DESCRIPTORS: DISCOMFORT

## 2025-07-16 ASSESSMENT — PAIN - FUNCTIONAL ASSESSMENT
PAIN_FUNCTIONAL_ASSESSMENT: 0-10
PAIN_FUNCTIONAL_ASSESSMENT: PREVENTS OR INTERFERES SOME ACTIVE ACTIVITIES AND ADLS
PAIN_FUNCTIONAL_ASSESSMENT: PREVENTS OR INTERFERES WITH ALL ACTIVE AND SOME PASSIVE ACTIVITIES
PAIN_FUNCTIONAL_ASSESSMENT: PREVENTS OR INTERFERES WITH ALL ACTIVE AND SOME PASSIVE ACTIVITIES

## 2025-07-16 ASSESSMENT — PAIN DESCRIPTION - LOCATION
LOCATION: ABDOMEN

## 2025-07-16 ASSESSMENT — PAIN DESCRIPTION - ORIENTATION
ORIENTATION: MID
ORIENTATION: INNER
ORIENTATION: MID
ORIENTATION: MID
ORIENTATION: INNER;MID

## 2025-07-16 ASSESSMENT — PAIN SCALES - GENERAL
PAINLEVEL_OUTOF10: 6
PAINLEVEL_OUTOF10: 10
PAINLEVEL_OUTOF10: 7
PAINLEVEL_OUTOF10: 8
PAINLEVEL_OUTOF10: 8

## 2025-07-16 ASSESSMENT — PAIN DESCRIPTION - FREQUENCY
FREQUENCY: CONTINUOUS
FREQUENCY: INTERMITTENT
FREQUENCY: INTERMITTENT

## 2025-07-16 ASSESSMENT — PAIN DESCRIPTION - ONSET
ONSET: ON-GOING

## 2025-07-16 ASSESSMENT — PAIN DESCRIPTION - PAIN TYPE
TYPE: SURGICAL PAIN

## 2025-07-16 NOTE — PLAN OF CARE
Problem: Discharge Planning  Goal: Discharge to home or other facility with appropriate resources  Outcome: Progressing  Flowsheets (Taken 7/16/2025 1313)  Discharge to home or other facility with appropriate resources: Identify barriers to discharge with patient and caregiver     Problem: Pain  Goal: Verbalizes/displays adequate comfort level or baseline comfort level  Outcome: Progressing

## 2025-07-16 NOTE — CONSULTS
[facility] GENERAL AND LAPAROSCOPIC SURGERY                       PATIENT NAME: Stephani Reyes     ADMISSION DATE: 7/16/2025  5:51 AM      TODAY'S DATE: 7/16/2025      HISTORY OF PRESENT ILLNESS:              The patient is a 54 y.o. female who presents with a colostomy in place.    Past Medical History:        Diagnosis Date    Calculus of gallbladder without cholecystitis without obstruction 10/02/2023    Chronic chest pain 01/24/2024    Due to chronic pleural disease.  Was referred to pain clinic.    Diverticulosis of colon 05/05/2023    Essential hypertension 02/25/2021    Hyperlipidemia     Ovarian cancer (HCC) 06/17/2024    Endometrioid ovarian cancer stage IIa    Pelvic mass 09/04/2024    Perforated sigmoid colon (HCC) 10/31/2024    Positive QuantiFERON-TB Gold test 09/13/2023    Septicemia (HCC) 10/22/2024    Uterine leiomyoma 04/27/2022       Past Surgical History:        Procedure Laterality Date    ABDOMEN SURGERY N/A 11/6/2024    SECONDARY ABDOMINAL WALL WOUND CLOSURE performed by Iam Nolasco MD at [facility] OR    CHOLECYSTECTOMY, LAPAROSCOPIC N/A 06/17/2024    LAPAROSCOPIC CHOLECYSTECTOMY WITH CHOLANGIOGRAM performed by Iam Nolasco MD at [facility] OR    COLONOSCOPY  05/05/2023    With polypectomy performed by Dr. Ren Yee    HYSTERECTOMY, TOTAL ABDOMINAL (CERVIX REMOVED)  07/22/2024    DIAGNOSTIC LAPAROSCOPY, ROBOT TOTAL HYSTERECTOMY, BILATERAL SALPINGO-OOPHORECTOMY, HAND ASSISTED EXTRACTION by Dr. Courtney Ogden    LAPAROTOMY N/A 10/31/2024    EXPLORATORY LAPAROTOMY performed by Iam Nolasco MD at [facility] OR    LUNG SURGERY  08/21/2023    s/p Robotic evacuation of pleural effusion, pleural biopsy x2 ,partial pleurectomy , mechanical pleurodesis,talc pleurodesis    PORT SURGERY Left 09/16/2024    POWER PORT-A-CATHETER PLACEMENT WITH C-ARM performed by Iam Nolasco MD at [facility] OR    SMALL INTESTINE SURGERY N/A 10/31/2024    COLON RESECTION,

## 2025-07-16 NOTE — PROGRESS NOTES
Pt arrived from OR to PACU, oral airway in place. VSS, O2 sats 100% on 8 L simple mask. Dressing to abdomen dry and intact, wound vac in place at 75 mmHg, no output at this time. Will monitor.

## 2025-07-16 NOTE — ANESTHESIA POSTPROCEDURE EVALUATION
Department of Anesthesiology  Postprocedure Note    Patient: Stephani Reyes  MRN: [id number]  YOB: 1970  Date of evaluation: 7/16/2025    Procedure Summary       Date: 07/16/25 Room / Location: [address]    Anesthesia Start: 0730 Anesthesia Stop: 1120    Procedures:       COLOSTOMY CLOSURE WITH COLORECTAL ANASTOMOSIS (Abdomen)      FLEXIBLE SIGMOIDOSCOPY Diagnosis:       Colostomy in place (HCC)      (Colostomy in place (HCC) [Z93.3])    Surgeons: Iam Nolasco MD Responsible Provider: Gianluca Castillo MD    Anesthesia Type: general ASA Status: 3            Anesthesia Type: No value filed.    Katarina Phase I: Katarina Score: 9    Katarina Phase II:      Anesthesia Post Evaluation    Level of consciousness: awake  Airway patency: patent    No notable events documented.

## 2025-07-16 NOTE — ANESTHESIA PRE PROCEDURE
Department of Anesthesiology  Preprocedure Note       Name:  Stephani Reyes   Age:  54 y.o.  :  1970                                          MRN:  [id number]         Date:  2025      Surgeon: Surgeon(s):  Iam Nolasco MD    Procedure: Procedure(s):  COLOSTOMY CLOSURE WITH COLORECTAL ANASTOMOSIS  FLEXIBLE SIGMOIDOSCOPY    Medications prior to admission:   Prior to Admission medications    Medication Sig Start Date End Date Taking? Authorizing Provider   gabapentin (NEURONTIN) 100 MG capsule Take 1 capsule by mouth 3 times daily for 30 days. 11/10/24 12/10/24  Bessy Monroe MD   ferrous sulfate (IRON 325) 325 (65 Fe) MG tablet Take 1 tablet by mouth in the morning and 1 tablet in the evening.  Patient not taking: Reported on 2025 11/10/24   Bessy Monroe MD       Current medications:    No current facility-administered medications for this encounter.       Allergies:    Allergies   Allergen Reactions   • Crab Extract Itching   • Shellfish Allergy Itching   • Shrimp Extract Itching       Problem List:    Patient Active Problem List   Diagnosis Code   • History of hepatitis B Z86.19   • Mixed hyperlipidemia E78.2   • Hepatitis B core antibody positive R76.8   • Positive QuantiFERON-TB Gold test R76.12   • Prediabetes R73.03   • Ovarian cancer (HCC) C56.9   • Endometrial cancer (HCC) C54.1   • Intra-abdominal abscess (HCC) K65.1   • On antineoplastic chemotherapy Z79.69   • Port-A-Cath in place Z95.828   • Elevated CA-125 R97.1   • Carcinoembryonic antigen (CEA) elevation R97.0   • Immunocompromised D84.9   • Moderate malnutrition E44.0   • Colostomy in place (HCC) Z93.3       Past Medical History:        Diagnosis Date   • Calculus of gallbladder without cholecystitis without obstruction 10/02/2023   • Chronic chest pain 2024    Due to chronic pleural disease.  Was referred to pain clinic.   • Diverticulosis of colon 2023   • Essential hypertension 2021   •

## 2025-07-16 NOTE — BRIEF OP NOTE
Brief Postoperative Note      Patient: Stephani Reyes  YOB: 1970  MRN: [id number]    Date of Procedure: 7/16/2025    Pre-Op Diagnosis Codes:      * Colostomy in place (HCC) [Z93.3]    Post-Op Diagnosis: Same       Procedure(s):  COLOSTOMY CLOSURE WITH COLORECTAL ANASTOMOSIS  FLEXIBLE SIGMOIDOSCOPY    Surgeon(s):  Iam Nolasco MD    Assistant:  Surgical Assistant: Meron Madden    Anesthesia: General    Estimated Blood Loss (mL): Minimal    Complications: None    Specimens:   ID Type Source Tests Collected by Time Destination   A : OLD COLOSTOMY, ANASTOMOTIC RINGS Stool Colostomy SURGICAL PATHOLOGY Iam Nolasco MD 7/16/2025 1008        Implants:  * No implants in log *      Drains:   Negative Pressure Wound Therapy Abdomen Medial (Active)       Urinary Catheter 07/16/25 Liriano (Active)       [REMOVED] Colostomy LLQ (Removed)       Findings:  Present At Time Of Surgery (PATOS) (choose all levels that have infection present):  No infection present  Other Findings: Extensive CORA, colorectal anastomosis completed  SQ wound VAC utilized in incisions    Electronically signed by Iam Nolasco MD on 7/16/2025 at 11:15 AM

## 2025-07-16 NOTE — PROGRESS NOTES
Pt VSS. Pt seen by anesthesia. Pt awakens easily to voice, states pain has decreased at this time. Surgical site to abdomen x2 wound vac in place and running. Phase I care complete, transferring to T.

## 2025-07-17 PROCEDURE — 6360000002 HC RX W HCPCS: Performed by: SURGERY

## 2025-07-17 PROCEDURE — 6370000000 HC RX 637 (ALT 250 FOR IP): Performed by: SURGERY

## 2025-07-17 PROCEDURE — 99024 POSTOP FOLLOW-UP VISIT: CPT | Performed by: SURGERY

## 2025-07-17 PROCEDURE — 1200000000 HC SEMI PRIVATE

## 2025-07-17 PROCEDURE — 2500000003 HC RX 250 WO HCPCS: Performed by: SURGERY

## 2025-07-17 RX ADMIN — WATER 2000 MG: 1 INJECTION INTRAMUSCULAR; INTRAVENOUS; SUBCUTANEOUS at 09:18

## 2025-07-17 RX ADMIN — WATER 2000 MG: 1 INJECTION INTRAMUSCULAR; INTRAVENOUS; SUBCUTANEOUS at 15:04

## 2025-07-17 RX ADMIN — ACETAMINOPHEN 1000 MG: 500 TABLET ORAL at 15:13

## 2025-07-17 RX ADMIN — METRONIDAZOLE 500 MG: 500 INJECTION, SOLUTION INTRAVENOUS at 15:10

## 2025-07-17 RX ADMIN — METRONIDAZOLE 500 MG: 500 INJECTION, SOLUTION INTRAVENOUS at 00:06

## 2025-07-17 RX ADMIN — WATER 2000 MG: 1 INJECTION INTRAMUSCULAR; INTRAVENOUS; SUBCUTANEOUS at 00:02

## 2025-07-17 RX ADMIN — MORPHINE SULFATE 4 MG: 4 INJECTION INTRAVENOUS at 09:27

## 2025-07-17 RX ADMIN — ACETAMINOPHEN 1000 MG: 500 TABLET ORAL at 21:56

## 2025-07-17 RX ADMIN — MORPHINE SULFATE 4 MG: 4 INJECTION INTRAVENOUS at 00:00

## 2025-07-17 RX ADMIN — MORPHINE SULFATE 4 MG: 4 INJECTION INTRAVENOUS at 04:51

## 2025-07-17 RX ADMIN — METRONIDAZOLE 500 MG: 500 INJECTION, SOLUTION INTRAVENOUS at 09:18

## 2025-07-17 RX ADMIN — MORPHINE SULFATE 4 MG: 4 INJECTION INTRAVENOUS at 15:01

## 2025-07-17 RX ADMIN — ENOXAPARIN SODIUM 30 MG: 100 INJECTION SUBCUTANEOUS at 09:10

## 2025-07-17 RX ADMIN — DEXTROSE, SODIUM CHLORIDE, AND POTASSIUM CHLORIDE: 5; .45; .15 INJECTION INTRAVENOUS at 15:11

## 2025-07-17 ASSESSMENT — PAIN DESCRIPTION - LOCATION
LOCATION: ABDOMEN

## 2025-07-17 ASSESSMENT — PAIN SCALES - GENERAL
PAINLEVEL_OUTOF10: 8
PAINLEVEL_OUTOF10: 8
PAINLEVEL_OUTOF10: 3
PAINLEVEL_OUTOF10: 9
PAINLEVEL_OUTOF10: 5
PAINLEVEL_OUTOF10: 8
PAINLEVEL_OUTOF10: 5
PAINLEVEL_OUTOF10: 9
PAINLEVEL_OUTOF10: 3

## 2025-07-17 ASSESSMENT — PAIN DESCRIPTION - DESCRIPTORS
DESCRIPTORS: ACHING;CRAMPING
DESCRIPTORS: ACHING
DESCRIPTORS: ACHING

## 2025-07-17 ASSESSMENT — PAIN DESCRIPTION - ORIENTATION
ORIENTATION: MID

## 2025-07-17 NOTE — PLAN OF CARE
Problem: Discharge Planning  Goal: Discharge to home or other facility with appropriate resources  7/16/2025 1841 by Brittney Servin RN  Outcome: Progressing  Discharge to home or other facility with appropriate resources: Identify barriers to discharge with patient and caregiver     Problem: Pain  Goal: Verbalizes/displays adequate comfort level or baseline comfort level  Outcome: Progressing

## 2025-07-17 NOTE — FLOWSHEET NOTE
Liriano Catheter Removal       07/17/25 1406   Urinary Catheter 07/16/25 Liriano   Placement Date/Time: 07/16/25 0750   Present on Admission/Arrival: No  Inserted by: ALMA MORTON RN  Insertion attempts: 1  Catheter Type: Liriano  Catheter Size: (c) 16 FR  Catheter Balloon Size: 10 mL  Insertion Procedure Practices: Order written;Pre-...   Catheter Indications Perioperative use for selected surgical procedures  (Removed Now)   Site Assessment No urethral drainage   Urine Color Yellow   Urine Appearance Clear   Collection Container Standard   Securement Method Securing device (Describe)   Catheter Best Practices  Drainage tube clipped to bed;Catheter secured to thigh;Tamper seal intact;Bag below bladder;Bag not on floor;Lack of dependent loop in tubing;Drainage bag less than half full   Status Draining   Output (mL) 650 mL

## 2025-07-17 NOTE — PROGRESS NOTES
[facility]    Surgery Progress Note           POD # 1    PATIENT NAME: Stephani Reyes     TODAY'S DATE: 7/17/2025    SUBJECTIVE:    Pt failry comfortable, in bed  No N/V, no SOB or CP.     OBJECTIVE:   VITALS:  BP (!) 149/76   Pulse 65   Temp 98.4 °F (36.9 °C) (Oral)   Resp 16   Ht 1.499 m (4' 11\")   Wt 50.3 kg (111 lb)   LMP 04/17/2013 (Approximate) Comment: No period in almost 4 years.  SpO2 99%   BMI 22.42 kg/m²     INTAKE/OUTPUT:    I/O last 3 completed shifts:  In: 1800 [I.V.:1700; IV Piggyback:100]  Out: 1370 [Urine:1270; Blood:100]  No intake/output data recorded.              CONSTITUTIONAL:  awake and alert  LUNGS:  clear to auscultation  ABDOMEN:   hypoactive bowel sounds, firm, non-distended, tenderness noted around incision   INCISION: VAC in place, good seal    Data:  CBC:   Recent Labs     07/16/25  0620   WBC 4.8   HGB 13.9   HCT 40.3        BMP:    Recent Labs     07/16/25  0620      K 3.9      CO2 23   BUN 12   CREATININE 0.7   GLUCOSE 119*     Hepatic: No results for input(s): \"AST\", \"ALT\", \"BILITOT\", \"ALKPHOS\" in the last 72 hours.    Invalid input(s): \"ALB\"  Mag:    No results for input(s): \"MG\" in the last 72 hours.   Phos:   No results for input(s): \"PHOS\" in the last 72 hours.   INR: No results for input(s): \"INR\" in the last 72 hours.    Radiology Review:  None    ASSESSMENT AND PLAN:  54 y.o. female status post colostomy closure with colorectal anastomosis  Stable overall  Remove stanton, continue IS, IVF, OOB, PT, OT  Change VAC tomorrow  Periop antibiotics ordered       Iam Nolasco MD

## 2025-07-17 NOTE — PROGRESS NOTES
2005: shift assessment done, VSS, tolerable amount of pain, will call if she needs pain medicine later, incision CDI and connected to wound vac, call light within reach, plan of care ongoing. The care plan and education has been reviewed and mutually agreed upon with the patient.

## 2025-07-18 PROCEDURE — 6360000002 HC RX W HCPCS: Performed by: SURGERY

## 2025-07-18 PROCEDURE — 97116 GAIT TRAINING THERAPY: CPT

## 2025-07-18 PROCEDURE — 97165 OT EVAL LOW COMPLEX 30 MIN: CPT

## 2025-07-18 PROCEDURE — 97535 SELF CARE MNGMENT TRAINING: CPT

## 2025-07-18 PROCEDURE — 97605 NEG PRS WND THER DME<=50SQCM: CPT | Performed by: SURGERY

## 2025-07-18 PROCEDURE — 1200000000 HC SEMI PRIVATE

## 2025-07-18 PROCEDURE — 2500000003 HC RX 250 WO HCPCS: Performed by: SURGERY

## 2025-07-18 PROCEDURE — 97161 PT EVAL LOW COMPLEX 20 MIN: CPT

## 2025-07-18 PROCEDURE — 6370000000 HC RX 637 (ALT 250 FOR IP): Performed by: SURGERY

## 2025-07-18 RX ORDER — DEXTROSE MONOHYDRATE, SODIUM CHLORIDE, AND POTASSIUM CHLORIDE 50; 1.49; 4.5 G/1000ML; G/1000ML; G/1000ML
INJECTION, SOLUTION INTRAVENOUS CONTINUOUS
Status: DISCONTINUED | OUTPATIENT
Start: 2025-07-18 | End: 2025-07-22 | Stop reason: HOSPADM

## 2025-07-18 RX ADMIN — POTASSIUM CHLORIDE: 2 INJECTION, SOLUTION, CONCENTRATE INTRAVENOUS at 18:28

## 2025-07-18 RX ADMIN — ACETAMINOPHEN 1000 MG: 500 TABLET ORAL at 07:03

## 2025-07-18 RX ADMIN — WATER 2000 MG: 1 INJECTION INTRAMUSCULAR; INTRAVENOUS; SUBCUTANEOUS at 00:20

## 2025-07-18 RX ADMIN — METRONIDAZOLE 500 MG: 500 INJECTION, SOLUTION INTRAVENOUS at 00:23

## 2025-07-18 RX ADMIN — ACETAMINOPHEN 1000 MG: 500 TABLET ORAL at 21:21

## 2025-07-18 RX ADMIN — MORPHINE SULFATE 4 MG: 4 INJECTION INTRAVENOUS at 21:25

## 2025-07-18 RX ADMIN — ACETAMINOPHEN 1000 MG: 500 TABLET ORAL at 15:20

## 2025-07-18 RX ADMIN — Medication 10 ML: at 08:05

## 2025-07-18 RX ADMIN — MORPHINE SULFATE 4 MG: 4 INJECTION INTRAVENOUS at 08:00

## 2025-07-18 RX ADMIN — ENOXAPARIN SODIUM 30 MG: 100 INJECTION SUBCUTANEOUS at 08:01

## 2025-07-18 ASSESSMENT — PAIN SCALES - GENERAL
PAINLEVEL_OUTOF10: 8
PAINLEVEL_OUTOF10: 5
PAINLEVEL_OUTOF10: 8
PAINLEVEL_OUTOF10: 3
PAINLEVEL_OUTOF10: 8
PAINLEVEL_OUTOF10: 5
PAINLEVEL_OUTOF10: 8

## 2025-07-18 ASSESSMENT — PAIN DESCRIPTION - PAIN TYPE: TYPE: SURGICAL PAIN

## 2025-07-18 ASSESSMENT — PAIN DESCRIPTION - DESCRIPTORS
DESCRIPTORS: ACHING;CRAMPING;DISCOMFORT
DESCRIPTORS: ACHING;DISCOMFORT
DESCRIPTORS: ACHING;CRAMPING;DISCOMFORT
DESCRIPTORS: ACHING;DISCOMFORT
DESCRIPTORS: ACHING

## 2025-07-18 ASSESSMENT — PAIN - FUNCTIONAL ASSESSMENT: PAIN_FUNCTIONAL_ASSESSMENT: PREVENTS OR INTERFERES SOME ACTIVE ACTIVITIES AND ADLS

## 2025-07-18 ASSESSMENT — PAIN DESCRIPTION - ORIENTATION
ORIENTATION: MID

## 2025-07-18 ASSESSMENT — PAIN DESCRIPTION - LOCATION
LOCATION: ABDOMEN

## 2025-07-18 NOTE — PROGRESS NOTES
[facility] - Inpatient Rehabilitation Department   Phone: [phone]    Physical Therapy    [x] Initial Evaluation            [] Daily Treatment Note         [] Discharge Summary      Patient: Stephani Reyes   : 1970   MRN: [id number]   Date of Service:  2025  Admitting Diagnosis: Colostomy in place (HCC)  Current Admission Summary: COLOSTOMY CLOSURE WITH COLORECTAL ANASTOMOSIS, FLEXIBLE SIGMOIDOSCOPY on 25  Past Medical History:  has a past medical history of Calculus of gallbladder without cholecystitis without obstruction, Chronic chest pain, Diverticulosis of colon, Essential hypertension, Hyperlipidemia, Ovarian cancer (HCC), Pelvic mass, Perforated sigmoid colon (HCC), Positive QuantiFERON-TB Gold test, Septicemia (HCC), and Uterine leiomyoma.  Past Surgical History:  has a past surgical history that includes Colonoscopy (2023); Lung surgery (2023); Cholecystectomy, laparoscopic (N/A, 2024); Umbilical hernia repair (N/A, 2024); Port Surgery (Left, 2024); Hysterectomy, total abdominal (2024); laparotomy (N/A, 10/31/2024); Small intestine surgery (N/A, 10/31/2024); Small intestine surgery (N/A, 10/31/2024); Abdomen surgery (N/A, 2024); Small intestine surgery (N/A, 2025); and proctosigmoidoscopy (N/A, 2025).  Discharge Recommendations: Stephani Reyes scored a  on the AM-PAC short mobility form.  At this time, no further PT is recommended upon discharge due to patient at independent level.  Recommend patient returns to prior setting with prior services.    DME Required For Discharge: No new DME required  Precautions/Restrictions: no restrictions  Weight Bearing Restrictions: no restrictions  [] Right Upper Extremity  [] Left Upper Extremity [] Right Lower Extremity  [] Left Lower Extremity     Required Braces/Orthotics: no braces required   [] Right  [] Left  Positional Restrictions: no positional

## 2025-07-18 NOTE — PLAN OF CARE
Problem: Discharge Planning  Goal: Discharge to home or other facility with appropriate resources  7/18/2025 1424 by Cadence Mccabe RN  Outcome: Progressing  7/18/2025 0128 by Renato Randall, RN  Outcome: Progressing     Problem: Pain  Goal: Verbalizes/displays adequate comfort level or baseline comfort level  7/18/2025 1424 by Cadence Mccabe RN  Outcome: Progressing  7/18/2025 0128 by Renato Randall, RN  Outcome: Progressing

## 2025-07-18 NOTE — PLAN OF CARE
Problem: Discharge Planning  Goal: Discharge to home or other facility with appropriate resources  7/18/2025 0128 by Renato Randall, RN  Outcome: Progressing     Problem: Pain  Goal: Verbalizes/displays adequate comfort level or baseline comfort level  7/18/2025 0128 by Renato Randall, RN  Outcome: Progressing

## 2025-07-18 NOTE — PROGRESS NOTES
2057: shift assessment done, VSS, denies any pain at the moment, ambulated to the bathroom, tolerated well, call light within reach, plan of care ongoing. The care plan and education has been reviewed and mutually agreed upon with the patient.

## 2025-07-18 NOTE — PROGRESS NOTES
[facility] - Inpatient Rehabilitation Department   Phone: [phone]    Occupational Therapy    [x] Initial Evaluation            [] Daily Treatment Note         [] Discharge Summary      Patient: Stephani Reyes   : 1970   MRN: [id number]   Date of Service:  2025    Admitting Diagnosis:  Colostomy in place (HCC)  Current Admission Summary: Pt in on 25 for COLOSTOMY CLOSURE WITH COLORECTAL ANASTOMOSIS  FLEXIBLE SIGMOIDOSCOPY  Past Medical History:  has a past medical history of Calculus of gallbladder without cholecystitis without obstruction, Chronic chest pain, Diverticulosis of colon, Essential hypertension, Hyperlipidemia, Ovarian cancer (HCC), Pelvic mass, Perforated sigmoid colon (HCC), Positive QuantiFERON-TB Gold test, Septicemia (HCC), and Uterine leiomyoma.  Past Surgical History:  has a past surgical history that includes Colonoscopy (2023); Lung surgery (2023); Cholecystectomy, laparoscopic (N/A, 2024); Umbilical hernia repair (N/A, 2024); Port Surgery (Left, 2024); Hysterectomy, total abdominal (2024); laparotomy (N/A, 10/31/2024); Small intestine surgery (N/A, 10/31/2024); Small intestine surgery (N/A, 10/31/2024); Abdomen surgery (N/A, 2024); Small intestine surgery (N/A, 2025); and proctosigmoidoscopy (N/A, 2025).    Discharge Recommendations: Stephani Reyes scored a  on the AM-PAC ADL Inpatient form.  At this time, no further OT is recommended upon discharge due to anticipate no OT needs.  Recommend patient returns to prior setting with prior services.       DME Required For Discharge: patient has all required DME for discharge    Precautions/Restrictions: no restrictions  Weight Bearing Restrictions: no restrictions  [] Right Upper Extremity  [] Left Upper Extremity [] Right Lower Extremity  [] Left Lower Extremity     Required Braces/Orthotics: no braces required   [] Right  [] Left  Positional  Patient takes Trulicity. Last dose 11/13. Patient instructed that they can have solid foodthe day before surgery and to stop at midnight. Patient may have clear liquids and stop 6 hours prior to arrival, sips of water with medications is ok. Patient verbalizes understanding and has no questions.   assistance  Lower Extremity Bathing: stand by assistance   Bathing Comments: sponge bathes in stance at sink  Upper Extremity Dressing: stand by assistance  Lower Extremity Dressing: contact guard assistance  Dressing Comments: increased time for don/doff gown in stance at sink. CGA as pt dons and doffs brief in stance at sink.  Toileting: stand by assistance.    Toileting Equipment: none  Toileting Comments: up/down brief over hips in stance over toilet, pt voids urine, completes anterior holli care in stance.  Instrumental Activities of Daily Living  No IADL completed on this date.    Functional Mobility  Bed Mobility:  Supine to Sit: minimal assistance  Scooting: stand by assistance  Comments: HOB elevated. Pt holds onto PT arms to pull herself up, A given for RLE to move off of bed during transfer to EOB  Transfers:  Sit to stand transfer:stand by assistance  Stand to sit transfer: stand by assistance  Toilet transfer: stand by assistance  Toilet transfer equipment: standard toilet, grab bars  Comments: Good eccentric control on all transfers  Functional Mobility  Functional Mobility Activity: to/from bathroom, to/from therapy room  Device Use: no device  Required Assistance: stand by assistance  Comment: Decreased step length , slowed gait  Balance:  Static Sitting Balance: good: independent with functional balance in unsupported position  Dynamic Sitting Balance: fair (+): maintains balance at SBA/supervision without use of UE support  Static Standing Balance: fair (-): maintains balance at SBA with use of UE support  Dynamic Standing Balance: fair (-): maintains balance at CGA with use of UE support  Comments: Stands ~8+ minutes for ADL tasks. Pt uses sink for unilateral supports.    Other Therapeutic Interventions  Pt CGA for up/down stairs-See PT note for further comments.    Functional Outcomes  AM-PAC Inpatient Daily Activity Raw Score: 21                                    Cognition  WFL  Orientation:

## 2025-07-18 NOTE — PROGRESS NOTES
[facility]    Surgery Progress Note           POD # 2    PATIENT NAME: Stephani Reyes     TODAY'S DATE: 7/18/2025    SUBJECTIVE:    Pt comfortable, in bed  No N/V, no SOB or CP  Had flatus and BM     OBJECTIVE:   VITALS:  /79   Pulse 82   Temp 98.3 °F (36.8 °C) (Oral)   Resp 16   Ht 1.499 m (4' 11\")   Wt 50.3 kg (111 lb)   LMP 04/17/2013 (Approximate) Comment: No period in almost 4 years.  SpO2 99%   BMI 22.42 kg/m²     INTAKE/OUTPUT:    I/O last 3 completed shifts:  In: 708 [I.V.:312; IV Piggyback:396]  Out: 2825 [Urine:2825]  I/O this shift:  In: 1515 [I.V.:1415; IV Piggyback:100]  Out: -               CONSTITUTIONAL:  awake and alert  LUNGS:  clear to auscultation  ABDOMEN:   hypoactive bowel sounds, softer, non-distended, tenderness noted around incision   INCISION: VAC in place, good seal - removed, incision is healthy and clean    Data:  CBC:   Recent Labs     07/16/25  0620   WBC 4.8   HGB 13.9   HCT 40.3        BMP:    Recent Labs     07/16/25  0620      K 3.9      CO2 23   BUN 12   CREATININE 0.7   GLUCOSE 119*     Hepatic: No results for input(s): \"AST\", \"ALT\", \"BILITOT\", \"ALKPHOS\" in the last 72 hours.    Invalid input(s): \"ALB\"  Mag:    No results for input(s): \"MG\" in the last 72 hours.   Phos:   No results for input(s): \"PHOS\" in the last 72 hours.   INR: No results for input(s): \"INR\" in the last 72 hours.    Radiology Review:  None    ASSESSMENT AND PLAN:  54 y.o. female status post colostomy closure with colorectal anastomosis  Stable overall  Continue IS, IVF, OOB, PT, OT  New VAC placed, with bridge: 6 X 2 cm and 18 X 2 cm, black foam, good seal  Change Sunday, OR closure Monday  Clears po       Iam Nolasco MD

## 2025-07-19 PROCEDURE — 2500000003 HC RX 250 WO HCPCS: Performed by: SURGERY

## 2025-07-19 PROCEDURE — 6370000000 HC RX 637 (ALT 250 FOR IP): Performed by: SURGERY

## 2025-07-19 PROCEDURE — 6360000002 HC RX W HCPCS: Performed by: SURGERY

## 2025-07-19 PROCEDURE — 99024 POSTOP FOLLOW-UP VISIT: CPT | Performed by: SURGERY

## 2025-07-19 PROCEDURE — 1200000000 HC SEMI PRIVATE

## 2025-07-19 RX ORDER — LEVOFLOXACIN 5 MG/ML
500 INJECTION, SOLUTION INTRAVENOUS EVERY 24 HOURS
Status: COMPLETED | OUTPATIENT
Start: 2025-07-19 | End: 2025-07-20

## 2025-07-19 RX ADMIN — LEVOFLOXACIN 500 MG: 5 INJECTION, SOLUTION INTRAVENOUS at 13:25

## 2025-07-19 RX ADMIN — ENOXAPARIN SODIUM 30 MG: 100 INJECTION SUBCUTANEOUS at 08:52

## 2025-07-19 RX ADMIN — MORPHINE SULFATE 2 MG: 2 INJECTION, SOLUTION INTRAMUSCULAR; INTRAVENOUS at 23:53

## 2025-07-19 RX ADMIN — ACETAMINOPHEN 1000 MG: 500 TABLET ORAL at 06:19

## 2025-07-19 RX ADMIN — Medication 10 ML: at 10:10

## 2025-07-19 RX ADMIN — MORPHINE SULFATE 4 MG: 4 INJECTION INTRAVENOUS at 19:30

## 2025-07-19 RX ADMIN — MORPHINE SULFATE 4 MG: 4 INJECTION INTRAVENOUS at 09:40

## 2025-07-19 ASSESSMENT — PAIN DESCRIPTION - ORIENTATION
ORIENTATION: MID

## 2025-07-19 ASSESSMENT — PAIN SCALES - GENERAL
PAINLEVEL_OUTOF10: 4
PAINLEVEL_OUTOF10: 0
PAINLEVEL_OUTOF10: 4
PAINLEVEL_OUTOF10: 7
PAINLEVEL_OUTOF10: 3
PAINLEVEL_OUTOF10: 9
PAINLEVEL_OUTOF10: 3

## 2025-07-19 ASSESSMENT — PAIN - FUNCTIONAL ASSESSMENT: PAIN_FUNCTIONAL_ASSESSMENT: ACTIVITIES ARE NOT PREVENTED

## 2025-07-19 ASSESSMENT — PAIN DESCRIPTION - PAIN TYPE: TYPE: SURGICAL PAIN

## 2025-07-19 ASSESSMENT — PAIN DESCRIPTION - DESCRIPTORS
DESCRIPTORS: ACHING

## 2025-07-19 ASSESSMENT — PAIN DESCRIPTION - LOCATION
LOCATION: ABDOMEN

## 2025-07-19 ASSESSMENT — PAIN DESCRIPTION - ONSET: ONSET: ON-GOING

## 2025-07-19 ASSESSMENT — PAIN DESCRIPTION - FREQUENCY: FREQUENCY: INTERMITTENT

## 2025-07-19 NOTE — PLAN OF CARE
Problem: Discharge Planning  Goal: Discharge to home or other facility with appropriate resources  7/19/2025 0859 by Skyla Britt, RN  Outcome: Progressing  7/19/2025 0144 by Giovana Brooks, RN  Outcome: Progressing     Problem: Pain  Goal: Verbalizes/displays adequate comfort level or baseline comfort level  7/19/2025 0859 by Skyla Britt RN  Outcome: Progressing  7/19/2025 0144 by Giovana Brooks, RN  Outcome: Progressing

## 2025-07-19 NOTE — PLAN OF CARE
Problem: Discharge Planning  Goal: Discharge to home or other facility with appropriate resources  7/19/2025 0144 by Giovana Brooks RN  Outcome: Progressing  7/18/2025 1424 by Cadence Mccabe RN  Outcome: Progressing     Problem: Pain  Goal: Verbalizes/displays adequate comfort level or baseline comfort level  7/19/2025 0144 by Giovana Brooks, RN  Outcome: Progressing  7/18/2025 1424 by Cadence Mccabe RN  Outcome: Progressing

## 2025-07-19 NOTE — PROGRESS NOTES
[facility]    Surgery Progress Note           POD # 3    PATIENT NAME: Stephani Reyes     TODAY'S DATE: 7/19/2025    SUBJECTIVE:    Pt comfortable, in bed  No N/V, no SOB or CP  Had flatus and BM     OBJECTIVE:   VITALS:  BP (!) 143/90   Pulse 72   Temp 98.1 °F (36.7 °C) (Oral)   Resp 16   Ht 1.499 m (4' 11\")   Wt 50.3 kg (111 lb)   LMP 04/17/2013 (Approximate) Comment: No period in almost 4 years.  SpO2 98%   BMI 22.42 kg/m²     INTAKE/OUTPUT:    I/O last 3 completed shifts:  In: 1515 [I.V.:1415; IV Piggyback:100]  Out: 900 [Urine:900]  No intake/output data recorded.              CONSTITUTIONAL:  awake and alert  LUNGS:  clear to auscultation  ABDOMEN:   hypoactive bowel sounds, softer, non-distended, mild tenderness noted around incision   INCISION: VAC in place, good seal present    Data:  CBC:   No results for input(s): \"WBC\", \"HGB\", \"HCT\", \"PLT\" in the last 72 hours.    BMP:    No results for input(s): \"NA\", \"K\", \"CL\", \"CO2\", \"BUN\", \"CREATININE\", \"GLUCOSE\" in the last 72 hours.    Hepatic: No results for input(s): \"AST\", \"ALT\", \"BILITOT\", \"ALKPHOS\" in the last 72 hours.    Invalid input(s): \"ALB\"  Mag:    No results for input(s): \"MG\" in the last 72 hours.   Phos:   No results for input(s): \"PHOS\" in the last 72 hours.   INR: No results for input(s): \"INR\" in the last 72 hours.    Radiology Review:  None    ASSESSMENT AND PLAN:  54 y.o. female status post colostomy closure with colorectal anastomosis  Stable overall  Continue IS, IVF, OOB, PT, OT  Continue periop antibiotics today  New VAC placed yesterday  Change Sunday, OR closure Monday  Possibly adv diet tomorrow       Iam Nolasco MD

## 2025-07-20 PROCEDURE — 2500000003 HC RX 250 WO HCPCS: Performed by: SURGERY

## 2025-07-20 PROCEDURE — 6370000000 HC RX 637 (ALT 250 FOR IP): Performed by: SURGERY

## 2025-07-20 PROCEDURE — 97605 NEG PRS WND THER DME<=50SQCM: CPT | Performed by: SURGERY

## 2025-07-20 PROCEDURE — 1200000000 HC SEMI PRIVATE

## 2025-07-20 PROCEDURE — 6360000002 HC RX W HCPCS: Performed by: SURGERY

## 2025-07-20 RX ADMIN — ENOXAPARIN SODIUM 30 MG: 100 INJECTION SUBCUTANEOUS at 09:42

## 2025-07-20 RX ADMIN — POTASSIUM CHLORIDE: 2 INJECTION, SOLUTION, CONCENTRATE INTRAVENOUS at 06:06

## 2025-07-20 RX ADMIN — MORPHINE SULFATE 4 MG: 4 INJECTION INTRAVENOUS at 09:43

## 2025-07-20 RX ADMIN — MORPHINE SULFATE 2 MG: 2 INJECTION, SOLUTION INTRAMUSCULAR; INTRAVENOUS at 16:39

## 2025-07-20 RX ADMIN — LEVOFLOXACIN 500 MG: 5 INJECTION, SOLUTION INTRAVENOUS at 13:28

## 2025-07-20 RX ADMIN — OXYCODONE 5 MG: 5 TABLET ORAL at 23:19

## 2025-07-20 ASSESSMENT — PAIN SCALES - GENERAL
PAINLEVEL_OUTOF10: 0
PAINLEVEL_OUTOF10: 0
PAINLEVEL_OUTOF10: 5
PAINLEVEL_OUTOF10: 6
PAINLEVEL_OUTOF10: 8
PAINLEVEL_OUTOF10: 3
PAINLEVEL_OUTOF10: 4
PAINLEVEL_OUTOF10: 6

## 2025-07-20 ASSESSMENT — PAIN DESCRIPTION - LOCATION
LOCATION: ABDOMEN

## 2025-07-20 ASSESSMENT — PAIN DESCRIPTION - ORIENTATION
ORIENTATION: MID
ORIENTATION: MID

## 2025-07-20 ASSESSMENT — PAIN DESCRIPTION - DESCRIPTORS
DESCRIPTORS: ACHING
DESCRIPTORS: SORE
DESCRIPTORS: ACHING

## 2025-07-20 NOTE — PROGRESS NOTES
Pt. Assessment complete. Pt. Medicated for pain by prior nurse at shift change. No other complaints voiced. Fall/safety precautions in place. All needs met at this time.

## 2025-07-20 NOTE — PLAN OF CARE
Problem: Discharge Planning  Goal: Discharge to home or other facility with appropriate resources  7/19/2025 2019 by Meredith Rankin, RN  Outcome: Progressing  7/19/2025 0859 by Skyla Britt RN  Outcome: Progressing     Problem: Pain  Goal: Verbalizes/displays adequate comfort level or baseline comfort level  7/19/2025 2019 by Meredith Rankin, RN  Outcome: Progressing  7/19/2025 0859 by Skyla Britt RN  Outcome: Progressing

## 2025-07-20 NOTE — PROGRESS NOTES
[facility]    Surgery Progress Note           POD # 4    PATIENT NAME: Stephani Reyes     TODAY'S DATE: 7/20/2025    SUBJECTIVE:    Pt comfortable, in bed  No N/V, no SOB or CP  Had flatus and BM     OBJECTIVE:   VITALS:  BP (!) 144/90   Pulse 75   Temp 98.4 °F (36.9 °C) (Oral)   Resp 16   Ht 1.499 m (4' 11\")   Wt 50.3 kg (111 lb)   LMP 04/17/2013 (Approximate) Comment: No period in almost 4 years.  SpO2 96%   BMI 22.42 kg/m²     INTAKE/OUTPUT:    No intake/output data recorded.  No intake/output data recorded.              CONSTITUTIONAL:  awake and alert  LUNGS:  clear to auscultation  ABDOMEN:   hypoactive bowel sounds, soft, non-distended, mild tenderness noted around incision   INCISION: VAC in place, good seal present    Data:  CBC:   No results for input(s): \"WBC\", \"HGB\", \"HCT\", \"PLT\" in the last 72 hours.    BMP:    No results for input(s): \"NA\", \"K\", \"CL\", \"CO2\", \"BUN\", \"CREATININE\", \"GLUCOSE\" in the last 72 hours.    Hepatic: No results for input(s): \"AST\", \"ALT\", \"BILITOT\", \"ALKPHOS\" in the last 72 hours.    Invalid input(s): \"ALB\"  Mag:    No results for input(s): \"MG\" in the last 72 hours.   Phos:   No results for input(s): \"PHOS\" in the last 72 hours.   INR: No results for input(s): \"INR\" in the last 72 hours.    Radiology Review:  None    ASSESSMENT AND PLAN:  54 y.o. female status post colostomy closure with colorectal anastomosis  Stable overall  Continue IS, IVF, OOB, PT, OT  Continue periop antibiotics today  New VAC today, 5 x 3 cm and 19 x 1 cm, with bridge, black foam, good seal  OR wound closure Monday  Diet today, NPO after MN       Iam Nolasco MD

## 2025-07-21 ENCOUNTER — ANESTHESIA (OUTPATIENT)
Dept: OPERATING ROOM | Age: 55
DRG: 331 | End: 2025-07-21
Payer: COMMERCIAL

## 2025-07-21 ENCOUNTER — ANESTHESIA EVENT (OUTPATIENT)
Dept: OPERATING ROOM | Age: 55
DRG: 331 | End: 2025-07-21
Payer: COMMERCIAL

## 2025-07-21 PROCEDURE — 6360000002 HC RX W HCPCS: Performed by: ANESTHESIOLOGY

## 2025-07-21 PROCEDURE — 2500000003 HC RX 250 WO HCPCS: Performed by: SURGERY

## 2025-07-21 PROCEDURE — 3700000000 HC ANESTHESIA ATTENDED CARE: Performed by: SURGERY

## 2025-07-21 PROCEDURE — 3600000012 HC SURGERY LEVEL 2 ADDTL 15MIN: Performed by: SURGERY

## 2025-07-21 PROCEDURE — 6360000002 HC RX W HCPCS: Performed by: SURGERY

## 2025-07-21 PROCEDURE — 6360000002 HC RX W HCPCS: Performed by: NURSE ANESTHETIST, CERTIFIED REGISTERED

## 2025-07-21 PROCEDURE — 6370000000 HC RX 637 (ALT 250 FOR IP): Performed by: SURGERY

## 2025-07-21 PROCEDURE — 2709999900 HC NON-CHARGEABLE SUPPLY: Performed by: SURGERY

## 2025-07-21 PROCEDURE — 0WQF0ZZ REPAIR ABDOMINAL WALL, OPEN APPROACH: ICD-10-PCS | Performed by: SURGERY

## 2025-07-21 PROCEDURE — 1200000000 HC SEMI PRIVATE

## 2025-07-21 PROCEDURE — 7100000000 HC PACU RECOVERY - FIRST 15 MIN: Performed by: SURGERY

## 2025-07-21 PROCEDURE — 13160 SEC CLSR SURG WND/DEHSN XTN: CPT | Performed by: SURGERY

## 2025-07-21 PROCEDURE — 2580000003 HC RX 258: Performed by: SURGERY

## 2025-07-21 PROCEDURE — 2500000003 HC RX 250 WO HCPCS: Performed by: NURSE ANESTHETIST, CERTIFIED REGISTERED

## 2025-07-21 PROCEDURE — 3600000002 HC SURGERY LEVEL 2 BASE: Performed by: SURGERY

## 2025-07-21 PROCEDURE — 7100000001 HC PACU RECOVERY - ADDTL 15 MIN: Performed by: SURGERY

## 2025-07-21 PROCEDURE — 3700000001 HC ADD 15 MINUTES (ANESTHESIA): Performed by: SURGERY

## 2025-07-21 RX ORDER — ROCURONIUM BROMIDE 50 MG/5 ML
SYRINGE (ML) INTRAVENOUS
Status: DISCONTINUED | OUTPATIENT
Start: 2025-07-21 | End: 2025-07-21 | Stop reason: SDUPTHER

## 2025-07-21 RX ORDER — SODIUM CHLORIDE 0.9 % (FLUSH) 0.9 %
5-40 SYRINGE (ML) INJECTION EVERY 12 HOURS SCHEDULED
Status: DISCONTINUED | OUTPATIENT
Start: 2025-07-21 | End: 2025-07-21 | Stop reason: HOSPADM

## 2025-07-21 RX ORDER — OXYCODONE HYDROCHLORIDE 5 MG/1
10 TABLET ORAL PRN
Status: DISCONTINUED | OUTPATIENT
Start: 2025-07-21 | End: 2025-07-21 | Stop reason: HOSPADM

## 2025-07-21 RX ORDER — PROPOFOL 10 MG/ML
INJECTION, EMULSION INTRAVENOUS
Status: DISCONTINUED | OUTPATIENT
Start: 2025-07-21 | End: 2025-07-21 | Stop reason: SDUPTHER

## 2025-07-21 RX ORDER — SODIUM CHLORIDE 0.9 % (FLUSH) 0.9 %
5-40 SYRINGE (ML) INJECTION PRN
Status: DISCONTINUED | OUTPATIENT
Start: 2025-07-21 | End: 2025-07-21 | Stop reason: HOSPADM

## 2025-07-21 RX ORDER — LIDOCAINE HYDROCHLORIDE 20 MG/ML
INJECTION, SOLUTION INFILTRATION; PERINEURAL
Status: DISCONTINUED | OUTPATIENT
Start: 2025-07-21 | End: 2025-07-21 | Stop reason: SDUPTHER

## 2025-07-21 RX ORDER — LABETALOL HYDROCHLORIDE 5 MG/ML
10 INJECTION, SOLUTION INTRAVENOUS
Status: DISCONTINUED | OUTPATIENT
Start: 2025-07-21 | End: 2025-07-21 | Stop reason: HOSPADM

## 2025-07-21 RX ORDER — PROCHLORPERAZINE EDISYLATE 5 MG/ML
5 INJECTION INTRAMUSCULAR; INTRAVENOUS
Status: DISCONTINUED | OUTPATIENT
Start: 2025-07-21 | End: 2025-07-21 | Stop reason: HOSPADM

## 2025-07-21 RX ORDER — ONDANSETRON 2 MG/ML
INJECTION INTRAMUSCULAR; INTRAVENOUS
Status: DISCONTINUED | OUTPATIENT
Start: 2025-07-21 | End: 2025-07-21 | Stop reason: SDUPTHER

## 2025-07-21 RX ORDER — OXYCODONE HYDROCHLORIDE 5 MG/1
5 TABLET ORAL PRN
Status: DISCONTINUED | OUTPATIENT
Start: 2025-07-21 | End: 2025-07-21 | Stop reason: HOSPADM

## 2025-07-21 RX ORDER — BUPIVACAINE HYDROCHLORIDE 5 MG/ML
INJECTION, SOLUTION EPIDURAL; INTRACAUDAL; PERINEURAL
Status: DISCONTINUED | OUTPATIENT
Start: 2025-07-21 | End: 2025-07-21 | Stop reason: HOSPADM

## 2025-07-21 RX ORDER — MIDAZOLAM HYDROCHLORIDE 1 MG/ML
INJECTION, SOLUTION INTRAMUSCULAR; INTRAVENOUS
Status: DISCONTINUED | OUTPATIENT
Start: 2025-07-21 | End: 2025-07-21 | Stop reason: SDUPTHER

## 2025-07-21 RX ORDER — SODIUM CHLORIDE 9 MG/ML
INJECTION, SOLUTION INTRAVENOUS PRN
Status: DISCONTINUED | OUTPATIENT
Start: 2025-07-21 | End: 2025-07-21 | Stop reason: HOSPADM

## 2025-07-21 RX ORDER — FENTANYL CITRATE 50 UG/ML
INJECTION, SOLUTION INTRAMUSCULAR; INTRAVENOUS
Status: DISCONTINUED | OUTPATIENT
Start: 2025-07-21 | End: 2025-07-21 | Stop reason: SDUPTHER

## 2025-07-21 RX ORDER — FENTANYL CITRATE 50 UG/ML
25 INJECTION, SOLUTION INTRAMUSCULAR; INTRAVENOUS EVERY 5 MIN PRN
Status: DISCONTINUED | OUTPATIENT
Start: 2025-07-21 | End: 2025-07-21 | Stop reason: HOSPADM

## 2025-07-21 RX ORDER — HYDROMORPHONE HYDROCHLORIDE 2 MG/ML
INJECTION, SOLUTION INTRAMUSCULAR; INTRAVENOUS; SUBCUTANEOUS
Status: DISCONTINUED | OUTPATIENT
Start: 2025-07-21 | End: 2025-07-21 | Stop reason: SDUPTHER

## 2025-07-21 RX ORDER — DEXAMETHASONE SODIUM PHOSPHATE 4 MG/ML
INJECTION, SOLUTION INTRA-ARTICULAR; INTRALESIONAL; INTRAMUSCULAR; INTRAVENOUS; SOFT TISSUE
Status: DISCONTINUED | OUTPATIENT
Start: 2025-07-21 | End: 2025-07-21 | Stop reason: SDUPTHER

## 2025-07-21 RX ORDER — ONDANSETRON 2 MG/ML
4 INJECTION INTRAMUSCULAR; INTRAVENOUS
Status: DISCONTINUED | OUTPATIENT
Start: 2025-07-21 | End: 2025-07-21 | Stop reason: HOSPADM

## 2025-07-21 RX ORDER — HYDROMORPHONE HYDROCHLORIDE 2 MG/ML
0.5 INJECTION, SOLUTION INTRAMUSCULAR; INTRAVENOUS; SUBCUTANEOUS EVERY 5 MIN PRN
Status: DISCONTINUED | OUTPATIENT
Start: 2025-07-21 | End: 2025-07-21 | Stop reason: HOSPADM

## 2025-07-21 RX ORDER — HYDRALAZINE HYDROCHLORIDE 20 MG/ML
10 INJECTION INTRAMUSCULAR; INTRAVENOUS
Status: DISCONTINUED | OUTPATIENT
Start: 2025-07-21 | End: 2025-07-21 | Stop reason: HOSPADM

## 2025-07-21 RX ORDER — HYDROMORPHONE HYDROCHLORIDE 2 MG/ML
INJECTION, SOLUTION INTRAMUSCULAR; INTRAVENOUS; SUBCUTANEOUS
Status: COMPLETED
Start: 2025-07-21 | End: 2025-07-21

## 2025-07-21 RX ADMIN — ONDANSETRON 4 MG: 2 INJECTION, SOLUTION INTRAMUSCULAR; INTRAVENOUS at 07:42

## 2025-07-21 RX ADMIN — FENTANYL CITRATE 25 MCG: 50 INJECTION INTRAMUSCULAR; INTRAVENOUS at 08:30

## 2025-07-21 RX ADMIN — POTASSIUM CHLORIDE: 2 INJECTION, SOLUTION, CONCENTRATE INTRAVENOUS at 14:28

## 2025-07-21 RX ADMIN — FENTANYL CITRATE 25 MCG: 50 INJECTION INTRAMUSCULAR; INTRAVENOUS at 08:35

## 2025-07-21 RX ADMIN — SODIUM CHLORIDE: 9 INJECTION, SOLUTION INTRAVENOUS at 07:00

## 2025-07-21 RX ADMIN — MELATONIN TAB 3 MG 6 MG: 3 TAB at 22:29

## 2025-07-21 RX ADMIN — FENTANYL CITRATE 50 MCG: 50 INJECTION, SOLUTION INTRAMUSCULAR; INTRAVENOUS at 07:52

## 2025-07-21 RX ADMIN — MORPHINE SULFATE 2 MG: 2 INJECTION, SOLUTION INTRAMUSCULAR; INTRAVENOUS at 13:01

## 2025-07-21 RX ADMIN — LIDOCAINE HYDROCHLORIDE 50 MG: 20 INJECTION, SOLUTION INFILTRATION; PERINEURAL at 07:35

## 2025-07-21 RX ADMIN — OXYCODONE 5 MG: 5 TABLET ORAL at 22:28

## 2025-07-21 RX ADMIN — Medication 40 MG: at 07:35

## 2025-07-21 RX ADMIN — MIDAZOLAM 2 MG: 1 INJECTION INTRAMUSCULAR; INTRAVENOUS at 07:30

## 2025-07-21 RX ADMIN — DEXAMETHASONE SODIUM PHOSPHATE 8 MG: 4 INJECTION, SOLUTION INTRAMUSCULAR; INTRAVENOUS at 07:42

## 2025-07-21 RX ADMIN — SUGAMMADEX 200 MG: 100 INJECTION, SOLUTION INTRAVENOUS at 08:01

## 2025-07-21 RX ADMIN — PROPOFOL 150 MG: 10 INJECTION, EMULSION INTRAVENOUS at 07:35

## 2025-07-21 RX ADMIN — HYDROMORPHONE HYDROCHLORIDE 1 MG: 2 INJECTION, SOLUTION INTRAMUSCULAR; INTRAVENOUS; SUBCUTANEOUS at 08:15

## 2025-07-21 RX ADMIN — FENTANYL CITRATE 50 MCG: 50 INJECTION, SOLUTION INTRAMUSCULAR; INTRAVENOUS at 07:44

## 2025-07-21 ASSESSMENT — PAIN DESCRIPTION - ORIENTATION
ORIENTATION: MID
ORIENTATION: MID
ORIENTATION: MID;LOWER

## 2025-07-21 ASSESSMENT — PAIN SCALES - GENERAL
PAINLEVEL_OUTOF10: 2
PAINLEVEL_OUTOF10: 3
PAINLEVEL_OUTOF10: 7
PAINLEVEL_OUTOF10: 7
PAINLEVEL_OUTOF10: 3
PAINLEVEL_OUTOF10: 5
PAINLEVEL_OUTOF10: 5
PAINLEVEL_OUTOF10: 7

## 2025-07-21 ASSESSMENT — PAIN DESCRIPTION - LOCATION
LOCATION: ABDOMEN

## 2025-07-21 ASSESSMENT — LIFESTYLE VARIABLES: SMOKING_STATUS: 0

## 2025-07-21 ASSESSMENT — PAIN DESCRIPTION - DESCRIPTORS
DESCRIPTORS: ACHING
DESCRIPTORS: ACHING

## 2025-07-21 ASSESSMENT — PAIN - FUNCTIONAL ASSESSMENT
PAIN_FUNCTIONAL_ASSESSMENT: ACTIVITIES ARE NOT PREVENTED
PAIN_FUNCTIONAL_ASSESSMENT: 0-10

## 2025-07-21 ASSESSMENT — PAIN DESCRIPTION - PAIN TYPE: TYPE: SURGICAL PAIN

## 2025-07-21 NOTE — OP NOTE
[address] [address]                            OPERATIVE REPORT      PATIENT NAME: TESSIE LEWIS            : 1970  MED REC NO: [id number]                      ROOM: ASC OR  ACCOUNT NO: [id number]                       ADMIT DATE: 2025  PROVIDER: Iam Brice MD      DATE OF PROCEDURE:  2025    SURGEON:  Iam Brice MD    PREOPERATIVE DIAGNOSES:    1. Open abdominal wound.  2. Status post closure of colostomy.    POSTOPERATIVE DIAGNOSES:    1. Open abdominal wound.  2. Status post closure of colostomy.    PROCEDURE:  Secondary abdominal wound closure.    ANESTHESIA:  General plus local.    ESTIMATED BLOOD LOSS:  Minimal.    COMPLICATIONS:  None.    DETAIL OF SURGERY:  The patient was brought to the operating room, placed on the operating table in supine position.  Compression boots were placed.  General anesthetic was administered.  The abdomen was prepped and draped sterilely.  Time-out was done.  Scrub was then done of the abdominal wound after removing the wound VAC and the fascia was intact.  Subcutaneous tissue was rendered hemostatic with Bovie electrocautery.  Closure of the wound and the colostomy site was then performed.  Staples were used for the skin closure.  All surgical sites appeared dry.  A BRIGIDA wound dressing was then cut to fit over the midline wound and the old colostomy wound, which had been closed with a good suction and seal noted.  The patient was taken to recovery room in stable condition postop.        IAM BRICE MD    D:  2025 08:08:53     T:  2025 08:25:27     KRISTEN/SHAGGY  Job #:  [id number]     Doc#:  [id number]

## 2025-07-21 NOTE — ANESTHESIA POSTPROCEDURE EVALUATION
Department of Anesthesiology  Postprocedure Note    Patient: Stephani Reyes  MRN: [id number]  YOB: 1970  Date of evaluation: 7/21/2025    Procedure Summary       Date: 07/21/25 Room / Location: [address]    Anesthesia Start: 0730 Anesthesia Stop: 0817    Procedure: SECONDARY WOUND CLOSURE ABDOMEN Diagnosis:       Open wound of anterior abdominal wall, initial encounter      (Open wound of anterior abdominal wall, initial encounter [S31.109A])    Surgeons: Iam Nolasco MD Responsible Provider: Desiree Graf DO    Anesthesia Type: general ASA Status: 2            Anesthesia Type: No value filed.    Katarina Phase I: Katarina Score: 10    Katarina Phase II:      Anesthesia Post Evaluation    Patient location during evaluation: PACU  Patient participation: complete - patient participated  Level of consciousness: awake and alert  Pain score: 1  Airway patency: patent  Nausea & Vomiting: no nausea and no vomiting  Cardiovascular status: blood pressure returned to baseline  Respiratory status: acceptable  Hydration status: euvolemic  Multimodal analgesia pain management approach    No notable events documented.

## 2025-07-21 NOTE — PROGRESS NOTES
Pt admitted to pre op via wheelchair from in pt room. Assessment complete, pre op teaching performed, questions encouraged and answered.

## 2025-07-21 NOTE — PLAN OF CARE
Problem: Discharge Planning  Goal: Discharge to home or other facility with appropriate resources  7/21/2025 0933 by Laurita Ochoa, OMAR  Outcome: Progressing  7/21/2025 0058 by Danette Montgomery, RN  Outcome: Progressing     Problem: Pain  Goal: Verbalizes/displays adequate comfort level or baseline comfort level  7/21/2025 0933 by Laurita Ochoa RN  Outcome: Progressing  7/21/2025 0058 by Danette Montgomery, RN  Outcome: Progressing

## 2025-07-21 NOTE — PROGRESS NOTES
2030: Assessment complete, VSS, BS active, wound vac dressing occlusive, pt rating abd pain 3/10, pt declines pain med or ice pack at this time, assisted to the BR and back to bed, pt passing flatus, voiding w/o difficulty, discussed care plan, pt mutually agrees, no other needs expressed at this time.  Danette Montgomery RN

## 2025-07-21 NOTE — H&P
[facility] GENERAL AND LAPAROSCOPIC SURGERY                       PATIENT NAME: Stephani Reyes     ADMISSION DATE: 7/16/2025  5:51 AM      TODAY'S DATE: 7/21/2025      HISTORY OF PRESENT ILLNESS:              The patient is a 54 y.o. female who presents with a colostomy in place.    Past Medical History:        Diagnosis Date    Calculus of gallbladder without cholecystitis without obstruction 10/02/2023    Chronic chest pain 01/24/2024    Due to chronic pleural disease.  Was referred to pain clinic.    Diverticulosis of colon 05/05/2023    Essential hypertension 02/25/2021    Hyperlipidemia     Ovarian cancer (HCC) 06/17/2024    Endometrioid ovarian cancer stage IIa    Pelvic mass 09/04/2024    Perforated sigmoid colon (HCC) 10/31/2024    Positive QuantiFERON-TB Gold test 09/13/2023    Septicemia ([facility]) 10/22/2024    Uterine leiomyoma 04/27/2022       Past Surgical History:        Procedure Laterality Date    ABDOMEN SURGERY N/A 11/6/2024    SECONDARY ABDOMINAL WALL WOUND CLOSURE performed by Iam Nolasco MD at [facility] OR    ABDOMEN SURGERY N/A 7/21/2025    SECONDARY WOUND CLOSURE ABDOMEN performed by Iam Nolasco MD at [facility] OR    CHOLECYSTECTOMY, LAPAROSCOPIC N/A 06/17/2024    LAPAROSCOPIC CHOLECYSTECTOMY WITH CHOLANGIOGRAM performed by Iam Nolasco MD at [facility] OR    COLONOSCOPY  05/05/2023    With polypectomy performed by Dr. Ren Yee    HYSTERECTOMY, TOTAL ABDOMINAL (CERVIX REMOVED)  07/22/2024    DIAGNOSTIC LAPAROSCOPY, ROBOT TOTAL HYSTERECTOMY, BILATERAL SALPINGO-OOPHORECTOMY, HAND ASSISTED EXTRACTION by Dr. Courtney Ogden    LAPAROTOMY N/A 10/31/2024    EXPLORATORY LAPAROTOMY performed by Iam Nolasco MD at [facility] OR    LUNG SURGERY  08/21/2023    s/p Robotic evacuation of pleural effusion, pleural biopsy x2 ,partial pleurectomy , mechanical pleurodesis,talc pleurodesis    PORT SURGERY Left 09/16/2024    POWER PORT-A-CATHETER PLACEMENT WITH

## 2025-07-21 NOTE — PROGRESS NOTES
Nutrition Note    RECOMMENDATIONS  PO Diet: Continue current diet  ONS: Ordered Ensure Plus HP BID (chocolate per past RD notes)  Nursing: RD ordered wt. Please obtain actual wt of the pt and document wt method.     ASSESSMENT   Pt triggered for positive nutrition screen. S/p colostomy closure with colorectal anastomosis 7/16. Off unit upon visiting for secondary wound closure of abdomen today. Only stated wt of 115 lb documented for current admission, unable to assess for recent wt change at this time. NPO/clear liquid diet x 4 days until advanced to regular yesterday with two documented meal intakes of 26-50%. RD will order ONS to offer additional nutrition and monitor for adequate po intake.     Malnutrition Status: Insufficient data    NUTRITION DIAGNOSIS   Inadequate oral intake related to inadequate protein-energy intake as evidenced by intake 0-25%    Goals: PO intake 50% or greater, by next RD assessment     NUTRITION RELATED FINDINGS  Objective: D5% and .45%NS at 40 mL/hr. LBM 7/20.  Wounds: Wound Vac, Surgical Incision    CURRENT NUTRITION THERAPIES  ADULT DIET; Regular       PO Intake: 26-50%   PO Supplement Intake:None Ordered    ANTHROPOMETRICS  Current Height: 149.9 cm (4' 11\")  Current Weight - Scale: 52.2 kg (115 lb)      COMPARATIVE STANDARDS  Total Energy Requirements (kcals/day): unable to calculate estimated energy needs until actual wt of the pt is obtained       EDUCATION  Education/Counseling not indicated     The patient will be monitored per nutrition standards of care. Consult dietitian if additional nutrition interventions are needed prior to RD reassessment.     Sakina Becerril, MS, RD, LD    Contact: [phone]

## 2025-07-21 NOTE — BRIEF OP NOTE
Brief Postoperative Note      Patient: Stephani Reyes  YOB: 1970  MRN: [id number]    Date of Procedure: 7/21/2025    Pre-Op Diagnosis Codes:      * Open wound of anterior abdominal wall, initial encounter [S31.109A]    Post-Op Diagnosis: Same       Procedure(s):  SECONDARY WOUND CLOSURE ABDOMEN    Surgeon(s):  Iam Nolasco MD    Assistant:  Surgical Assistant: Luna Thomas    Anesthesia: General    Estimated Blood Loss (mL): Minimal    Complications: None    Specimens:   * No specimens in log *    Implants:  * No implants in log *      Drains:   Negative Pressure Wound Therapy Abdomen Medial (Active)   Dressing Status Intact w/seal 07/21/25 0640   Canister changed? No 07/20/25 2030   Target Pressure (mmHg) 75 07/17/25 0907   Number of pieces placed 2 07/16/25 1254   Dressing Type Black foam 07/21/25 0640       [REMOVED] Colostomy LLQ (Removed)       [REMOVED] Urinary Catheter 07/16/25 Liriano (Removed)   Catheter Indications Perioperative use for selected surgical procedures 07/17/25 1406   Site Assessment No urethral drainage 07/17/25 1406   Urine Color Yellow 07/17/25 1406   Urine Appearance Clear 07/17/25 1406   Collection Container Standard 07/17/25 1406   Securement Method Securing device (Describe) 07/17/25 1406   Catheter Care  Perineal wipes 07/17/25 0907   Catheter Best Practices  Drainage tube clipped to bed;Catheter secured to thigh;Tamper seal intact;Bag below bladder;Bag not on floor;Lack of dependent loop in tubing;Drainage bag less than half full 07/17/25 1406   Status Draining 07/17/25 1406   Output (mL) 650 mL 07/17/25 1406       Findings:  Present At Time Of Surgery (PATOS) (choose all levels that have infection present):  No infection present  Other Findings: Wound closed, BRIGIDA placed    Electronically signed by Iam Nolasco MD on 7/21/2025 at 8:05 AM

## 2025-07-21 NOTE — ANESTHESIA PRE PROCEDURE
Department of Anesthesiology  Preprocedure Note       Name:  Stephani Reyes   Age:  54 y.o.  :  1970                                          MRN:  [id number]         Date:  2025      Surgeon: Surgeon(s):  Iam Nolasco MD    Procedure: Procedure(s):  SECONDARY WOUND CLOSURE ABDOMEN    Medications prior to admission:   Prior to Admission medications    Medication Sig Start Date End Date Taking? Authorizing Provider   ferrous sulfate (IRON 325) 325 (65 Fe) MG tablet Take 1 tablet by mouth in the morning and 1 tablet in the evening.  Patient not taking: Reported on 2025 11/10/24   Bessy Monroe MD       Current medications:    Current Facility-Administered Medications   Medication Dose Route Frequency Provider Last Rate Last Admin    dextrose 5 % and 0.45 % NaCl with KCl 20 mEq infusion   IntraVENous Continuous Iam Nolasco MD 40 mL/hr at 25 0606 New Bag at 25 0606    sodium chloride flush 0.9 % injection 5-40 mL  5-40 mL IntraVENous 2 times per day Iam Nolasco MD   10 mL at 25 1010    sodium chloride flush 0.9 % injection 5-40 mL  5-40 mL IntraVENous PRN Iam Nolasco MD        0.9 % sodium chloride infusion   IntraVENous PRN Iam Nolasco MD        oxyCODONE (ROXICODONE) immediate release tablet 5 mg  5 mg Oral Q4H PRN Iam Nolasco MD   5 mg at 25 2319    morphine (PF) injection 2 mg  2 mg IntraVENous Q2H PRN Iam Nolasco MD   2 mg at 25 1639    Or    morphine injection 4 mg  4 mg IntraVENous Q2H PRN Iam Nolasco MD   4 mg at 25 0943    ondansetron (ZOFRAN) injection 4 mg  4 mg IntraVENous Q4H PRN Iam Nolasco MD        enoxaparin Sodium (LOVENOX) injection 30 mg  30 mg SubCUTAneous Daily Iam Nolasco MD   30 mg at 25 0942       Allergies:    Allergies   Allergen Reactions    Crab Extract Itching    Shellfish Allergy Itching    Shrimp

## 2025-07-21 NOTE — PROGRESS NOTES
Pt states that her pain is \"much better\" and rates it as a 1-2 on 0-10 scale. Pt's  to her bedside.

## 2025-07-21 NOTE — PROGRESS NOTES
Patient transferred to the room from PACU. A&Ox4, /101, other VSS on RA. Will continue to monitor.     1200: Assessment completed, VSS on RA, pt A&Ox4 up in bed. BRIGIDA dressing to abd CDI, denies any pain at this time. Scheduled med given per MAR, POC and education reviewed with the patient. Patient calls out appropriately. All needs met at this time, call light in reach, will continue to monitor.

## 2025-07-21 NOTE — PROGRESS NOTES
Pt admitted to PACU via stretcher from the OR, report received, assessment complete. Pt c/o pain, medicated with dilaudid per CRNA

## 2025-07-21 NOTE — PLAN OF CARE
Problem: Discharge Planning  Goal: Discharge to home or other facility with appropriate resources  7/21/2025 0058 by Danette Montgomery, RN  Outcome: Progressing     Problem: Pain  Goal: Verbalizes/displays adequate comfort level or baseline comfort level  7/21/2025 0058 by Danette Montgomery, RN  Outcome: Progressing

## 2025-07-22 VITALS
HEART RATE: 80 BPM | WEIGHT: 115 LBS | BODY MASS INDEX: 23.18 KG/M2 | DIASTOLIC BLOOD PRESSURE: 80 MMHG | OXYGEN SATURATION: 98 % | HEIGHT: 59 IN | RESPIRATION RATE: 16 BRPM | TEMPERATURE: 98 F | SYSTOLIC BLOOD PRESSURE: 120 MMHG

## 2025-07-22 PROCEDURE — 99024 POSTOP FOLLOW-UP VISIT: CPT | Performed by: SURGERY

## 2025-07-22 PROCEDURE — 6370000000 HC RX 637 (ALT 250 FOR IP): Performed by: SURGERY

## 2025-07-22 PROCEDURE — 6360000002 HC RX W HCPCS: Performed by: SURGERY

## 2025-07-22 PROCEDURE — 2500000003 HC RX 250 WO HCPCS: Performed by: SURGERY

## 2025-07-22 PROCEDURE — APPSS30 APP SPLIT SHARED TIME 16-30 MINUTES: Performed by: NURSE PRACTITIONER

## 2025-07-22 PROCEDURE — APPNB30 APP NON BILLABLE TIME 0-30 MINS: Performed by: NURSE PRACTITIONER

## 2025-07-22 RX ORDER — OXYCODONE AND ACETAMINOPHEN 5; 325 MG/1; MG/1
1 TABLET ORAL EVERY 6 HOURS PRN
Qty: 10 TABLET | Refills: 0 | Status: SHIPPED | OUTPATIENT
Start: 2025-07-22 | End: 2025-07-29

## 2025-07-22 RX ADMIN — MORPHINE SULFATE 2 MG: 2 INJECTION, SOLUTION INTRAMUSCULAR; INTRAVENOUS at 09:11

## 2025-07-22 RX ADMIN — Medication 10 ML: at 09:11

## 2025-07-22 RX ADMIN — OXYCODONE 5 MG: 5 TABLET ORAL at 06:21

## 2025-07-22 RX ADMIN — ENOXAPARIN SODIUM 30 MG: 100 INJECTION SUBCUTANEOUS at 09:11

## 2025-07-22 ASSESSMENT — PAIN SCALES - GENERAL
PAINLEVEL_OUTOF10: 3
PAINLEVEL_OUTOF10: 4
PAINLEVEL_OUTOF10: 5
PAINLEVEL_OUTOF10: 7

## 2025-07-22 ASSESSMENT — PAIN DESCRIPTION - LOCATION
LOCATION: ABDOMEN
LOCATION: ABDOMEN

## 2025-07-22 ASSESSMENT — PAIN DESCRIPTION - DESCRIPTORS
DESCRIPTORS: ACHING
DESCRIPTORS: SORE

## 2025-07-22 ASSESSMENT — PAIN DESCRIPTION - PAIN TYPE: TYPE: SURGICAL PAIN

## 2025-07-22 ASSESSMENT — PAIN DESCRIPTION - ORIENTATION: ORIENTATION: MID

## 2025-07-22 NOTE — PLAN OF CARE
Problem: Discharge Planning  Goal: Discharge to home or other facility with appropriate resources  7/22/2025 1306 by Laurita Ochoa RN  Outcome: Completed  7/22/2025 0915 by Laurita Ochoa RN  Outcome: Progressing     Problem: Pain  Goal: Verbalizes/displays adequate comfort level or baseline comfort level  7/22/2025 1306 by Laurita Ochoa RN  Outcome: Completed  7/22/2025 0915 by Laurita Ochoa RN  Outcome: Progressing     Problem: Nutrition Deficit:  Goal: Optimize nutritional status  7/22/2025 1306 by Laurita Ochoa RN  Outcome: Completed  7/22/2025 0915 by Laurita Ochoa RN  Outcome: Progressing     Problem: Safety - Adult  Goal: Free from fall injury  7/22/2025 1306 by Laurita Ochoa RN  Outcome: Completed  7/22/2025 0915 by Laurita Ochoa RN  Outcome: Progressing

## 2025-07-22 NOTE — PROGRESS NOTES
Shift assessment completed, SS on RA, pt A&Ox4 up in bed. Dressing to abd CDI, c/o pain to abd 7/10. Patient tolerating diet well, voiding, BM x1 yesterday. Scheduled and PRN med given per MAR, POC and education reviewed with the patient. Patient calls out appropriately. All needs met at this time, call light in reach, will continue to monitor.     1307: Discharge instructions given, all questions answered. Patient discharged home via w/c,  at side.

## 2025-07-22 NOTE — PROGRESS NOTES
Occupational Therapy  OT present for treatment this morning. Pt reported she is up ad jef in the room and walking the halls. Pt reported she has been completing all of her own ADLs in the room and she has no concerns at this time. Due to independence OT will sign off on as pt reports no further therapy needs. Discharge from caseload.     Nan Holt, OTR/L

## 2025-07-22 NOTE — PROGRESS NOTES
Physical Therapy    Pt evaluated on 7/18 with no further PT recommended at discharge and no DME needs.  Pt is now up ad jef in her room and walking the halls during the day.  Pt denies any further therapy needs.  Will discharge from our caseload.     Thanks, Cuca Cavanaugh PT, DPT [id number]

## 2025-07-22 NOTE — DISCHARGE SUMMARY
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    Prediabetes    Ovarian cancer (HCC)    Endometrial cancer (HCC)    Intra-abdominal abscess (HCC)    On antineoplastic chemotherapy    Port-A-Cath in place    Elevated CA-125    Carcinoembryonic antigen (CEA) elevation    Immunocompromised    Moderate malnutrition    Open abdominal wall wound    Colostomy in place (HCC)    Intestinal adhesions      Consultants: None    Procedures/Diagnostic Test(s):  No orders to display       Discharge Condition: good    HOSPITAL COURSE: Stephani originally presented to the hospital on 7/16/2025  5:51 AM with a history of colostomy in place, status-post Kevin's procedure for perforated colon and intraabdominal abscess for elective exploratory laparotomy, extensive lysis of adhesions, colostomy takedown with colorectal anastomosis, flexible sigmoidoscopy, abdominal subcutaneous wound VAC placement. She returned to the operating room on 7/21/2025 and underwent secondary abdominal wound closure. Hospital course was uneventful.    Surgical Pathology:  OR 7/16/2025--FINAL DIAGNOSIS:   Old colostomy, anastomotic rings, biopsy:      - Portion of colon and skin with changes compatible with colostomy.     At time of discharge, Stephani was tolerating a regular diet, had active bowel sounds, ambulating on her own accord and had adequate analgesia on oral pain medications, and had no signs of symptoms of complications.    PHYSICAL EXAMINATION:  Discharge Vitals:  height is 1.499 m (4' 11\") and weight is

## 2025-07-22 NOTE — OP NOTE
[place] [address]                            OPERATIVE REPORT      PATIENT NAME: TESSIE LEWIS            : 1970  MED REC NO: [id number]                      ROOM: [address] [place] [address]  ACCOUNT NO: [id number]                       ADMIT DATE: 2025  PROVIDER: Iam Nolasco MD      DATE OF PROCEDURE:  2025    SURGEON:  Iam Nolasco MD    PREOPERATIVE DIAGNOSES:  Colostomy in place, status post Kevin's procedure for perforated colon and intraabdominal abscess.    POSTOPERATIVE DIAGNOSES:  Colostomy in place, status post Kevin's procedure for perforated colon and intraabdominal abscess.    PROCEDURES:  Exploratory laparotomy, extensive lysis of adhesions, colostomy takedown with colorectal anastomosis, flexible sigmoidoscopy, abdominal subcutaneous wound VAC placement.    ANESTHESIA:  General plus local.    ESTIMATED BLOOD LOSS:  Minimal.    COMPLICATIONS:  None.    SPECIMENS:  Old colostomy sent to pathology.    FINDINGS:  The patient had extensive adhesions present intra-abdominally from her prior surgery and infection.  This required an extra hour and a half of surgical dissection time in order to free up the bowel, expose the pelvis, and perform the colostomy closure.  This was ultimately completed with the endoluminal 25 mm ECS stapler.  Abdominal wound VAC was placed for the midline incision and the old colostomy site for plans for dressing changes and prospectively planned return to the OR for closure if all healing well.    DESCRIPTION OF PROCEDURE:  The patient was brought to the operating room, placed on operating table in supine position.  Compression boots were placed.  General endotracheal anesthesia administered.  Lithotomy position was established.  The abdomen and perineal area was all prepped and draped sterilely.  Liriano catheter was placed.  Old colostomy area was sutured closed

## 2025-07-22 NOTE — DISCHARGE INSTRUCTIONS
[facility]    Discharge Instructions      Activity  - activity as tolerated, no driving while on analgesics, and no heavy lifting for 6 weeks; it is OK to be up walking around--walking up and down stairs is also OK. Do what is comfortable: stop and rest if you feel tired.    Diet  - regular diet  - Drink plenty of fluids     Pain  - You may use narcotic pain medication as prescribed for breakthrough pain  - You can use OTC Tylenol or Ibuprofen for 1-2 weeks (may need to adjust the dose as directed on the bottle if enteric coated ibuprofen chosen)  - Avoid taking narcotic pain medication on an empty stomach which may result in nausea, if pain medication is causing nausea try decreasing the dose  - Narcotic pain medication is not necessary, please contact the office if pain is not controlled  - Narcotic pain medication will not be refilled on weekends and after hours  - Please contact the office Monday-Friday 8a-4p if a refill is requested    Nausea  - Avoid taking narcotic pain medication on an empty stomach which may result in nausea  - If pain medication is causing nausea you may try decreasing the dose  - Nausea can be common for 24 hours after surgery--if nausea uncontrolled or worsening, contact the office or go to the ED    Constipation  - Pain medication can be constipating  - Often, it helps to take a stool softener with the goal of at least one soft bowel movement daily with minimal straining  - You may also need to increase water and fiber intake--may supplement with Benefiber and increasing your activity will also be helpful  - If a stool softener is needed, the following OTC medications are recommend: Colace 100 mg by mouth twice daily, Miralax 17 gm by mouth 1-3 times daily with glass of water, dulcolax suppositories, and Fleets enemas    Wound Care  - keep wound clean and dry; keep BRIGIDA dressing in place, call the office if you have any questions regarding BRIGIDA

## 2025-07-22 NOTE — PROGRESS NOTES
2219: Assessment complete, VSS, BS active, BRIGIDA dressing CDI, pt reports pain 5/10 after getting in and out of bed, pt tolerated regular diet, + flatus and BM, pt ambulated several times in the hallway during the day, discussed care plan, pt mutually agrees, call light and belongings in reach, no other needs at this time.    0621: gave oxy for abd pain, see MAR.  Danette Montgomery RN

## 2025-07-22 NOTE — CARE COORDINATION
Case Management -  Discharge Note      Patient Name: Stephani Reyes                   YOB: 1970  Room: [address] [place], [place] [address]-            Readmission Risk (Low < 19, Mod (19-27), High > 27): Readmission Risk Score: 5    Current PCP: Reyes, Eleia J, MD      (Formerly [facility]) Important Message from Medicare:    Has pt received appropriate compliance notices before being discharged if required: N/A  Compliance doc:  [] 2nd IMM; [] Code 44 [] [place]  Date Given:  Given By:     PT AM-PAC: 18 /24  OT AM-PAC: 21 /24    Per PT note today: Pt evaluated on 7/18 with no further PT recommended at discharge and no DME needs. Pt is now up ad jef in her room and walking the halls during the day. Pt denies any further therapy needs. Will discharge from our caseload.        [facility] agency notified of discharge:  [] Yes [] No  [x] NA    Family notified of discharge:  [] Yes  [] No  [x] NA  Patient to notify  Facility notified of discharge:  [] Yes  [] No  [x] NA    Pt is being discharged with Outpt IV Antibiotics  [] Yes [] No  [x] NA  If yes, make sure BISI is faxed to [facility] agency, and meds are called in to pharmacy by RN from BISI orders only.      Financial    Payor: [employer] / Plan: [employer] QUALIFIED HEALTH PLAN [facility] [place] / Product Type: *No Product type* /     Pharmacy:  Potential assistance Purchasing Medications: No  Meds-to-Beds request: Yes      [facility] [id number] - [address] -  [phone] - [phone]  [address] [place] [address]  Phone: [phone] Fax: [phone]      Notes:    Additional Case Management Notes: Patient discharged 7/22/2025 to home  All discharge needs met per case management     Kenia Walker RN, BSN  [phone]

## 2025-07-23 ENCOUNTER — TELEPHONE (OUTPATIENT)
Dept: PRIMARY CARE CLINIC | Age: 55
End: 2025-07-23

## 2025-07-23 NOTE — TELEPHONE ENCOUNTER
Care Transitions Initial Follow Up Call    Outreach made within 2 business days of discharge: Yes    Patient: Stephani Reyes Patient : 1970   MRN: [id number]  Reason for Admission: Surgery   Discharge Date: 25       Spoke with: patient     Discharge department/facility: [facility] Interactive Patient Contact:  Was patient able to fill all prescriptions: Yes  Was patient instructed to bring all medications to the follow-up visit: Yes  Is patient taking all medications as directed in the discharge summary? Yes  Does patient understand their discharge instructions: Yes  Does patient have questions or concerns that need addressed prior to 7-14 day follow up office visit: no    Additional needs identified to be addressed with provider  No needs identified            Patient wants to call back to schedule when feeling better, states she just got out of hospital after surgery.     Scheduled appointment with PCP within 7-14 days    Follow Up  No future appointments.    Patsy Cordero MA

## 2025-07-29 ENCOUNTER — OFFICE VISIT (OUTPATIENT)
Dept: SURGERY | Age: 55
End: 2025-07-29

## 2025-07-29 VITALS — WEIGHT: 115 LBS | SYSTOLIC BLOOD PRESSURE: 109 MMHG | BODY MASS INDEX: 23.23 KG/M2 | DIASTOLIC BLOOD PRESSURE: 72 MMHG

## 2025-07-29 DIAGNOSIS — Z93.3 COLOSTOMY IN PLACE (HCC): Primary | ICD-10-CM

## 2025-07-29 PROCEDURE — 99024 POSTOP FOLLOW-UP VISIT: CPT | Performed by: SURGERY

## 2025-07-29 NOTE — PROGRESS NOTES
[facility] GENERAL AND LAPAROSCOPIC SURGERY          PATIENT NAME: Stephani Reyes     TODAY'S DATE: 7/29/2025    SUBJECTIVE:    Pt returns post op  Incision sealed with BRIGIDA  Pain ok  Eating and having BM  No fever.     OBJECTIVE:  VITALS:  Wt 52.2 kg (115 lb)   LMP 04/17/2013 (Approximate) Comment: No period in almost 4 years.  BMI 23.23 kg/m²     CONSTITUTIONAL:  awake and alert  LUNGS:  clear to auscultation  ABDOMEN:  normal bowel sounds, soft, non-distended, non-tender, incision closed, BRIGIDA removed, no cellulitis or drainage     Data:      Radiology Review:  None      ASSESSMENT AND PLAN:  S/P Colostomy closure with colorectal anastomosis  Doing well  POD 8 from wound closure  Will ask pt to return next Tuesday and see Dr. Lindsey to remove mike  Then see in a month    Iam Nolasco MD

## 2025-08-05 ENCOUNTER — OFFICE VISIT (OUTPATIENT)
Dept: SURGERY | Age: 55
End: 2025-08-05

## 2025-08-05 VITALS — WEIGHT: 112 LBS | SYSTOLIC BLOOD PRESSURE: 118 MMHG | DIASTOLIC BLOOD PRESSURE: 68 MMHG | BODY MASS INDEX: 22.62 KG/M2

## 2025-08-05 DIAGNOSIS — Z09 SURGERY FOLLOW-UP: Primary | ICD-10-CM

## 2025-08-05 PROCEDURE — 99024 POSTOP FOLLOW-UP VISIT: CPT | Performed by: SURGERY

## 2025-09-04 ENCOUNTER — OFFICE VISIT (OUTPATIENT)
Dept: SURGERY | Age: 55
End: 2025-09-04

## 2025-09-04 VITALS — SYSTOLIC BLOOD PRESSURE: 127 MMHG | WEIGHT: 116 LBS | DIASTOLIC BLOOD PRESSURE: 82 MMHG | BODY MASS INDEX: 23.43 KG/M2

## 2025-09-04 DIAGNOSIS — Z93.3 COLOSTOMY IN PLACE (HCC): Primary | ICD-10-CM

## 2025-09-04 PROCEDURE — 99024 POSTOP FOLLOW-UP VISIT: CPT | Performed by: SURGERY

## (undated) DEVICE — DEVICE SEAL L23CM NANO COAT MARYLAND JAW OPN DIV LIGASURE

## (undated) DEVICE — ULTRACLEAN ACCESSORY ELECTRODE, 6 INCH COATED BLADE WITH EXTENDED INSULATION: Brand: ULTRACLEAN

## (undated) DEVICE — SUTURE PDS + SZ 1 L96IN ABSRB VLT L65MM TP-1 1/2 CIR PDP880G

## (undated) DEVICE — HYPODERMIC SAFETY NEEDLE: Brand: MAGELLAN

## (undated) DEVICE — 1LYRTR 16FR10ML 100%SILI SNAP: Brand: MEDLINE INDUSTRIES, INC.

## (undated) DEVICE — 3M™ IOBAN™ 2 ANTIMICROBIAL INCISE DRAPE 6650EZ: Brand: IOBAN™ 2

## (undated) DEVICE — GOWN SIRUS NONREIN LG W/TWL: Brand: MEDLINE INDUSTRIES, INC.

## (undated) DEVICE — WET SKIN PREP TRAY: Brand: MEDLINE INDUSTRIES, INC.

## (undated) DEVICE — MAJOR SET UP: Brand: MEDLINE INDUSTRIES, INC.

## (undated) DEVICE — BLANKET WRM W29.9XL79.1IN UP BODY FORC AIR MISTRAL-AIR

## (undated) DEVICE — LIQUIBAND RAPID ADHESIVE 36/CS 0.8ML: Brand: MEDLINE

## (undated) DEVICE — DRAIN ROUND 15FR PERFORATED SURG L49IN DIA3/16IN 10IN H SIL W/O TRCR END PERF

## (undated) DEVICE — MASC MINOR: Brand: MEDLINE INDUSTRIES, INC.

## (undated) DEVICE — PICO 7 10CM X 30CM: Brand: PICO™ 7

## (undated) DEVICE — LIGHT HANDLE: Brand: DEVON

## (undated) DEVICE — LAP CHOLE: Brand: MEDLINE INDUSTRIES, INC.

## (undated) DEVICE — SUTURE PERMAHAND SZ 3-0 L18IN NONABSORBABLE BLK L26MM SH C013D

## (undated) DEVICE — BOWL MED L 32OZ PLAS W/ MOLD GRAD EZ OPN PEEL PCH

## (undated) DEVICE — SCRUB DRY SURG EZ SCRUB BRUSH PREOPERATIVE GRN

## (undated) DEVICE — PICO 7 10CM X 40CM: Brand: PICO™ 7

## (undated) DEVICE — STAPLER SKIN H3.9MM WIRE DIA0.58MM CRWN 6.9MM 35 STPL ROT

## (undated) DEVICE — BLADE ES ELASTOMERIC COAT INSUL DURABLE BEND UPTO 90DEG

## (undated) DEVICE — SUTURE MONOCRYL + SZ 4-0 L27IN ABSRB UD L19MM PS-2 3/8 CIR MCP426H

## (undated) DEVICE — LAPAROSCOPIC TROCAR SLEEVE/SINGLE USE: Brand: KII® LOW PROFILE OPTICAL ACCESS SYSTEM

## (undated) DEVICE — ELECTRODE PT RET AD L9FT HI MOIST COND ADH HYDRGEL CORDED

## (undated) DEVICE — C-ARM: Brand: UNBRANDED

## (undated) DEVICE — INTENDED FOR TISSUE SEPARATION, AND OTHER PROCEDURES THAT REQUIRE A SHARP SURGICAL BLADE TO PUNCTURE OR CUT.: Brand: BARD-PARKER ® STAINLESS STEEL BLADES

## (undated) DEVICE — LEGGINGS, PAIR, 31X48, STERILE: Brand: MEDLINE

## (undated) DEVICE — SUTURE ABSORBABLE MONOFILAMENT 0 CTX 60 IN VIO PDS + PDP990G

## (undated) DEVICE — PENCIL SMK EVAC L10FT TBNG NONSTICK ESU BLDE PLUMEPEN ELITE

## (undated) DEVICE — COLLECTOR SPEC RAYON LIQ STUART DBL FOR THRT VAG SKIN WND

## (undated) DEVICE — SWAB CULT SGL AMIES W/O CHAR FOR THRT VAG SKIN HRT CULTSWAB

## (undated) DEVICE — BLANKET WARMING L 2010 X W 760 MM UPPER BODY 2 HOSE INLET

## (undated) DEVICE — LOOP LIG SUT SZ 0 L18IN ABSRB POLYDIOXANONE MFIL PDS II

## (undated) DEVICE — NEEDLE,22GX1.5",REG,BEVEL: Brand: MEDLINE

## (undated) DEVICE — DRAPE,T,LAPARO,TRANS,STERILE: Brand: MEDLINE

## (undated) DEVICE — RELOAD STPL H4.1X2MM DIA60MM THCK TISS GRN 6 ROW PWR GST B

## (undated) DEVICE — SHEET,DRAPE,53X77,STERILE: Brand: MEDLINE

## (undated) DEVICE — TOWEL,OR,DSP,ST,BLUE,DLX,10/PK,8PK/CS: Brand: MEDLINE

## (undated) DEVICE — STAPLER ECHELON 3000 60MM COMPACT

## (undated) DEVICE — PAD ABSRB W8XL10IN ABD HYDROPHOBIC NONWOVEN THCK LAYR CELOS

## (undated) DEVICE — APPLICATOR MEDICATED 26 CC SOLUTION HI LT ORNG CHLORAPREP

## (undated) DEVICE — SUTURE SPIRAL 2-0 SH STRATAFIX

## (undated) DEVICE — SHEET, T, LAPAROTOMY, STERILE: Brand: MEDLINE

## (undated) DEVICE — STAPLER INT DIA25MM CLS STPL H1.5-2.2MM OPN LEG L5.2MM 22

## (undated) DEVICE — SYRINGE MED 10ML TRNSLUC BRL PLUNG BLK MRK POLYPR CTRL

## (undated) DEVICE — SYRINGE, LUER LOCK, 30ML: Brand: MEDLINE

## (undated) DEVICE — GAUZE,SPONGE,4"X4",8PLY,STRL,LF,10/TRAY: Brand: MEDLINE

## (undated) DEVICE — TROCAR: Brand: KII FIOS FIRST ENTRY

## (undated) DEVICE — PAD,NON-ADHERENT,3X8,STERILE,LF,1/PK: Brand: MEDLINE

## (undated) DEVICE — SUTURE ETHILON SZ 3-0 L18IN NONABSORBABLE BLK PS-2 L19MM 3/8 1669H

## (undated) DEVICE — MAJOR SETUP PACK: Brand: CARDINAL HEALTH

## (undated) DEVICE — PENCIL SMK EVAC TELSCP 3 M TBNG

## (undated) DEVICE — MARQUEE® DISPOSABLE CORE BIOPSY INSTRUMENT, 18G X 20CM: Brand: MARQUEE

## (undated) DEVICE — COVER LT HNDL BLU PLAS

## (undated) DEVICE — TROCAR SLEEVE: Brand: KII ® LOW PROFILE SLEEVE

## (undated) DEVICE — KIT EVAC 100CC W10MMXL20CM SIL FULL PERF HUBLESS FLAT DRN

## (undated) DEVICE — SPONGE,LAP,18"X18",STD,XR,ST,5/PK,40PK/C: Brand: MEDLINE

## (undated) DEVICE — GLOVE SURG SZ 6.5 L11.2IN FNGR THK9.8MIL STRW LTX POLYMER

## (undated) DEVICE — INDICATED FOR USE DURING OPEN AND LAPAROSCOPIC CHOLECYSTECTOMY PROCEDURES TO INJECT RADIOPAQUE MEDIA THROUGH THE CYSTIC DUCT INTO THE BILIARY TREE.: Brand: AEROSTAT®

## (undated) DEVICE — STAPLER EXT 65MM S STL AUTO DISP PURSTRING

## (undated) DEVICE — DRAPE THER FLUID WARMING 66X44 IN FLAT SLUSH DBL DISC ORS

## (undated) DEVICE — PAD,ABDOMINAL,8"X10",ST,LF: Brand: MEDLINE

## (undated) DEVICE — APPLIER CLP M/L SHFT DIA5MM 15 LIG LIGAMAX 5

## (undated) DEVICE — SYRINGE MEDICAL 3ML CLEAR PLASTIC STANDARD NON CONTROL LUERLOCK TIP DISPOSABLE

## (undated) DEVICE — SUTURE PROL SZ 1 L30IN NONABSORBABLE BLU L36MM CT-1 1/2 CIR 8425H

## (undated) DEVICE — SPECIMEN TRAP: Brand: ARGYLE

## (undated) DEVICE — 3 ML SYRINGE LUER-LOCK TIP: Brand: MONOJECT

## (undated) DEVICE — SUTURE VICRYL + SZ 3-0 L27IN ABSRB UD L26MM SH 1/2 CIR VCP416H

## (undated) DEVICE — SUTURE VICRYL + SZ 3-0 L18IN ABSRB UD SH 1/2 CIR TAPERCUT NDL VCP864D

## (undated) DEVICE — SEALER LAP L37CM MARYLAND JAW OPN NANO COAT MULTIFUNCTIONAL

## (undated) DEVICE — BANDAGE,GAUZE,BULKEE II,4.5"X4.1YD,STRL: Brand: MEDLINE

## (undated) DEVICE — BASIC SINGLE BASIN 1-LF: Brand: MEDLINE INDUSTRIES, INC.

## (undated) DEVICE — SUTURE VICRYL + SZ 0 L27IN ABSRB VLT L26MM UR-6 5/8 CIR VCP603H

## (undated) DEVICE — SUTURE PERMAHAND SZ 2-0 L18IN NONABSORBABLE BLK L26MM SH C012D

## (undated) DEVICE — BW-412T DISP COMBO CLEANING BRUSH: Brand: SINGLE USE COMBINATION CLEANING BRUSH

## (undated) DEVICE — MASC TURNOVER KIT: Brand: MEDLINE INDUSTRIES, INC.

## (undated) DEVICE — SUTURE VICRYL + SZ 0 L18IN ABSRB UD L36MM CT-1 1/2 CIR VCP840D

## (undated) DEVICE — Device

## (undated) DEVICE — Device: Brand: PRIME MEDICAL LLC

## (undated) DEVICE — SYRINGE MED 10ML LUERLOCK TIP W/O SFTY DISP

## (undated) DEVICE — SYRINGE, LUER LOCK, 10ML: Brand: MEDLINE

## (undated) DEVICE — SYRINGE,10ML,TR/FR,MLL,W/RES: Brand: NAMIC

## (undated) DEVICE — BAG RETRIEVAL SPECIMEN SUPERBAG 10 MEDIUM NYLON ITRODUCER

## (undated) DEVICE — CANISTER WND THER 500 ML NEG PRESSURE GEL TBNG STRL DISP

## (undated) DEVICE — SOLUTION IRRIG 500ML 0.9% SOD CHLO USP POUR PLAS BTL

## (undated) DEVICE — SUTURE PERMAHAND SZ 2-0 L30IN NONABSORBABLE BLK SILK W/O A305H

## (undated) DEVICE — SUTURE PERMA-HAND 0 L18IN NONABSORBABLE BLK SILK BRAID W/O SA66G

## (undated) DEVICE — DECANTER FLD 9IN ST BG FOR ASEP TRNSF OF FLD

## (undated) DEVICE — Device: Brand: SENSURA MIO

## (undated) DEVICE — TOWEL,OR,DSP,ST,BLUE,STD,4/PK,20PK/CS: Brand: MEDLINE

## (undated) DEVICE — SOLUTION IRRIG 500ML STRL H2O NONPYROGENIC

## (undated) DEVICE — SUTURE ETHIBOND EXCEL SZ 0 L18IN NONABSORBABLE GRN L36MM CT-1 CX21D

## (undated) DEVICE — SOLUTION IRRIG 1000ML 09% SOD CHL USP PIC PLAS CONTAINER

## (undated) DEVICE — SYRINGE 60ML BULB IRRIG ST LF

## (undated) DEVICE — SUTURE PERMAHAND SZ 3-0 L30IN NONABSORBABLE BLK SILK BRAID A304H

## (undated) DEVICE — 3M™ TEGADERM™ TRANSPARENT FILM DRESSING FRAME STYLE, 1626, 4 IN X 4-3/4 IN (10 CM X 12 CM), 50/CT 4CT/CASE: Brand: 3M™ TEGADERM™

## (undated) DEVICE — SOLUTION IRRIG 1000ML 0.9% SOD CHL USP POUR PLAS BTL

## (undated) DEVICE — SOLUTION IRRIG 1000ML H2O PIC PLAS SHATTERPROOF CONTAINER

## (undated) DEVICE — SYRINGE MED 50ML LUERLOCK TIP

## (undated) DEVICE — PROCEDURE KIT ENDOSCP CUST

## (undated) DEVICE — ESWAB LQ AMIES  REG. NYLON FLOCKED  APPLICATOR: Brand: ESWAB™

## (undated) DEVICE — SPONGE LAP W18XL18IN WHT COT 4 PLY FLD STRUNG RADPQ DISP ST 2 PER PACK